# Patient Record
Sex: FEMALE | Race: BLACK OR AFRICAN AMERICAN | Employment: OTHER | URBAN - METROPOLITAN AREA
[De-identification: names, ages, dates, MRNs, and addresses within clinical notes are randomized per-mention and may not be internally consistent; named-entity substitution may affect disease eponyms.]

---

## 2020-07-09 ENCOUNTER — HOSPITAL ENCOUNTER (OUTPATIENT)
Dept: CT IMAGING | Age: 75
Discharge: HOME | End: 2020-07-09
Attending: ORTHOPAEDIC SURGERY
Payer: MEDICARE

## 2020-07-09 DIAGNOSIS — M25.561 RIGHT KNEE PAIN: ICD-10-CM

## 2020-07-09 PROCEDURE — 73700 CT LOWER EXTREMITY W/O DYE: CPT

## 2020-08-05 ENCOUNTER — HOSPITAL ENCOUNTER (OUTPATIENT)
Dept: PREADMISSION TESTING | Age: 75
Discharge: HOME | End: 2020-08-05
Payer: MEDICARE

## 2020-08-05 LAB
ALBUMIN SERPL-MCNC: 3.4 G/DL (ref 3.4–5)
ALBUMIN/GLOB SERPL: 1 {RATIO} (ref 0.8–1.7)
ALP SERPL-CCNC: 72 U/L (ref 45–117)
ALT SERPL-CCNC: 14 U/L (ref 13–56)
ANION GAP SERPL CALC-SCNC: 4 MMOL/L (ref 3–18)
APPEARANCE UR: CLEAR
AST SERPL-CCNC: 14 U/L (ref 10–38)
BACTERIA URNS QL MICRO: ABNORMAL /HPF
BILIRUB SERPL-MCNC: 0.8 MG/DL (ref 0.2–1)
BILIRUB UR QL: NEGATIVE
BUN SERPL-MCNC: 14 MG/DL (ref 7–18)
BUN/CREAT SERPL: 14 (ref 12–20)
CALCIUM SERPL-MCNC: 9.2 MG/DL (ref 8.5–10.1)
CHLORIDE SERPL-SCNC: 105 MMOL/L (ref 100–111)
CO2 SERPL-SCNC: 33 MMOL/L (ref 21–32)
COLOR UR: YELLOW
CREAT SERPL-MCNC: 0.99 MG/DL (ref 0.6–1.3)
EPITH CASTS URNS QL MICRO: ABNORMAL /LPF (ref 0–5)
ERYTHROCYTE [DISTWIDTH] IN BLOOD BY AUTOMATED COUNT: 14.7 % (ref 11.6–14.5)
EST. AVERAGE GLUCOSE BLD GHB EST-MCNC: 140 MG/DL
GLOBULIN SER CALC-MCNC: 3.4 G/DL (ref 2–4)
GLUCOSE SERPL-MCNC: 109 MG/DL (ref 74–99)
GLUCOSE UR STRIP.AUTO-MCNC: NEGATIVE MG/DL
HBA1C MFR BLD: 6.5 % (ref 4.2–5.6)
HCT VFR BLD AUTO: 38.1 % (ref 35–45)
HGB BLD-MCNC: 12 G/DL (ref 12–16)
HGB UR QL STRIP: NEGATIVE
KETONES UR QL STRIP.AUTO: NEGATIVE MG/DL
LEUKOCYTE ESTERASE UR QL STRIP.AUTO: ABNORMAL
MCH RBC QN AUTO: 30.4 PG (ref 24–34)
MCHC RBC AUTO-ENTMCNC: 31.5 G/DL (ref 31–37)
MCV RBC AUTO: 96.5 FL (ref 74–97)
MUCOUS THREADS URNS QL MICRO: POSITIVE /LPF
NITRITE UR QL STRIP.AUTO: NEGATIVE
PH UR STRIP: 6 [PH] (ref 5–8)
PLATELET # BLD AUTO: 232 K/UL (ref 135–420)
PMV BLD AUTO: 11.1 FL (ref 9.2–11.8)
POTASSIUM SERPL-SCNC: 4.1 MMOL/L (ref 3.5–5.5)
PROT SERPL-MCNC: 6.8 G/DL (ref 6.4–8.2)
PROT UR STRIP-MCNC: NEGATIVE MG/DL
RBC # BLD AUTO: 3.95 M/UL (ref 4.2–5.3)
RBC #/AREA URNS HPF: ABNORMAL /HPF (ref 0–5)
SODIUM SERPL-SCNC: 142 MMOL/L (ref 136–145)
SP GR UR REFRACTOMETRY: 1.02 (ref 1–1.03)
UROBILINOGEN UR QL STRIP.AUTO: 1 EU/DL (ref 0.2–1)
WBC # BLD AUTO: 5.4 K/UL (ref 4.6–13.2)
WBC URNS QL MICRO: ABNORMAL /HPF (ref 0–5)

## 2020-08-05 PROCEDURE — 80053 COMPREHEN METABOLIC PANEL: CPT

## 2020-08-05 PROCEDURE — 93005 ELECTROCARDIOGRAM TRACING: CPT

## 2020-08-05 PROCEDURE — 87086 URINE CULTURE/COLONY COUNT: CPT

## 2020-08-05 PROCEDURE — 85027 COMPLETE CBC AUTOMATED: CPT

## 2020-08-05 PROCEDURE — 36415 COLL VENOUS BLD VENIPUNCTURE: CPT

## 2020-08-05 PROCEDURE — 83036 HEMOGLOBIN GLYCOSYLATED A1C: CPT

## 2020-08-05 PROCEDURE — 81001 URINALYSIS AUTO W/SCOPE: CPT

## 2020-08-06 LAB
ATRIAL RATE: 82 BPM
BACTERIA SPEC CULT: NORMAL
CALCULATED P AXIS, ECG09: 62 DEGREES
CALCULATED R AXIS, ECG10: 54 DEGREES
CALCULATED T AXIS, ECG11: 32 DEGREES
DIAGNOSIS, 93000: NORMAL
P-R INTERVAL, ECG05: 138 MS
Q-T INTERVAL, ECG07: 364 MS
QRS DURATION, ECG06: 90 MS
QTC CALCULATION (BEZET), ECG08: 425 MS
SERVICE CMNT-IMP: NORMAL
SERVICE CMNT-IMP: NORMAL
VENTRICULAR RATE, ECG03: 82 BPM

## 2020-08-17 ENCOUNTER — TELEPHONE (OUTPATIENT)
Dept: OTHER | Age: 75
End: 2020-08-17

## 2020-08-17 NOTE — TELEPHONE ENCOUNTER
Spoke with Estrella Starkey about their total knee replacement. Educated patient about getting ready for surgery, what to expect after surgery during hospital stay, and how to get ready now for discharge. Discussion included:  1) Importance of good nutrition before and after surgery. 2) Preventing nausea by eating before taking pain medications. 3) Getting Medical Equipment before coming to the hospital.  4) Reading the education book before surgery. 5) Wearing comfortable clothes that are easy to put on and take off.  6) Buying a stool softener and taking one everyday after surgery to prevent constipation. 7) Getting home ready for after surgery. 8) Home health after surgery. 9) Drinking lots of fluids to make sure urine is light yellow. 10) Moving after surgery. 11) What to expect the day of surgery. Informed patient that increased swelling, bruising and pain are normal at home after knee replacement. Instructed Estrella Starkey it is safe to elevate whole leg on pillows above heart level to help decrease swelling after surgery. Instructed not to put anything directly under the knee so that they are working on straightening the leg when resting. Patient was given the opportunity to ask questions. [facility] Navigators phone number given to patient for any questions that they need answered before or after surgery.      Orthopaedic Navigator

## 2020-08-18 ENCOUNTER — HOSPITAL ENCOUNTER (OUTPATIENT)
Dept: PREADMISSION TESTING | Age: 75
Discharge: HOME | End: 2020-08-18
Payer: MEDICARE

## 2020-08-18 PROCEDURE — 87635 SARS-COV-2 COVID-19 AMP PRB: CPT

## 2020-08-19 LAB — SARS-COV-2, COV2NT: NOT DETECTED

## 2020-08-23 ENCOUNTER — ANESTHESIA EVENT (OUTPATIENT)
Dept: SURGERY | Age: 75
End: 2020-08-23
Payer: MEDICARE

## 2020-08-24 ENCOUNTER — ANESTHESIA (OUTPATIENT)
Dept: SURGERY | Age: 75
End: 2020-08-24
Payer: MEDICARE

## 2020-08-24 ENCOUNTER — HOSPITAL ENCOUNTER (OUTPATIENT)
Age: 75
Discharge: HOME HEALTH | End: 2020-08-25
Attending: ORTHOPAEDIC SURGERY | Admitting: ORTHOPAEDIC SURGERY
Payer: MEDICARE

## 2020-08-24 DIAGNOSIS — M17.0 PRIMARY OSTEOARTHRITIS OF BOTH KNEES: Primary | ICD-10-CM

## 2020-08-24 PROBLEM — M17.9 DJD (DEGENERATIVE JOINT DISEASE) OF KNEE: Status: ACTIVE | Noted: 2020-08-24

## 2020-08-24 LAB
ABO + RH BLD: NORMAL
BLOOD GROUP ANTIBODIES SERPL: NORMAL
GLUCOSE BLD STRIP.AUTO-MCNC: 111 MG/DL (ref 70–110)
GLUCOSE BLD STRIP.AUTO-MCNC: 111 MG/DL (ref 70–110)
GLUCOSE BLD STRIP.AUTO-MCNC: 116 MG/DL (ref 70–110)
GLUCOSE BLD STRIP.AUTO-MCNC: 151 MG/DL (ref 70–110)
SPECIMEN EXP DATE BLD: NORMAL

## 2020-08-24 PROCEDURE — 74011250636 HC RX REV CODE- 250/636: Performed by: ANESTHESIOLOGY

## 2020-08-24 PROCEDURE — C1713 ANCHOR/SCREW BN/BN,TIS/BN: HCPCS | Performed by: ORTHOPAEDIC SURGERY

## 2020-08-24 PROCEDURE — 82962 GLUCOSE BLOOD TEST: CPT

## 2020-08-24 PROCEDURE — 77030011628: Performed by: ORTHOPAEDIC SURGERY

## 2020-08-24 PROCEDURE — 77030016060 HC NDL NRV BLK TELE -A: Performed by: NURSE ANESTHETIST, CERTIFIED REGISTERED

## 2020-08-24 PROCEDURE — 76010000149 HC OR TIME 1 TO 1.5 HR: Performed by: ORTHOPAEDIC SURGERY

## 2020-08-24 PROCEDURE — 74011250637 HC RX REV CODE- 250/637: Performed by: ANESTHESIOLOGY

## 2020-08-24 PROCEDURE — 86900 BLOOD TYPING SEROLOGIC ABO: CPT

## 2020-08-24 PROCEDURE — 74011000250 HC RX REV CODE- 250: Performed by: NURSE ANESTHETIST, CERTIFIED REGISTERED

## 2020-08-24 PROCEDURE — 77030020782 HC GWN BAIR PAWS FLX 3M -B: Performed by: ORTHOPAEDIC SURGERY

## 2020-08-24 PROCEDURE — 97116 GAIT TRAINING THERAPY: CPT

## 2020-08-24 PROCEDURE — 99218 HC RM OBSERVATION: CPT

## 2020-08-24 PROCEDURE — 77030013708 HC HNDPC SUC IRR PULS STRY –B: Performed by: ORTHOPAEDIC SURGERY

## 2020-08-24 PROCEDURE — C1776 JOINT DEVICE (IMPLANTABLE): HCPCS | Performed by: ORTHOPAEDIC SURGERY

## 2020-08-24 PROCEDURE — 74011250636 HC RX REV CODE- 250/636: Performed by: ORTHOPAEDIC SURGERY

## 2020-08-24 PROCEDURE — 74011000258 HC RX REV CODE- 258: Performed by: ORTHOPAEDIC SURGERY

## 2020-08-24 PROCEDURE — 77030020813 HC INST SCULP CEM KT DISP S&N -B: Performed by: ORTHOPAEDIC SURGERY

## 2020-08-24 PROCEDURE — 77030003028 HC SUT VCRL J&J -A: Performed by: ORTHOPAEDIC SURGERY

## 2020-08-24 PROCEDURE — 77030016661 HC BUR RND1 STRY -C: Performed by: ORTHOPAEDIC SURGERY

## 2020-08-24 PROCEDURE — 77030040361 HC SLV COMPR DVT MDII -B: Performed by: ORTHOPAEDIC SURGERY

## 2020-08-24 PROCEDURE — 77030027138 HC INCENT SPIROMETER -A

## 2020-08-24 PROCEDURE — 77030022295 HC SEAL BPLR VSL DISP MEDT -F: Performed by: ORTHOPAEDIC SURGERY

## 2020-08-24 PROCEDURE — 76210000006 HC OR PH I REC 0.5 TO 1 HR: Performed by: ORTHOPAEDIC SURGERY

## 2020-08-24 PROCEDURE — 74011250637 HC RX REV CODE- 250/637: Performed by: ORTHOPAEDIC SURGERY

## 2020-08-24 PROCEDURE — 77030002966 HC SUT PDS J&J -A: Performed by: ORTHOPAEDIC SURGERY

## 2020-08-24 PROCEDURE — 77030006812 HC BLD SAW RECIP STRY -B: Performed by: ORTHOPAEDIC SURGERY

## 2020-08-24 PROCEDURE — 64447 NJX AA&/STRD FEMORAL NRV IMG: CPT | Performed by: NURSE ANESTHETIST, CERTIFIED REGISTERED

## 2020-08-24 PROCEDURE — 77030000032 HC CUF TRNQT ZIMM -B: Performed by: ORTHOPAEDIC SURGERY

## 2020-08-24 PROCEDURE — 76060000033 HC ANESTHESIA 1 TO 1.5 HR: Performed by: ORTHOPAEDIC SURGERY

## 2020-08-24 PROCEDURE — 74011000250 HC RX REV CODE- 250: Performed by: ORTHOPAEDIC SURGERY

## 2020-08-24 PROCEDURE — 97161 PT EVAL LOW COMPLEX 20 MIN: CPT

## 2020-08-24 PROCEDURE — 36415 COLL VENOUS BLD VENIPUNCTURE: CPT

## 2020-08-24 PROCEDURE — 77030037714 HC CLOSR DEV INCIS ZIP STRY -C: Performed by: ORTHOPAEDIC SURGERY

## 2020-08-24 PROCEDURE — 74011250636 HC RX REV CODE- 250/636: Performed by: NURSE ANESTHETIST, CERTIFIED REGISTERED

## 2020-08-24 PROCEDURE — 77030012508 HC MSK AIRWY LMA AMBU -A: Performed by: ANESTHESIOLOGY

## 2020-08-24 PROCEDURE — 76942 ECHO GUIDE FOR BIOPSY: CPT | Performed by: ORTHOPAEDIC SURGERY

## 2020-08-24 PROCEDURE — 77030038010: Performed by: ORTHOPAEDIC SURGERY

## 2020-08-24 DEVICE — CEMENT BNE 20GM HALF DOSE PMMA VISC RADPQ FAST: Type: IMPLANTABLE DEVICE | Site: KNEE | Status: FUNCTIONAL

## 2020-08-24 DEVICE — IMPLANTABLE DEVICE: Type: IMPLANTABLE DEVICE | Site: KNEE | Status: FUNCTIONAL

## 2020-08-24 RX ORDER — FLUMAZENIL 0.1 MG/ML
0.2 INJECTION INTRAVENOUS
Status: DISCONTINUED | OUTPATIENT
Start: 2020-08-24 | End: 2020-08-24 | Stop reason: HOSPADM

## 2020-08-24 RX ORDER — AMOXICILLIN 250 MG
1 CAPSULE ORAL 2 TIMES DAILY
Status: DISCONTINUED | OUTPATIENT
Start: 2020-08-24 | End: 2020-08-25 | Stop reason: HOSPADM

## 2020-08-24 RX ORDER — SODIUM CHLORIDE, SODIUM LACTATE, POTASSIUM CHLORIDE, CALCIUM CHLORIDE 600; 310; 30; 20 MG/100ML; MG/100ML; MG/100ML; MG/100ML
1000 INJECTION, SOLUTION INTRAVENOUS CONTINUOUS
Status: DISCONTINUED | OUTPATIENT
Start: 2020-08-24 | End: 2020-08-24 | Stop reason: HOSPADM

## 2020-08-24 RX ORDER — FENTANYL CITRATE 50 UG/ML
INJECTION, SOLUTION INTRAMUSCULAR; INTRAVENOUS AS NEEDED
Status: DISCONTINUED | OUTPATIENT
Start: 2020-08-24 | End: 2020-08-24 | Stop reason: HOSPADM

## 2020-08-24 RX ORDER — SODIUM CHLORIDE 0.9 % (FLUSH) 0.9 %
5-40 SYRINGE (ML) INJECTION AS NEEDED
Status: DISCONTINUED | OUTPATIENT
Start: 2020-08-24 | End: 2020-08-25 | Stop reason: HOSPADM

## 2020-08-24 RX ORDER — PANTOPRAZOLE SODIUM 40 MG/1
40 TABLET, DELAYED RELEASE ORAL DAILY
Status: CANCELLED | OUTPATIENT
Start: 2020-08-24

## 2020-08-24 RX ORDER — HYDROMORPHONE HYDROCHLORIDE 1 MG/ML
1 INJECTION, SOLUTION INTRAMUSCULAR; INTRAVENOUS; SUBCUTANEOUS
Status: DISCONTINUED | OUTPATIENT
Start: 2020-08-24 | End: 2020-08-25 | Stop reason: HOSPADM

## 2020-08-24 RX ORDER — FACIAL-BODY WIPES
10 EACH TOPICAL DAILY PRN
Status: DISCONTINUED | OUTPATIENT
Start: 2020-08-24 | End: 2020-08-25 | Stop reason: HOSPADM

## 2020-08-24 RX ORDER — ONDANSETRON 2 MG/ML
INJECTION INTRAMUSCULAR; INTRAVENOUS AS NEEDED
Status: DISCONTINUED | OUTPATIENT
Start: 2020-08-24 | End: 2020-08-24 | Stop reason: HOSPADM

## 2020-08-24 RX ORDER — INSULIN LISPRO 100 [IU]/ML
INJECTION, SOLUTION INTRAVENOUS; SUBCUTANEOUS ONCE
Status: DISCONTINUED | OUTPATIENT
Start: 2020-08-24 | End: 2020-08-24 | Stop reason: HOSPADM

## 2020-08-24 RX ORDER — KETAMINE HYDROCHLORIDE 10 MG/ML
INJECTION, SOLUTION INTRAMUSCULAR; INTRAVENOUS AS NEEDED
Status: DISCONTINUED | OUTPATIENT
Start: 2020-08-24 | End: 2020-08-24 | Stop reason: HOSPADM

## 2020-08-24 RX ORDER — SODIUM CHLORIDE 0.9 % (FLUSH) 0.9 %
5-40 SYRINGE (ML) INJECTION EVERY 8 HOURS
Status: DISCONTINUED | OUTPATIENT
Start: 2020-08-24 | End: 2020-08-25 | Stop reason: HOSPADM

## 2020-08-24 RX ORDER — SODIUM CHLORIDE 0.9 % (FLUSH) 0.9 %
5-40 SYRINGE (ML) INJECTION EVERY 8 HOURS
Status: DISCONTINUED | OUTPATIENT
Start: 2020-08-24 | End: 2020-08-24 | Stop reason: HOSPADM

## 2020-08-24 RX ORDER — CELECOXIB 100 MG/1
200 CAPSULE ORAL 2 TIMES DAILY
Status: DISCONTINUED | OUTPATIENT
Start: 2020-08-24 | End: 2020-08-25 | Stop reason: HOSPADM

## 2020-08-24 RX ORDER — NALOXONE HYDROCHLORIDE 0.4 MG/ML
0.1 INJECTION, SOLUTION INTRAMUSCULAR; INTRAVENOUS; SUBCUTANEOUS AS NEEDED
Status: DISCONTINUED | OUTPATIENT
Start: 2020-08-24 | End: 2020-08-24 | Stop reason: HOSPADM

## 2020-08-24 RX ORDER — INSULIN LISPRO 100 [IU]/ML
INJECTION, SOLUTION INTRAVENOUS; SUBCUTANEOUS
Status: DISCONTINUED | OUTPATIENT
Start: 2020-08-24 | End: 2020-08-25 | Stop reason: HOSPADM

## 2020-08-24 RX ORDER — GLYCOPYRROLATE 0.2 MG/ML
INJECTION INTRAMUSCULAR; INTRAVENOUS AS NEEDED
Status: DISCONTINUED | OUTPATIENT
Start: 2020-08-24 | End: 2020-08-24 | Stop reason: HOSPADM

## 2020-08-24 RX ORDER — HYDROMORPHONE HYDROCHLORIDE 2 MG/ML
0.5 INJECTION, SOLUTION INTRAMUSCULAR; INTRAVENOUS; SUBCUTANEOUS
Status: DISCONTINUED | OUTPATIENT
Start: 2020-08-24 | End: 2020-08-24 | Stop reason: HOSPADM

## 2020-08-24 RX ORDER — ACETAMINOPHEN 325 MG/1
650 TABLET ORAL EVERY 6 HOURS
Status: DISCONTINUED | OUTPATIENT
Start: 2020-08-24 | End: 2020-08-25 | Stop reason: HOSPADM

## 2020-08-24 RX ORDER — ROPIVACAINE HYDROCHLORIDE 5 MG/ML
INJECTION, SOLUTION EPIDURAL; INFILTRATION; PERINEURAL
Status: COMPLETED | OUTPATIENT
Start: 2020-08-24 | End: 2020-08-24

## 2020-08-24 RX ORDER — ENOXAPARIN SODIUM 100 MG/ML
40 INJECTION SUBCUTANEOUS DAILY
Status: DISCONTINUED | OUTPATIENT
Start: 2020-08-25 | End: 2020-08-25 | Stop reason: HOSPADM

## 2020-08-24 RX ORDER — CEFAZOLIN SODIUM 2 G/50ML
2 SOLUTION INTRAVENOUS EVERY 8 HOURS
Status: COMPLETED | OUTPATIENT
Start: 2020-08-24 | End: 2020-08-25

## 2020-08-24 RX ORDER — OXYCODONE HYDROCHLORIDE 5 MG/1
10 TABLET ORAL
Status: DISCONTINUED | OUTPATIENT
Start: 2020-08-24 | End: 2020-08-25 | Stop reason: HOSPADM

## 2020-08-24 RX ORDER — DEXTROSE, SODIUM CHLORIDE, SODIUM LACTATE, POTASSIUM CHLORIDE, AND CALCIUM CHLORIDE 5; .6; .31; .03; .02 G/100ML; G/100ML; G/100ML; G/100ML; G/100ML
50 INJECTION, SOLUTION INTRAVENOUS CONTINUOUS
Status: DISCONTINUED | OUTPATIENT
Start: 2020-08-24 | End: 2020-08-25 | Stop reason: HOSPADM

## 2020-08-24 RX ORDER — SODIUM CHLORIDE, SODIUM LACTATE, POTASSIUM CHLORIDE, CALCIUM CHLORIDE 600; 310; 30; 20 MG/100ML; MG/100ML; MG/100ML; MG/100ML
125 INJECTION, SOLUTION INTRAVENOUS CONTINUOUS
Status: DISCONTINUED | OUTPATIENT
Start: 2020-08-24 | End: 2020-08-25 | Stop reason: HOSPADM

## 2020-08-24 RX ORDER — MIDAZOLAM HYDROCHLORIDE 1 MG/ML
INJECTION, SOLUTION INTRAMUSCULAR; INTRAVENOUS
Status: COMPLETED | OUTPATIENT
Start: 2020-08-24 | End: 2020-08-24

## 2020-08-24 RX ORDER — DEXTROSE MONOHYDRATE 100 MG/ML
125-250 INJECTION, SOLUTION INTRAVENOUS AS NEEDED
Status: DISCONTINUED | OUTPATIENT
Start: 2020-08-24 | End: 2020-08-24 | Stop reason: HOSPADM

## 2020-08-24 RX ORDER — DIPHENHYDRAMINE HYDROCHLORIDE 50 MG/ML
12.5 INJECTION, SOLUTION INTRAMUSCULAR; INTRAVENOUS
Status: DISCONTINUED | OUTPATIENT
Start: 2020-08-24 | End: 2020-08-25 | Stop reason: HOSPADM

## 2020-08-24 RX ORDER — PROPOFOL 10 MG/ML
INJECTION, EMULSION INTRAVENOUS AS NEEDED
Status: DISCONTINUED | OUTPATIENT
Start: 2020-08-24 | End: 2020-08-24 | Stop reason: HOSPADM

## 2020-08-24 RX ORDER — ZOLPIDEM TARTRATE 5 MG/1
5 TABLET ORAL
Status: DISCONTINUED | OUTPATIENT
Start: 2020-08-24 | End: 2020-08-25 | Stop reason: HOSPADM

## 2020-08-24 RX ORDER — ONDANSETRON 2 MG/ML
4 INJECTION INTRAMUSCULAR; INTRAVENOUS
Status: DISCONTINUED | OUTPATIENT
Start: 2020-08-24 | End: 2020-08-25 | Stop reason: HOSPADM

## 2020-08-24 RX ORDER — FENTANYL CITRATE 50 UG/ML
25 INJECTION, SOLUTION INTRAMUSCULAR; INTRAVENOUS AS NEEDED
Status: DISCONTINUED | OUTPATIENT
Start: 2020-08-24 | End: 2020-08-24 | Stop reason: HOSPADM

## 2020-08-24 RX ORDER — LIDOCAINE HYDROCHLORIDE 20 MG/ML
INJECTION, SOLUTION EPIDURAL; INFILTRATION; INTRACAUDAL; PERINEURAL AS NEEDED
Status: DISCONTINUED | OUTPATIENT
Start: 2020-08-24 | End: 2020-08-24 | Stop reason: HOSPADM

## 2020-08-24 RX ORDER — ACETAMINOPHEN 500 MG
1000 TABLET ORAL ONCE
Status: COMPLETED | OUTPATIENT
Start: 2020-08-24 | End: 2020-08-24

## 2020-08-24 RX ORDER — SODIUM CHLORIDE 0.9 % (FLUSH) 0.9 %
5-40 SYRINGE (ML) INJECTION AS NEEDED
Status: DISCONTINUED | OUTPATIENT
Start: 2020-08-24 | End: 2020-08-24 | Stop reason: HOSPADM

## 2020-08-24 RX ORDER — MAGNESIUM SULFATE 100 %
4 CRYSTALS MISCELLANEOUS AS NEEDED
Status: DISCONTINUED | OUTPATIENT
Start: 2020-08-24 | End: 2020-08-24 | Stop reason: HOSPADM

## 2020-08-24 RX ORDER — TRAMADOL HYDROCHLORIDE 50 MG/1
50 TABLET ORAL 4 TIMES DAILY
Status: DISCONTINUED | OUTPATIENT
Start: 2020-08-24 | End: 2020-08-25 | Stop reason: HOSPADM

## 2020-08-24 RX ORDER — MIDAZOLAM HYDROCHLORIDE 1 MG/ML
INJECTION, SOLUTION INTRAMUSCULAR; INTRAVENOUS
Status: DISPENSED
Start: 2020-08-24 | End: 2020-08-24

## 2020-08-24 RX ORDER — CEFAZOLIN SODIUM 2 G/50ML
2 SOLUTION INTRAVENOUS ONCE
Status: COMPLETED | OUTPATIENT
Start: 2020-08-24 | End: 2020-08-24

## 2020-08-24 RX ORDER — MIDAZOLAM HYDROCHLORIDE 1 MG/ML
INJECTION, SOLUTION INTRAMUSCULAR; INTRAVENOUS AS NEEDED
Status: DISCONTINUED | OUTPATIENT
Start: 2020-08-24 | End: 2020-08-24 | Stop reason: HOSPADM

## 2020-08-24 RX ADMIN — PROPOFOL 150 MG: 10 INJECTION, EMULSION INTRAVENOUS at 11:16

## 2020-08-24 RX ADMIN — HYDROMORPHONE HYDROCHLORIDE 0.5 MG: 2 INJECTION, SOLUTION INTRAMUSCULAR; INTRAVENOUS; SUBCUTANEOUS at 12:39

## 2020-08-24 RX ADMIN — MIDAZOLAM 2 MG: 1 INJECTION INTRAMUSCULAR; INTRAVENOUS at 11:06

## 2020-08-24 RX ADMIN — FENTANYL CITRATE 25 MCG: 50 INJECTION, SOLUTION INTRAMUSCULAR; INTRAVENOUS at 11:21

## 2020-08-24 RX ADMIN — PHENYLEPHRINE HYDROCHLORIDE 100 MCG: 10 INJECTION INTRAVENOUS at 11:38

## 2020-08-24 RX ADMIN — TRAMADOL HYDROCHLORIDE 50 MG: 50 TABLET, FILM COATED ORAL at 21:31

## 2020-08-24 RX ADMIN — OXYCODONE 10 MG: 5 TABLET ORAL at 19:55

## 2020-08-24 RX ADMIN — FENTANYL CITRATE 50 MCG: 50 INJECTION, SOLUTION INTRAMUSCULAR; INTRAVENOUS at 12:31

## 2020-08-24 RX ADMIN — ONDANSETRON HYDROCHLORIDE 4 MG: 2 INJECTION INTRAMUSCULAR; INTRAVENOUS at 11:09

## 2020-08-24 RX ADMIN — CEFAZOLIN SODIUM 2 G: 2 SOLUTION INTRAVENOUS at 11:06

## 2020-08-24 RX ADMIN — KETAMINE HYDROCHLORIDE 20 MG: 10 INJECTION, SOLUTION INTRAMUSCULAR; INTRAVENOUS at 11:21

## 2020-08-24 RX ADMIN — CEFAZOLIN SODIUM 2 G: 2 SOLUTION INTRAVENOUS at 19:14

## 2020-08-24 RX ADMIN — ACETAMINOPHEN 650 MG: 325 TABLET ORAL at 15:14

## 2020-08-24 RX ADMIN — MIDAZOLAM 2 MG: 1 INJECTION INTRAMUSCULAR; INTRAVENOUS at 10:00

## 2020-08-24 RX ADMIN — SODIUM CHLORIDE, SODIUM LACTATE, POTASSIUM CHLORIDE, AND CALCIUM CHLORIDE: 600; 310; 30; 20 INJECTION, SOLUTION INTRAVENOUS at 11:25

## 2020-08-24 RX ADMIN — SODIUM CHLORIDE, SODIUM LACTATE, POTASSIUM CHLORIDE, AND CALCIUM CHLORIDE 125 ML/HR: 600; 310; 30; 20 INJECTION, SOLUTION INTRAVENOUS at 08:55

## 2020-08-24 RX ADMIN — GLYCOPYRROLATE 0.2 MG: 0.2 INJECTION INTRAMUSCULAR; INTRAVENOUS at 11:09

## 2020-08-24 RX ADMIN — ROPIVACAINE HYDROCHLORIDE 25 ML: 5 INJECTION, SOLUTION EPIDURAL; INFILTRATION; PERINEURAL at 10:00

## 2020-08-24 RX ADMIN — Medication 1 TABLET: at 21:31

## 2020-08-24 RX ADMIN — ACETAMINOPHEN 650 MG: 325 TABLET ORAL at 21:31

## 2020-08-24 RX ADMIN — CELECOXIB 200 MG: 100 CAPSULE ORAL at 21:31

## 2020-08-24 RX ADMIN — PHENYLEPHRINE HYDROCHLORIDE 100 MCG: 10 INJECTION INTRAVENOUS at 11:56

## 2020-08-24 RX ADMIN — ACETAMINOPHEN 1000 MG: 500 TABLET ORAL at 09:32

## 2020-08-24 RX ADMIN — FENTANYL CITRATE 25 MCG: 50 INJECTION, SOLUTION INTRAMUSCULAR; INTRAVENOUS at 11:30

## 2020-08-24 RX ADMIN — HYDROMORPHONE HYDROCHLORIDE 0.5 MG: 2 INJECTION, SOLUTION INTRAMUSCULAR; INTRAVENOUS; SUBCUTANEOUS at 12:49

## 2020-08-24 RX ADMIN — LIDOCAINE HYDROCHLORIDE 60 MG: 20 INJECTION, SOLUTION EPIDURAL; INFILTRATION; INTRACAUDAL; PERINEURAL at 11:16

## 2020-08-24 NOTE — ANESTHESIA PROCEDURE NOTES
Peripheral Block    Start time: 2020 10:00 AM  End time: 2020 10:04 AM  Performed by: Lissy Bullock MD  Authorized by: Lissy Bullock MD       Pre-procedure: Indications: at surgeon's request and post-op pain management    Preanesthetic Checklist: patient identified, risks and benefits discussed, site marked, timeout performed, anesthesia consent given and patient being monitored    Timeout Time: 10:00          Block Type:   Block Type:  Femoral single shot  Laterality:  Right  Monitoring:  Standard ASA monitoring, continuous pulse ox, frequent vital sign checks, heart rate, responsive to questions and oxygen  Injection Technique:  Single shot  Procedures: ultrasound guided    Patient Position: supine  Prep: chlorhexidine    Location:  Mid thigh  Needle Type:  Stimuplex  Needle Gauge:  21 G  Needle Localization:  Anatomical landmarks and ultrasound guidance    Assessment:  Number of attempts:  1  Injection Assessment:  Incremental injection every 5 mL, local visualized surrounding nerve on ultrasound, negative aspiration for blood, no paresthesia, no intravascular symptoms and ultrasound image on chart  Patient tolerance:  Patient tolerated the procedure well with no immediate complications    Muna Toledo   = [id number]  CSN = [id number]    Femoral nerve identified by ultrasound  just proximal to adductor canal.        Local anesthetic injected without resistance and with gentle aspiration every 3-5 ml with direct visualization with ultrasound     Patient tolerated procedure well, vital signs stable throughout, with no apparent complications. Entire procedure completed prior to start of anesthesia time.      Muna Toledo   = [id number]  CSN = [id number]

## 2020-08-24 NOTE — ROUTINE PROCESS
Bedside and Verbal shift change report given to [id number] [address] by Aleshia Echols RN.  Report included the following information SBAR, Kardex, OR Summary, Intake/Output and MAR

## 2020-08-24 NOTE — ANESTHESIA POSTPROCEDURE EVALUATION
Procedure(s):  RIGHT UNI KNEE REPLACEMENT-MEDIAL. general, regional    Anesthesia Post Evaluation        Comments: Post-Anesthesia Evaluation and Assessment    Cardiovascular Function/Vital Signs  /76   Pulse 79   Temp 36.3 °C (97.4 °F)   Resp 11   Ht 5' 8\" (1.727 m)   SpO2 100%   BMI 27.52 kg/m²     Patient is status post Procedure(s):  RIGHT UNI KNEE REPLACEMENT-MEDIAL. Nausea/Vomiting: Controlled. Postoperative hydration reviewed and adequate. Pain:  Pain Scale 1: FLACC (08/24/20 1258)  Pain Intensity 1: 0 (08/24/20 1258)   Managed. Neurological Status:   Neuro (WDL): Within Defined Limits (08/24/20 0842)   At baseline. Mental Status and Level of Consciousness: Arousable. Pulmonary Status:   O2 Device: Nasal cannula (08/24/20 1258)   Adequate oxygenation and airway patent. Complications related to anesthesia: None    Post-anesthesia assessment completed. No concerns. Patient has met all discharge requirements. Signed By: Khalif Gonsalez MD    August 24, 2020                   INITIAL Post-op Vital signs:   Vitals Value Taken Time   /76 8/24/2020  1:01 PM   Temp 36.3 °C (97.4 °F) 8/24/2020 12:29 PM   Pulse 75 8/24/2020  1:05 PM   Resp 9 8/24/2020  1:05 PM   SpO2 100 % 8/24/2020  1:05 PM   Vitals shown include unvalidated device data.

## 2020-08-24 NOTE — ANESTHESIA PREPROCEDURE EVALUATION
Relevant Problems   No relevant active problems       Anesthetic History   No history of anesthetic complications            Review of Systems / Medical History  Patient summary reviewed, nursing notes reviewed and pertinent labs reviewed    Pulmonary  Within defined limits                 Neuro/Psych   Within defined limits           Cardiovascular    Hypertension              Exercise tolerance: >4 METS     GI/Hepatic/Renal     GERD           Endo/Other    Diabetes    Arthritis     Other Findings              Physical Exam    Airway  Mallampati: II  TM Distance: 4 - 6 cm  Neck ROM: normal range of motion   Mouth opening: Normal     Cardiovascular               Dental  No notable dental hx       Pulmonary                 Abdominal  GI exam deferred       Other Findings            Anesthetic Plan    ASA: 2  Anesthesia type: general and regional - femoral single shot      Post-op pain plan if not by surgeon: peripheral nerve block single    Induction: Intravenous  Anesthetic plan and risks discussed with: Patient      Risk of a block include nerve injury, bleeding, infection, and failure as the most common ones although they rare.

## 2020-08-24 NOTE — PERIOP NOTES
Patient assigned to room 208    Family updated on patient current status and room assignment at this time.

## 2020-08-24 NOTE — OP NOTES
[facility]  OPERATIVE REPORT    Name:  Marycarmen Ross  MR#:   [id number]  :  1945  ACCOUNT #:  [de-identified]  DATE OF SERVICE:  2020    PREOPERATIVE DIAGNOSIS:  Right knee medial compartment degenerative arthritis. POSTOPERATIVE DIAGNOSIS:  Right knee medial compartment degenerative arthritis. PROCEDURE PERFORMED:  Right medial unicondylar replacement. SURGEON:  Nidhi Baptiste MD    ASSISTANT:  None. ANESTHESIOLOGIST:  Juan Carlos Mendoza MD    ANESTHESIA:  General with supplemental adductor canal block. COMPLICATIONS:  None. SPECIMENS REMOVED:  None. IMPLANTS:  Conformis right knee medial unicondylar replacement. ESTIMATED BLOOD LOSS:  Minimal.    INDICATIONS:  A 79-year-old female, goes to surgery for medial uni knee replacement. PROCEDURE:  The patient was brought to the operating room. After receiving antibiotics, general anesthesia was administered. Tourniquet was placed on the right thigh. Right lower extremity was prepped and draped sterilely. After exsanguination, tourniquet was inflated to 350 mmHg. An incision was made from the superior pole to the tibial tubercle based slightly medially. Skin flaps were developed. A medial parapatellar arthrotomy was performed. At this point, the capsule was stripped off of the proximal tibia medially to the level of the medial collateral ligament. At this point, the knee was flexed to about 90 degrees, and the patella was retracted laterally, exposing the medial compartment. A patient-specific femoral guide was then placed over the distal femur and marked with a Bovie. A ring curette was used to remove the articular cartilage on the distal femur medially and also from the proximal tibia on the medial side. At this point, the patient-specific distal femoral guide was pinned into position. Drill holes were placed, and the posterior femoral cut was made.   With the knee placed at full extension, a spacer block guide was placed into the medial compartment. This was patient specific, and a proximal tibia cutting block was pinned to this. A sagittal and horizontal cut was made, removing a small wafer of the proximal tibia. With the knee flexed to 90 degrees, the remnants of the medial meniscus were removed. A curved osteotome was used to remove any osteophytes that were present posteriorly. A venecia was then used to remove any remaining bone from the femoral side until the trial femoral implant fit flushly, and then additional drill holes were placed into the distal femur to allow for cement penetration. With the knee flexed to 90 degrees, a patient-specific tibial guide was then used to place drill holes in the proximal tibia, and a keel punch was used to make a slot into the proximal tibia for the tibial component. The posterior capsule was Bovied, and long-acting local was injected into the knee. The actual tibial component was then used as a trial and placed onto the proximal tibia. The patient-specific femoral trial was then placed over the distal femur, and a trial bearing was then placed into position and trialled until the appropriate size bearing was selected. The knee was placed through a limited flexion and extension. All the trial components were then removed, and bony surfaces were irrigated. Quick-setting cement was prepared on the back table. The tibial component was then cemented, followed by the femoral component, and then the actual poly was snapped into position. Excess cement was removed, and the knee was put at full extension to allow the cement to cure. The arthrotomy incision was closed with PDS suture, the subcu with Vicryl, and the skin was closed with a Prineo closure system, followed by placement of a stocking to the affected extremity.   The tourniquet was released with normal filling, and the patient remained neurovascularly intact and went to Recovery in stable condition.       MD MANNY [id number]  D:  08/24/2020 12:17  T:  08/24/2020 14:25  JOB #:  [id number]

## 2020-08-24 NOTE — PROGRESS NOTES
1358- Patient arrives to unit at this time. Dual skin assessment completed with Angelica Romero RN, no abnormalities noted besides surgical incision to right knee, fall risk armband in place. Admission assessment completed. Patient is A/O x 4, but drowsy. Lungs clear, radial pulses present , pedal pulses present , abdomen soft and non-distended. Bowel sounds active, 18 G IV to RFA  intact and patent infusing. No signs of phlebitis or infiltration noted. FIFI hose to LLE and plexis applied bilaterally. Skin warm and dry  with  ACE dressing to RLE CDI. Patient denies any new numbness/tingling. Pain 0/10. Patient was oriented to the room to include use of call bell, meal ordering, and use of incentive spirometer, patient able to get IS to 1500. Patient was given explanation of \" up for dinner\" program and has verbalized understanding. Phone and call bell left within reach. Plan of care for the day addressed with patient. Educated on pain medication availability and possible side effects as well as need to call for assistance before getting out of bed, patient verbalized understanding.

## 2020-08-24 NOTE — PROGRESS NOTES
Problem: Mobility Impaired (Adult and Pediatric)  Goal: *Acute Goals and Plan of Care (Insert Text)  Description: Goals to be addressed in 1-4 days:  STG  1. Rolling L to R to L modified independent for positioning. 2. Supine to sit to supine S with HR for meals. 3. Sit to stand to sit S with RW in prep for ambulation. LTG  1. Ambulate >150ft SBA with RW, WBAT, for home/community mobility. 2. Ascend/descend a >3 stair steps CGA with HR for home entry. Note:   PHYSICAL THERAPY EVALUATION    Patient: Chaparro Pinto (89 y.o. female)  Date: 8/24/2020  Primary Diagnosis: DJD (degenerative joint disease) of knee [M17.10]  Procedure(s) (LRB):  RIGHT UNI KNEE REPLACEMENT-MEDIAL (Right) Day of Surgery   Precautions:   Fall, WBAT    ASSESSMENT :  Based on the objective data described below, the patient presents with lower extremity weakness, decreased gait quality and endurance, impaired bed mobility and transfers, decreased R knee ROM/flexibility, and overall limitations in functional mobility s/p R UKR. Pt performed supine to sit with CGA, sit to stand with CGA. Patient ambulated 30 feet with RW, GB applied, CGA. Pt tolerated session well as evidenced by no lightheadedness or dizziness. Patient would benefit from skilled inpatient physical therapy to address deficits, progress as tolerated to achieve long term goals and allow safe discharge. Patient will benefit from skilled intervention to address the above impairments.   Patients rehabilitation potential is considered to be Good  Factors which may influence rehabilitation potential include:   []         None noted  []         Mental ability/status  [x]         Medical condition  []         Home/family situation and support systems  []         Safety awareness  []         Pain tolerance/management  []         Other:      PLAN :  Recommendations and Planned Interventions:  [x]           Bed Mobility Training             []    Neuromuscular Re-Education  [x] Transfer Training                   []    Orthotic/Prosthetic Training  [x]           Gait Training                          []    Modalities  [x]           Therapeutic Exercises          []    Edema Management/Control  [x]           Therapeutic Activities            [x]    Patient and Family Training/Education  []           Other (comment):    Frequency/Duration: Patient will be followed by physical therapy twice daily to address goals. Discharge Recommendations: Home Health  Further Equipment Recommendations for Discharge: N/A     SUBJECTIVE:   Patient stated I am doing good.     OBJECTIVE DATA SUMMARY:     Past Medical History:   Diagnosis Date    Arthritis     Chronic pain     knee    Diabetes ([facility] [address]     no medication exercise and diet control    GERD (gastroesophageal reflux disease)     Hiatal hernia     Hypertension      Past Surgical History:   Procedure Laterality Date    HX  SECTION      x 3    HX OTHER SURGICAL      removal of fatty tumor back x 2     HX TONSILLECTOMY       Barriers to Learning/Limitations: None  Compensate with: Visual Cues, Verbal Cues, and Tactile Cues  Prior Level of Function/Home Situation: Independent amb s/AD  Home Situation  Home Environment: Private residence  # Steps to Enter: 4  Rails to Enter: Yes  Hand Rails : Bilateral  One/Two Story Residence: Two story  Living Alone: Yes  Support Systems: Friends \ neighbors  Patient Expects to be Discharged to[de-identified] Private residence  Current DME Used/Available at Home: Walker, rolling  Critical Behavior:  Psychosocial  Purposeful Interaction: Yes  Pt Identified Daily Priority: Clinical issues (comment)  Caritas Process: Nurture loving kindness  Caring Interventions: Reassure; Therapeutic modalities  Reassure: Therapeutic listening; Informing  Therapeutic Modalities: Deep breathing  Skin Integrity: Incision (comment)(right knee)  Skin Integumentary  Skin Integrity: Incision (comment)(right knee)   Strength:    Strength: Generally decreased, functional  Tone & Sensation:   Tone: Normal  Sensation: Impaired  Range Of Motion:  AROM: Generally decreased, functional  PROM: Generally decreased, functional  Functional Mobility:  Bed Mobility:   Supine to Sit: Contact guard assistance  Sit to Supine: Contact guard assistance   Transfers:  Sit to Stand: Contact guard assistance  Stand to Sit: Contact guard assistance  Balance:   Sitting: Intact  Standing: Intact; With support  Ambulation/Gait Training:  Distance (ft): 30 Feet (ft)  Assistive Device: Gait belt;Walker, rolling  Ambulation - Level of Assistance: Contact guard assistance   Gait Description (WDL): Exceptions to WDL  Gait Abnormalities: Antalgic;Decreased step clearance  Right Side Weight Bearing: As tolerated   Base of Support: Shift to left  Stance: Right decreased  Speed/Mariaelena: Slow  Step Length: Right shortened;Left shortened  Pain:  Pain Scale 1: Numeric (0 - 10)  Pain Intensity 1: 0  Pain Intervention(s) 1: Medication (see MAR)  Activity Tolerance:   Good  Please refer to the flowsheet for vital signs taken during this treatment. After treatment:   []         Patient left in no apparent distress sitting up in chair  [x]         Patient left in no apparent distress in bed  [x]         Call bell left within reach  [x]         Nursing notified  []         Caregiver present  []         Bed alarm activated    COMMUNICATION/EDUCATION:   [x]         Fall prevention education was provided and the patient/caregiver indicated understanding. [x]         Patient/family have participated as able in goal setting and plan of care. [x]         Patient/family agree to work toward stated goals and plan of care. []         Patient understands intent and goals of therapy, but is neutral about his/her participation. []         Patient is unable to participate in goal setting and plan of care.     Thank you for this referral.  Keon Castillo   Time Calculation: 31 mins   Eval Complexity: History: MEDIUM  Complexity : 1-2 comorbidities / personal factors will impact the outcome/ POC Exam:LOW Complexity : 1-2 Standardized tests and measures addressing body structure, function, activity limitation and / or participation in recreation  Presentation: LOW Complexity : Stable, uncomplicated  Clinical Decision Making:Low Complexity    Overall Complexity:LOW

## 2020-08-24 NOTE — PERIOP NOTES
TRANSFER - OUT REPORT:    Verbal report given to Mikhail Kohler RN(name) on [facility]  being transferred to (unit) for routine post - op       Report consisted of patients Situation, Background, Assessment and   Recommendations(SBAR). Information from the following report(s) SBAR, Kardex, OR Summary, Procedure Summary, Intake/Output and MAR was reviewed with the receiving nurse. Lines:   Peripheral IV 08/24/20 Right; Inner Forearm (Active)   Site Assessment Clean, dry, & intact 08/24/20 1300   Phlebitis Assessment 0 08/24/20 1300   Infiltration Assessment 0 08/24/20 1300   Dressing Status Clean, dry, & intact 08/24/20 1300   Dressing Type Transparent;Tape 08/24/20 1300   Hub Color/Line Status Green; Infusing 08/24/20 1300   Alcohol Cap Used No 08/24/20 0859        Opportunity for questions and clarification was provided.       Patient transported with:   Registered Nurse  Tech

## 2020-08-24 NOTE — PERIOP NOTES
Vital signs stable throughout nerve block. Patient resting with eyes closed. Call bell in reach. Will continue to monitor.

## 2020-08-24 NOTE — PROGRESS NOTES
1934 assessment completed: patient is alert and oriented on room air, lungs are clear,patient was able to use incentive [id number],capillary refill less than 3 secs , skin is warm to the touch, peripheral pulses are present X 4.dressing to the right knee is clean dry and intact has ACE RAP on it, IV, 18g to the right inner arm is clean dry and intact. No redness, no edema or coolness. Dorsiflexion and planter flexion are strong and equal bilateral, hand  are strong equal bilateral. No numbness and tingling ,no calf pain. Patient has FIFI's on left leg to the thigh. Sensation present in both legs. patient has plexis bilateral. Patient was given ice. Patients blood sugar was 151 and refused insulin, patient states\" I have never used insulin, I only used diet and exercise to control my insulin. \" patient was given fresh water, call light, phone and personal items are within reach. Bed is at the lowest position. 0032: Shift Assessment completed. Patient walked to the bathroom, CHG bath done. Gown, draw sheet changed. No numbness and tingling Fresh ice placed on knee, fresh cup of water given. Bed is at the lowest position,call light, phone and personal items are within reach. 0303: Reassessment completed no changes noted see nursing flow sheet. Patient walked to the bathroom , call light, phone and personal items were within reach. 4222 Bedside and Verbal shift change report given to Stephani Rodarte RN (oncoming nurse) by JEREMIAH Cervantes (offgoing nurse). Report included the following information SBAR, Kardex and MAR.

## 2020-08-24 NOTE — H&P
History and Physical        Patient: Leann Mercedes               Sex: female          DOA: 2020         YOB: 1945      Age:  69 y.o.        LOS:  LOS: 0 days        HPI:     Leann Mercedes is a 69 y.o. female who has right knee medial DJD has failed non-op therapy fpr med UKR    Past Medical History:   Diagnosis Date    Arthritis     Chronic pain     knee    Diabetes ([address]     no medication exercise and diet control    GERD (gastroesophageal reflux disease)     Hiatal hernia     Hypertension        Past Surgical History:   Procedure Laterality Date    HX  SECTION      x 3    HX OTHER SURGICAL      removal of fatty tumor back x 2     HX TONSILLECTOMY         History reviewed. No pertinent family history. Social History     Socioeconomic History    Marital status:      Spouse name: Not on file    Number of children: Not on file    Years of education: Not on file    Highest education level: Not on file   Tobacco Use    Smoking status: Never Smoker    Smokeless tobacco: Never Used   Substance and Sexual Activity    Alcohol use: Yes     Comment: 1 per month    Drug use: Never    Sexual activity: Not Currently       Prior to Admission medications    Medication Sig Start Date End Date Taking? Authorizing Provider   pantoprazole (PROTONIX) 40 mg tablet Take 40 mg by mouth daily. Indications: gastroesophageal reflux disease   Yes Provider, Historical   valsartan-hydroCHLOROthiazide (DIOVAN-HCT) 80-12.5 mg per tablet Take 1 Tab by mouth daily. Indications: high blood pressure   Yes Provider, Historical   celecoxib (CeleBREX) 200 mg capsule Take 200 mg by mouth as needed for Pain. Provider, Historical   cholecalciferol (Vitamin D3) 25 mcg (1,000 unit) cap Take 5,000 Units by mouth daily. Provider, Historical   magnesium 250 mg tab Take  by mouth five (5) days a week.     Provider, Historical       Allergies   Allergen Reactions    Codeine Nausea and Vomiting Review of Systems  Pertinent items are noted in the History of Present Illness. Physical Exam:      Visit Vitals  /85 (BP 1 Location: Left arm, BP Patient Position: At rest)   Pulse 76   Temp 96.9 °F (36.1 °C)   Resp 18   Ht 5' 8\" (1.727 m)   SpO2 100%   BMI 27.52 kg/m²       Physical Exam:  Physical Exam:   General:  Alert, cooperative, no distress, appears stated age. Eyes:  Conjunctivae/corneas clear. PERRL, EOMs intact. Fundi benign   Ears:  Normal TMs and external ear canals both ears. Nose: Nares normal. Septum midline. Mucosa normal. No drainage or sinus tenderness. Mouth/Throat: Lips, mucosa, and tongue normal. Teeth and gums normal.   Neck: Supple, symmetrical, trachea midline, no adenopathy, thyroid: no enlargement/tenderness/nodules, no carotid bruit and no JVD. Back:   Symmetric, no curvature. ROM normal. No CVA tenderness. Lungs:   Clear to auscultation bilaterally. Heart:  Regular rate and rhythm, S1, S2 normal, no murmur, click, rub or gallop. Abdomen:   Soft, non-tender. Bowel sounds normal. No masses,  No organomegaly. Extremities: Extremities normal, atraumatic, no cyanosis or edema. Right knee pain medail   Pulses: 2+ and symmetric all extremities. Skin: Skin color, texture, turgor normal. No rashes or lesions   Lymph nodes: Cervical, supraclavicular, and axillary nodes normal.   Neurologic: CNII-XII intact. Normal strength, sensation and reflexes throughout. Labs Reviewed: All lab results for the last 24 hours reviewed.     Assessment/Plan     Active Problems:    DJD (degenerative joint disease) of knee (8/24/2020)        Right knee med UKR

## 2020-08-25 VITALS
HEIGHT: 68 IN | HEART RATE: 75 BPM | BODY MASS INDEX: 27.52 KG/M2 | TEMPERATURE: 98.1 F | SYSTOLIC BLOOD PRESSURE: 103 MMHG | RESPIRATION RATE: 16 BRPM | OXYGEN SATURATION: 95 % | DIASTOLIC BLOOD PRESSURE: 78 MMHG

## 2020-08-25 PROBLEM — M17.9 DJD (DEGENERATIVE JOINT DISEASE) OF KNEE: Status: RESOLVED | Noted: 2020-08-24 | Resolved: 2020-08-25

## 2020-08-25 LAB
ANION GAP SERPL CALC-SCNC: 1 MMOL/L (ref 3–18)
BUN SERPL-MCNC: 13 MG/DL (ref 7–18)
BUN/CREAT SERPL: 14 (ref 12–20)
CALCIUM SERPL-MCNC: 8.6 MG/DL (ref 8.5–10.1)
CHLORIDE SERPL-SCNC: 108 MMOL/L (ref 100–111)
CO2 SERPL-SCNC: 33 MMOL/L (ref 21–32)
CREAT SERPL-MCNC: 0.9 MG/DL (ref 0.6–1.3)
ERYTHROCYTE [DISTWIDTH] IN BLOOD BY AUTOMATED COUNT: 15.1 % (ref 11.6–14.5)
GLUCOSE BLD STRIP.AUTO-MCNC: 122 MG/DL (ref 70–110)
GLUCOSE SERPL-MCNC: 128 MG/DL (ref 74–99)
HCT VFR BLD AUTO: 30.2 % (ref 35–45)
HGB BLD-MCNC: 9.6 G/DL (ref 12–16)
MCH RBC QN AUTO: 30.5 PG (ref 24–34)
MCHC RBC AUTO-ENTMCNC: 31.8 G/DL (ref 31–37)
MCV RBC AUTO: 95.9 FL (ref 74–97)
PLATELET # BLD AUTO: 195 K/UL (ref 135–420)
PMV BLD AUTO: 10.7 FL (ref 9.2–11.8)
POTASSIUM SERPL-SCNC: 4.3 MMOL/L (ref 3.5–5.5)
RBC # BLD AUTO: 3.15 M/UL (ref 4.2–5.3)
SODIUM SERPL-SCNC: 142 MMOL/L (ref 136–145)
WBC # BLD AUTO: 6.5 K/UL (ref 4.6–13.2)

## 2020-08-25 PROCEDURE — 97166 OT EVAL MOD COMPLEX 45 MIN: CPT

## 2020-08-25 PROCEDURE — 82962 GLUCOSE BLOOD TEST: CPT

## 2020-08-25 PROCEDURE — 97530 THERAPEUTIC ACTIVITIES: CPT

## 2020-08-25 PROCEDURE — 97116 GAIT TRAINING THERAPY: CPT

## 2020-08-25 PROCEDURE — 74011250636 HC RX REV CODE- 250/636: Performed by: ORTHOPAEDIC SURGERY

## 2020-08-25 PROCEDURE — 80048 BASIC METABOLIC PNL TOTAL CA: CPT

## 2020-08-25 PROCEDURE — 74011250637 HC RX REV CODE- 250/637: Performed by: ORTHOPAEDIC SURGERY

## 2020-08-25 PROCEDURE — 85027 COMPLETE CBC AUTOMATED: CPT

## 2020-08-25 PROCEDURE — 97535 SELF CARE MNGMENT TRAINING: CPT

## 2020-08-25 PROCEDURE — 36415 COLL VENOUS BLD VENIPUNCTURE: CPT

## 2020-08-25 RX ORDER — TRAMADOL HYDROCHLORIDE 50 MG/1
50 TABLET ORAL 4 TIMES DAILY
Qty: 56 TAB | Refills: 1 | Status: SHIPPED | OUTPATIENT
Start: 2020-08-25 | End: 2020-09-08

## 2020-08-25 RX ORDER — OXYCODONE HYDROCHLORIDE 10 MG/1
10 TABLET ORAL
Qty: 20 TAB | Refills: 0 | Status: SHIPPED | OUTPATIENT
Start: 2020-08-25 | End: 2020-09-24

## 2020-08-25 RX ORDER — GUAIFENESIN 100 MG/5ML
162 LIQUID (ML) ORAL DAILY
Qty: 60 TAB | Refills: 0 | Status: SHIPPED | OUTPATIENT
Start: 2020-08-25 | End: 2021-01-11

## 2020-08-25 RX ORDER — ACETAMINOPHEN 325 MG/1
650 TABLET ORAL EVERY 6 HOURS
Qty: 120 TAB | Refills: 1 | Status: SHIPPED | OUTPATIENT
Start: 2020-08-25

## 2020-08-25 RX ADMIN — TRAMADOL HYDROCHLORIDE 50 MG: 50 TABLET, FILM COATED ORAL at 10:13

## 2020-08-25 RX ADMIN — ACETAMINOPHEN 650 MG: 325 TABLET ORAL at 04:03

## 2020-08-25 RX ADMIN — SODIUM CHLORIDE, SODIUM LACTATE, POTASSIUM CHLORIDE, AND CALCIUM CHLORIDE 125 ML/HR: 600; 310; 30; 20 INJECTION, SOLUTION INTRAVENOUS at 03:40

## 2020-08-25 RX ADMIN — Medication 1 TABLET: at 10:13

## 2020-08-25 RX ADMIN — OXYCODONE 10 MG: 5 TABLET ORAL at 02:24

## 2020-08-25 RX ADMIN — CELECOXIB 200 MG: 100 CAPSULE ORAL at 10:13

## 2020-08-25 RX ADMIN — ACETAMINOPHEN 650 MG: 325 TABLET ORAL at 10:13

## 2020-08-25 RX ADMIN — ENOXAPARIN SODIUM 40 MG: 40 INJECTION SUBCUTANEOUS at 09:05

## 2020-08-25 RX ADMIN — CEFAZOLIN SODIUM 2 G: 2 SOLUTION INTRAVENOUS at 02:24

## 2020-08-25 RX ADMIN — ONDANSETRON 4 MG: 2 INJECTION INTRAMUSCULAR; INTRAVENOUS at 09:01

## 2020-08-25 NOTE — PROGRESS NOTES
Problem: Diabetes Self-Management  Goal: *Disease process and treatment process  Description: Define diabetes and identify own type of diabetes; list 3 options for treating diabetes. Outcome: Progressing Towards Goal     Problem: Falls - Risk of  Goal: *Absence of Falls  Description: Document Neto Brice Fall Risk and appropriate interventions in the flowsheet. Outcome: Progressing Towards Goal  Note: Fall Risk Interventions:  Mobility Interventions: Patient to call before getting OOB, Communicate number of staff needed for ambulation/transfer         Medication Interventions: Patient to call before getting OOB    Elimination Interventions: Call light in reach, Patient to call for help with toileting needs    History of Falls Interventions:  Investigate reason for fall         Problem: Pain  Goal: *Control of Pain  Outcome: Progressing Towards Goal     Problem: Knee Replacement: Day of Surgery/Unit  Goal: Respiratory  Outcome: Progressing Towards Goal

## 2020-08-25 NOTE — PROGRESS NOTES
Problem: Mobility Impaired (Adult and Pediatric)  Goal: *Acute Goals and Plan of Care (Insert Text)  Description: Goals to be addressed in 1-4 days:  STG  1. Rolling L to R to L modified independent for positioning. 2. Supine to sit to supine S with HR for meals. 3. Sit to stand to sit S with RW in prep for ambulation. LTG  1. Ambulate >150ft SBA with RW, WBAT, for home/community mobility. 2. Ascend/descend a >3 stair steps CGA with HR for home entry. Outcome: Resolved/Met  Note:   PHYSICAL THERAPY TREATMENT    Patient: Sheryle Fix (72 y.o. female)  Date: 8/25/2020  Diagnosis: DJD (degenerative joint disease) of knee [M17.10]   <principal problem not specified>  Procedure(s) (LRB):  RIGHT UNI KNEE REPLACEMENT-MEDIAL (Right) 1 Day Post-Op  Precautions: Fall, WBAT  Chart, physical therapy assessment, plan of care and goals were reviewed. ASSESSMENT:  Pt rating pain in R knee 3/10 on numerical pain scale pre, during and post session. Pt able to participate in transfer training requiring S for supine<>sit<>stand. Pt able to participate in gt training w/ RW, WBAT, GB and SBA/S w/ antalgic pattern. Pt able to participate in stair training w/ B rail use, step to pattern and CGA. Pt is ready to transition to HHPT. Pt left supine in bed w/ all needs within reach. Nurse Felipe Quinn aware. Ice packs to R knee applied. Progression toward goals:  [x]      Improving appropriately and has met current goals. If pt remains in hospital will need revision of PT goals. []      Improving slowly and progressing toward goals  []      Not making progress toward goals and plan of care will be adjusted     PLAN:  Patient continues to benefit from skilled intervention to address the above impairments. Continue treatment per established plan of care.   Revise goals for continued progression if pt remains in hospital.  Discharge Recommendations:  Home Health  Further Equipment Recommendations for Discharge:  N/A     SUBJECTIVE: Patient stated I am doing pretty good and pain remains low.     OBJECTIVE DATA SUMMARY:   Critical Behavior:  Neurologic State: Alert, Appropriate for age  Orientation Level: Oriented X4  Cognition: Appropriate decision making, Appropriate for age attention/concentration, Appropriate safety awareness, Follows commands     Functional Mobility Training:  Bed Mobility:  Supine to Sit: Supervision  Sit to Supine: Supervision  Scooting: Supervision  Transfers:  Sit to Stand: Supervision(vc)  Stand to Sit: Supervision(vc)  Balance:  Sitting: Intact  Standing: Intact; With support   Range Of Motion:   Ambulation/Gait Training:  Distance (ft): 160 Feet (ft)  Assistive Device: Walker, rolling;Gait belt  Ambulation - Level of Assistance: Stand-by assistance;Supervision  Gait Abnormalities: Antalgic;Decreased step clearance; Step to gait  Right Side Weight Bearing: As tolerated  Base of Support: Shift to left  Stance: Right decreased  Speed/Mariaelena: Slow  Step Length: Left shortened;Right shortened  Interventions: Safety awareness training; Tactile cues; Verbal cues; Visual/Demos  Number of Stairs Trained: 10  Stairs - Level of Assistance: Contact guard assistance;Stand-by assistance(vc)  Rail Use: Both  Neuro Re-Education:  Therapeutic Exercises:   Encouraged HEP  Pain:  Pain Scale 1: Numeric (0 - 10)  Pain Intensity 1: 3  Pain Location 1: Knee  Pain Orientation 1: Right  Pain Description 1: Aching  Pain Intervention(s) 1: Ice  Activity Tolerance:   Fair   Please refer to the flowsheet for vital signs taken during this treatment.   After treatment:   [] Patient left in no apparent distress sitting up in chair  [x] Patient left in no apparent distress in bed  [x] Call bell left within reach  [x] Nursing notified  [] Caregiver present  [] Bed alarm activated      Shreyas Low PT   Time Calculation: 40 mins

## 2020-08-25 NOTE — DISCHARGE SUMMARY
Discharge Summary    Patient: Jose               Sex: female          DOA: 8/24/2020         YOB: 1945      Age:  69 y.o.        LOS:  LOS: 0 days                Admit Date: 8/24/2020    Discharge Date: 8/25/2020    Admission Diagnoses: DJD (degenerative joint disease) of knee [M17.10]    Discharge Diagnoses:    Problem List as of 8/25/2020 Date Reviewed: 8/24/2020          Codes Class Noted - Resolved    RESOLVED: DJD (degenerative joint disease) of knee ICD-10-CM: M17.10  ICD-9-CM: 715.36  8/24/2020 - 8/25/2020              Discharge Condition: Good    Hospital Course: [facility]    Consults: None    Significant Diagnostic Studies: none    Discharge Medications:     Current Discharge Medication List      START taking these medications    Details   acetaminophen (TYLENOL) 325 mg tablet Take 2 Tabs by mouth every six (6) hours. Qty: 120 Tab, Refills: 1      oxyCODONE IR (ROXICODONE) 10 mg tab immediate release tablet Take 1 Tab by mouth every six (6) hours as needed for Pain for up to 30 days. Max Daily Amount: 40 mg.  Qty: 20 Tab, Refills: 0    Associated Diagnoses: Primary osteoarthritis of both knees      traMADoL (ULTRAM) 50 mg tablet Take 1 Tab by mouth four (4) times daily for 14 days. Max Daily Amount: 200 mg. Qty: 56 Tab, Refills: 1    Associated Diagnoses: Primary osteoarthritis of both knees      aspirin 81 mg chewable tablet Take 2 Tabs by mouth daily. Qty: 60 Tab, Refills: 0         CONTINUE these medications which have NOT CHANGED    Details   pantoprazole (PROTONIX) 40 mg tablet Take 40 mg by mouth daily. Indications: gastroesophageal reflux disease      valsartan-hydroCHLOROthiazide (DIOVAN-HCT) 80-12.5 mg per tablet Take 1 Tab by mouth daily. Indications: high blood pressure      celecoxib (CeleBREX) 200 mg capsule Take 200 mg by mouth as needed for Pain. cholecalciferol (Vitamin D3) 25 mcg (1,000 unit) cap Take 5,000 Units by mouth daily.       magnesium 250 mg tab Take  by mouth five (5) days a week.              Activity: Activity as tolerated    Diet: Regular Diet    Wound Care: Keep wound clean and dry    Follow-up: 2 weeks, [facility] for PT

## 2020-08-25 NOTE — PROGRESS NOTES
Transition of Care (MANAN) Plan:     Chart reviewed cm spoke with pt at bedside, cm introduced self and explained cm role as d/c planner, pt prearranged with [facility], [facility]OC offered pt selected [facility] services, pt has home rolling walker, cm will remain available if needed. MANAN Transportation:   How is patient being transported at discharge? Family/Friend      When? Once cleared by Therapy between 12-2pm     Is transport scheduled? N/A      Follow-up appointment and transportation:   PCP/Specialist?  See AVS for Appointment         Who is transporting to the follow-up appointment? Family/Friend      Is transport for follow up appointment scheduled? N/A    Communication plan (with patient/family): Who is being called? Patient or Next of Kin? Responsible party? Patient      What number(s) is to be used? See Facesheet      What service provider is calling for [facility] services? When are they calling? 24-48 hours following discharge    Readmission Risk? (Green/Low; Yellow/Moderate; Red/High):  Green. Care Management Interventions  PCP Verified by CM: Yes  Palliative Care Criteria Met (RRAT>21 & CHF Dx)?: No  Mode of Transport at Discharge: Other (see comment)(family)  Transition of Care Consult (CM Consult): [address]: No  Reason Outside [place]: Patient already serviced by other home care/hospice agency([facility])  Physical Therapy Consult: Yes  Occupational Therapy Consult: Yes  Current Support Network:  Other(friends/family)  Confirm Follow Up Transport: Self  The Patient and/or Patient Representative was Provided with a Choice of Provider and Agrees with the Discharge Plan?: Yes  Freedom of Choice List was Provided with Basic Dialogue that Supports the Patient's Individualized Plan of Care/Goals, Treatment Preferences and Shares the Quality Data Associated with the Providers?: Yes  Discharge Location  Discharge Placement: Home with home health

## 2020-08-25 NOTE — PROGRESS NOTES
OCCUPATIONAL THERAPY EVALUATION/DISCHARGE    Patient: Ricco Betancourt (40 y.o. female)  Date: 8/25/2020  Primary Diagnosis: DJD (degenerative joint disease) of knee [M17.10]  Procedure(s) (LRB):  RIGHT UNI KNEE REPLACEMENT-MEDIAL (Right) 1 Day Post-Op   Precautions: R UKA,  Fall, WBAT  PLOF: Patient was independent w/o AD at baseline; lives alone    ASSESSMENT AND RECOMMENDATIONS:  Based on the objective data described below, the patient presents with RLE decreased ROM and strength affecting LE ADLs. Patient able to utilize LLE ankle-over-knee technique and bending forward with RLE for sock donning. Pt will require assist w/ compression hose which patient reports home health can assist.  Instructed on compensatory strategies for compression hose donning utilizing plastic bag over foot, including removal once donned; and how to roll/scrunch compression hose for increased ease & hand joint protection when doffing. Pt able to complete toileting w/o assist. During transition from toilet to sink, pt w/ LOB w/ CGA to recover. Pt placing hand on wall. Pt reports she might have moved too fast. Encouraged pt to be careful during transitions and both hands on RW. Also encouraged pt to have cell phone on her to call for assistance/emergency help. Education: Reviewed home safety, body mechanics, importance of moving every hour to prevent joint stiffness, role of ice for edema/pain control, Rolling Walker management/safety, and adaptive dressing techniques with patient verbalizing  understanding at this time     Skilled Occupational Therapy is not indicated at this time in this setting. Patient should continue to improve as pain and ROM/strength improves. Discharge Recommendations: Home Health  Further Equipment Recommendations for Discharge: To Be Determined (TBD) at next level of care      SUBJECTIVE:   Patient stated I think I moved too fast. I'm fine.     OBJECTIVE DATA SUMMARY:     Past Medical History:   Diagnosis Date Arthritis     Chronic pain     knee    Diabetes (HCC)     no medication exercise and diet control    GERD (gastroesophageal reflux disease)     Hiatal hernia     Hypertension      Past Surgical History:   Procedure Laterality Date    HX  SECTION      x 3    HX OTHER SURGICAL      removal of fatty tumor back x 2     HX TONSILLECTOMY       Barriers to Learning/Limitations: None  Compensate with: visual, verbal, tactile, kinesthetic cues/model    Home Situation/Prior level of Function:   Home Situation  Home Environment: Private residence  # Steps to Enter: 4  Rails to Enter: Yes  Hand Rails : Bilateral  One/Two Story Residence: Two story  Living Alone: Yes  Support Systems: Friends \ neighbors  Patient Expects to be Discharged to[de-identified] Private residence  Current DME Used/Available at Home: Walker, rolling  Tub or Shower Type: Shower(on 2nd floor; 1/2 bath on first)  [x]  Right hand dominant   []  Left hand dominant    Cognitive/Behavioral Status:  Neurologic State: Alert; Appropriate for age  Orientation Level: Oriented X4  Cognition: Appropriate decision making; Appropriate for age attention/concentration; Appropriate safety awareness; Follows commands  Safety/Judgement: Awareness of environment; Fall prevention    Skin: R knee incision w/ Honeycomb & moderate blood drainage   Edema: compression hose in place & applied ice     Coordination:  Coordination: Within functional limits  Fine Motor Skills-Upper: Left Intact; Right Intact    Gross Motor Skills-Upper: Left Intact; Right Intact    Balance:  Sitting: Intact  Standing: Intact; With support    Strength: BUE  Strength: Generally decreased, functional    Tone & Sensation:BUE  Tone: Normal  Sensation: Intact    Range of Motion:BUE  AROM: Generally decreased, functional  PROM: Generally decreased, functional    Functional Mobility and Transfers for ADLs:  Bed Mobility:  Supine to Sit: Supervision  Sit to Supine: Supervision  Scooting: Supervision  Transfers:  Sit to Stand: Supervision(vc)  Bed to Chair: Supervision;Modified independent; Adaptive equipment   Toilet Transfer : Supervision;Modified independent; Adaptive equipment    ADL Assessment/Intervention:  Feeding: Independent  Oral Facial Hygiene/Grooming: Supervision  Bathing: Setup; Additional time  Upper Body Dressing: Setup  Lower Body Dressing: Setup;Minimum assistance; Additional time  Toileting: Supervision    Feeding  Feeding Assistance: Independent  Container Management: Independent  Cutting Food: Independent  Utensil Management: Independent  Food to Mouth: Independent  Drink to Mouth: Independent  Grooming  Washing Hands: Contact guard assistance     Upper Body Dressing Assistance  Pullover Shirt: Set-up  Lower Body Dressing Assistance  Underpants: Set-up  Pants With Elastic Waist: Set-up  Socks: Set-up  Antiembolitic Stockings: Minimum assistance; Compensatory technique training  Position Performed: Seated edge of bed    LE Adaptive Equipment:  [x] Adaptive Equipment was not issued due to patient able to complete with out use of AE and use of AE would prevent stretching needed to progress with recovery. (Patient able to complete w/ compensatory strategies and able to compensate for socks w/ clothing modifications, but will require assist with Compression hose). Toileting  Bladder Hygiene: Supervision;Modified independent  Clothing Management: Modified independent  Adaptive Equipment: Walker    Cognitive Retraining  Safety/Judgement: Awareness of environment; Fall prevention    Pain:  Pain level pre-treatment: 2/10  Pain level post-treatment: 2/10  Pain Intervention(s): Medication administer by Nursing (see MAR); Rest, Ice, Repositioning   Response to intervention: Nurse notified, see doc flow sheet    Please refer to the flowsheet for vital signs taken during this treatment.   After treatment:   [x]  Patient left in no apparent distress sitting up in chair  []  Patient sitting on EOB  []  Patient left in no apparent distress in bed  [x]  Call bell left within reach  [x]  Nursing notified  []  Caregiver present  [x]  Ice applied  []  SCD's on while back in bed    COMMUNICATION/EDUCATION:   Communication/Collaboration:  [x]       Role of Occupational Therapy in the acute care setting. [x]      Home safety education was provided and the patient/caregiver indicated understanding. [x]      Patient/family have participated as able in goal setting and plan of care. [x]      Patient/family agree to work toward stated goals and plan of care. []      Patient understands intent and goals of therapy, but is neutral about his/her participation. []      Patient is unable to participate in plan of care at this time. Thank you for this referral.  Katherin Slater, OTR/L, CSRS. CDCS, CFPS  Time Calculation: 28 mins    Eval Complexity: History: MEDIUM Complexity : Expanded review of history including physical, cognitive and psychosocial  history ; Examination: MEDIUM Complexity : 3-5 performance deficits relating to physical, cognitive , or psychosocial skils that result in activity limitations and / or participation restrictions; Decision Making:MEDIUM Complexity : Patient may present with comorbidities that affect occupational performnce.  Miniml to moderate modification of tasks or assistance (eg, physical or verbal ) with assesment(s) is necessary to enable patient to complete evaluation

## 2020-08-25 NOTE — PROGRESS NOTES
3609-  - Patient in bed at this time. A/O x 4. Lungs clear, radial pulses present , pedal pulses present , abdomen soft and non-distended. Bowel sounds active, 18 G IV to RFA  Intact, patent and infusing. No signs of phlebitis or infiltration noted. FIFI hose to LLE and plexis applied bilaterally. Skin warm and dry  with  ACE dressing to RLE CDI. Patient denies numbness/tingling. Pain 0/10. Bed placed in lowest position, call bell within reach. 1700- Patient complained of nausea, PRN Zofran 4 mg IV given at this time as ordered for nausea, will continue to monitor for effectiveness.

## 2020-08-25 NOTE — PROGRESS NOTES
Loise Hodgkin Rounded on post uniknee replacement. Patient educated: Activity:   OOB for all meals,   Walk 3-4 times each day to promote circulation, help move better and lessen stiffness. Do not put anything under knee. Promoting circulation  Use SCD pumps except when walking. Ankle pumps 10 times an hour at hospital & home. Take medications as prescribed by provider. Pain Control:  Pain medications side effects of constipation, nausea, dizziness, itching reviewed. Reminded patient swelling, bruising and increased pain are normal at home. To help decrease swelling after surgery it is safe to lie down and elevate legs above heart to help decrease swelling. Use pillows while keeping the surgical knee straight when elevating. Use ice, distraction, moving, & change position to help with pain. Rest between activity. Narcotics and anti-inflammatory medication can cause nausea and decreased appetite so eat a snack before taking medication (even in the middle of the night) here and at home  to prevent nausea. Narcotics cause constipation so take stool softener/mild laxative daily while on narcotics. Incentive Spirometry:    Use of incentive spirometer 10 x/hr. Wound Care: .   Keep dressing and/or dry and intact. No lotions, powders, creams to surgical leg. .    Cover the dressing before bathing to make sure the dressing stays dry. Diet:   Eat for healing. Drink 8 glasses of water a day. Patient Safety:   Call light & belongings in reach. Call for help when want to walk or get OOB. Loise Hodgkin verbalized understand. Given the opportunity for asking questions.       Orthopaedic Navigator

## 2020-08-25 NOTE — DISCHARGE INSTRUCTIONS
Patient Education        Total Knee Replacement: What to Expect at the Hospital  What care can you expect in the hospital?     After knee replacement surgery, you will be taken to the intensive care unit or recovery room. In a few hours, you will go to your hospital room. You may see a metal triangle called a trapeze over your bed. You can use this to help move yourself around in bed. You will be very tired and will want to rest. Your nurse may also help turn you as you rest.  You will probably still have a tube that drains urine from your bladder (urinary catheter). And you will be getting fluids into your vein through an IV tube. You may also have a tube called a drain near the cut (incision) in your knee. You may not feel hungry. You may feel sick to your stomach or constipated for a couple of days. This is common. Your nurse may give you stool softeners or laxatives to help with constipation. You may have stockings that put pressure on your legs to prevent blood clots. Your nurse will also give you medicine and exercise instructions to help prevent clots. Most people get out of bed with help on the day of surgery or the next day. Your doctor will let you know if you will stay in the hospital or if you can go home the day of surgery. This care sheet gives you a general idea about how your recovery will begin in the hospital. Each person has a different experience and recovers at a different pace. What will happen in the hospital?  Pain and pain medicine  · You will receive medicine to help control pain. Some pain medicines are given through an IV. Others are taken by mouth. · Take it as needed, and remember that it is easier to prevent pain before it starts than to stop it after it has started. · If you are still in pain after you take your medicine, tell your nurse. You may need new medicine or to get the medicine in a different form.   Other medicines  · Your doctor will tell you if and when you can restart your medicines. He or she will also give you instructions about taking any new medicines. · If you take aspirin or some other blood thinner, ask your doctor if and when to start taking it again. Make sure that you understand exactly what your doctor wants you to do. · Your doctor will probably give you blood thinners to prevent blood clots in your leg. You take this medicine during your hospital stay and when you go home. Rehabilitation  · Your rehabilitation (rehab) will probably begin the day of your surgery. Your physical therapist will get you started. It may be painful to exercise at first, but your nurse will give you pain medicine if you need it. · Your physical therapist will help you walk, go up and down stairs, and get in and out of bed and chairs. He or she will help improve the movement (range of motion) and strength in your knee. You will learn positions and motions that will help prevent your knee from popping out (dislocating). This is a very important part of your therapy. · How quickly you regain strength and motion and do things on your own depends on how well you follow your physical therapy. Your physical therapist will teach you the exercises, but you must do them yourself. · An occupational therapist will work with you. He or she will teach you how to bathe, dress, and do daily activities. You may need tools to help with everyday activities. Tools include shower stools, shoehorns, and long-handled sponges. Diet  · You will get liquids at first, but you can start to eat your normal diet when you feel like it. If your stomach is upset, your nurse will probably bring you bland, low-fat foods like plain rice, broiled chicken, toast, and yogurt. · You may have more fiber added to your meals to prevent constipation. Incision care  · You will have a bandage over your incision. Your nurse will care for this.   Other instructions  · Your nurse or respiratory therapist will have you do breathing and coughing exercises to prevent problems such as pneumonia. Breathe in deeply through your nose, and slowly breathe out through your mouth. Do this 3 times, and then cough 2 times. · You may have a device (incentive spirometer) that you suck air through to help keep your lungs healthy. Use this as your nurse or therapist tells you to. Follow-up care is a key part of your treatment and safety. Be sure to make and go to all appointments, and call your doctor if you are having problems. It's also a good idea to know your test results and keep a list of the medicines you take. When should you call for help? · You have severe trouble breathing. · You have a cough, shortness of breath, or chest pain. · You are sick to your stomach or cannot keep fluids down. · You have signs of a blood clot, such as:  ? Pain in your calf, back of the knee, thigh, or groin. ? Redness and swelling in your leg or groin. · You are in pain or your pain does not get better after you take pain medicine. · Bright red blood has soaked through the bandage over your incision. · You have signs of infection, such as:  ? Increased pain, swelling, warmth, or redness. ? Red streaks leading from the incision. ? Pus draining from the incision. ? A fever. Where can you learn more? Go to http://www.gray.com/  Enter T750 in the search box to learn more about \"Total Knee Replacement: What to Expect at the Hospital.\"  Current as of: March 2, 2020               Content Version: 12.5  © 2006-2020 [employer]. Care instructions adapted under license by [employer] (which disclaims liability or warranty for this information). If you have questions about a medical condition or this instruction, always ask your healthcare professional. [place] [address] any warranty or liability for your use of this information.          DISCHARGE SUMMARY from Nurse    PATIENT INSTRUCTIONS:    After general anesthesia or intravenous sedation, for 24 hours or while taking prescription Narcotics:  · Limit your activities  · Do not drive and operate hazardous machinery  · Do not make important personal or business decisions  · Do  not drink alcoholic beverages  · If you have not urinated within 8 hours after discharge, please contact your surgeon on call. Report the following to your surgeon:  · Excessive pain, swelling, redness or odor of or around the surgical area  · Temperature over 100.5  · Nausea and vomiting lasting longer than 4 hours or if unable to take medications  · Any signs of decreased circulation or nerve impairment to extremity: change in color, persistent  numbness, tingling, coldness or increase pain  · Any questions    What to do at Home:    *  Please give a list of your current medications to your Primary Care Provider. *  Please update this list whenever your medications are discontinued, doses are      changed, or new medications (including over-the-counter products) are added. *  Please carry medication information at all times in case of emergency situations. These are general instructions for a healthy lifestyle:    No smoking/ No tobacco products/ Avoid exposure to second hand smoke  Surgeon General's Warning:  Quitting smoking now greatly reduces serious risk to your health. Obesity, smoking, and sedentary lifestyle greatly increases your risk for illness    A healthy diet, regular physical exercise & weight monitoring are important for maintaining a healthy lifestyle    You may be retaining fluid if you have a history of heart failure or if you experience any of the following symptoms:  Weight gain of 3 pounds or more overnight or 5 pounds in a week, increased swelling in our hands or feet or shortness of breath while lying flat in bed.   Please call your doctor as soon as you notice any of these symptoms; do not wait until your next office visit.        The discharge information has been reviewed with the patient. The patient verbalized understanding. Discharge medications reviewed with the patient and appropriate educational materials and side effects teaching were provided.   ___________________________________________________________________________________________________________________________________

## 2020-08-26 ENCOUNTER — TELEPHONE (OUTPATIENT)
Dept: OTHER | Age: 75
End: 2020-08-26

## 2020-08-26 ENCOUNTER — TELEPHONE (OUTPATIENT)
Dept: SURGERY | Age: 75
End: 2020-08-26

## 2020-08-26 NOTE — TELEPHONE ENCOUNTER
Marco Maynard post uniknee replacement follow up call. [facility] coming today. Discussed using ice, distraction, and repositioning to manage pain besides using medication. Reminded patient not to get the wound wet and to cover it before bathing. Reminded patient the importance of doing exercises as shown. Reminded patient to change positions frequently and walking at least 3-4 times each day to promote circulation, decrease stiffness and soreness. Reinforced increased swelling, bruising and pain are normal after surgery when at home. Educated to lie down and raise leg while straight on pillows above heart level to help decrease swelling too. Reminded to healthy eat foods along with drinking plenty of fluids to promote healing. Reminded not  to take medication on an empty stomach to prevent nausea. Reminded to take a stool softener while taking a narcotic due to constipation being a side effect of anesthesia and narcotics. Taking medications as prescribed by provider. Marco Maynard knows when their follow up appointment is.       Orthopaedic Navigator

## 2020-11-18 ENCOUNTER — HOSPITAL ENCOUNTER (OUTPATIENT)
Dept: LAB | Age: 75
Discharge: HOME | End: 2020-11-18
Payer: MEDICARE

## 2020-11-18 LAB
BASOPHILS # BLD: 0 K/UL (ref 0–0.1)
BASOPHILS NFR BLD: 0 % (ref 0–2)
CRP SERPL-MCNC: <0.3 MG/DL (ref 0–0.3)
DIFFERENTIAL METHOD BLD: ABNORMAL
EOSINOPHIL # BLD: 0 K/UL (ref 0–0.4)
EOSINOPHIL NFR BLD: 1 % (ref 0–5)
ERYTHROCYTE [DISTWIDTH] IN BLOOD BY AUTOMATED COUNT: 14.1 % (ref 11.6–14.5)
ERYTHROCYTE [SEDIMENTATION RATE] IN BLOOD: 23 MM/HR (ref 0–30)
HCT VFR BLD AUTO: 35.3 % (ref 35–45)
HGB BLD-MCNC: 11.5 G/DL (ref 12–16)
LYMPHOCYTES # BLD: 1.4 K/UL (ref 0.9–3.6)
LYMPHOCYTES NFR BLD: 31 % (ref 21–52)
MCH RBC QN AUTO: 30.5 PG (ref 24–34)
MCHC RBC AUTO-ENTMCNC: 32.6 G/DL (ref 31–37)
MCV RBC AUTO: 93.6 FL (ref 74–97)
MONOCYTES # BLD: 0.3 K/UL (ref 0.05–1.2)
MONOCYTES NFR BLD: 6 % (ref 3–10)
NEUTS SEG # BLD: 2.9 K/UL (ref 1.8–8)
NEUTS SEG NFR BLD: 62 % (ref 40–73)
PLATELET # BLD AUTO: 252 K/UL (ref 135–420)
PMV BLD AUTO: 10.2 FL (ref 9.2–11.8)
RBC # BLD AUTO: 3.77 M/UL (ref 4.2–5.3)
WBC # BLD AUTO: 4.7 K/UL (ref 4.6–13.2)

## 2020-11-18 PROCEDURE — 85652 RBC SED RATE AUTOMATED: CPT

## 2020-11-18 PROCEDURE — 36415 COLL VENOUS BLD VENIPUNCTURE: CPT

## 2020-11-18 PROCEDURE — 86140 C-REACTIVE PROTEIN: CPT

## 2020-11-18 PROCEDURE — 85025 COMPLETE CBC W/AUTO DIFF WBC: CPT

## 2020-12-10 ENCOUNTER — TRANSCRIBE ORDER (OUTPATIENT)
Dept: REGISTRATION | Age: 75
End: 2020-12-10

## 2020-12-10 ENCOUNTER — HOSPITAL ENCOUNTER (OUTPATIENT)
Dept: PREADMISSION TESTING | Age: 75
Discharge: HOME | End: 2020-12-10
Payer: MEDICARE

## 2020-12-10 DIAGNOSIS — M17.11 OSTEOARTHRITIS OF RIGHT KNEE: ICD-10-CM

## 2020-12-10 DIAGNOSIS — Z01.812 BLOOD TESTS PRIOR TO TREATMENT OR PROCEDURE: ICD-10-CM

## 2020-12-10 DIAGNOSIS — M17.11 OSTEOARTHRITIS OF RIGHT KNEE: Primary | ICD-10-CM

## 2020-12-10 LAB
ALBUMIN SERPL-MCNC: 4.1 G/DL (ref 3.4–5)
ALBUMIN/GLOB SERPL: 1.2 {RATIO} (ref 0.8–1.7)
ALP SERPL-CCNC: 70 U/L (ref 45–117)
ALT SERPL-CCNC: 17 U/L (ref 13–56)
ANION GAP SERPL CALC-SCNC: 1 MMOL/L (ref 3–18)
APPEARANCE UR: CLEAR
APTT PPP: 26.2 SEC (ref 23–36.4)
AST SERPL-CCNC: 14 U/L (ref 10–38)
ATRIAL RATE: 91 BPM
BACTERIA URNS QL MICRO: ABNORMAL /HPF
BASOPHILS # BLD: 0 K/UL (ref 0–0.1)
BASOPHILS NFR BLD: 0 % (ref 0–2)
BILIRUB SERPL-MCNC: 0.6 MG/DL (ref 0.2–1)
BILIRUB UR QL: NEGATIVE
BUN SERPL-MCNC: 14 MG/DL (ref 7–18)
BUN/CREAT SERPL: 16 (ref 12–20)
CALCIUM SERPL-MCNC: 10.3 MG/DL (ref 8.5–10.1)
CALCULATED P AXIS, ECG09: 61 DEGREES
CALCULATED R AXIS, ECG10: 30 DEGREES
CALCULATED T AXIS, ECG11: 15 DEGREES
CHLORIDE SERPL-SCNC: 105 MMOL/L (ref 100–111)
CO2 SERPL-SCNC: 35 MMOL/L (ref 21–32)
COLOR UR: YELLOW
CREAT SERPL-MCNC: 0.85 MG/DL (ref 0.6–1.3)
DIAGNOSIS, 93000: NORMAL
DIFFERENTIAL METHOD BLD: ABNORMAL
EOSINOPHIL # BLD: 0 K/UL (ref 0–0.4)
EOSINOPHIL NFR BLD: 1 % (ref 0–5)
EPITH CASTS URNS QL MICRO: ABNORMAL /LPF (ref 0–5)
ERYTHROCYTE [DISTWIDTH] IN BLOOD BY AUTOMATED COUNT: 14.6 % (ref 11.6–14.5)
ERYTHROCYTE [SEDIMENTATION RATE] IN BLOOD: 16 MM/HR (ref 0–30)
EST. AVERAGE GLUCOSE BLD GHB EST-MCNC: 131 MG/DL
GLOBULIN SER CALC-MCNC: 3.4 G/DL (ref 2–4)
GLUCOSE SERPL-MCNC: 116 MG/DL (ref 74–99)
GLUCOSE UR STRIP.AUTO-MCNC: NEGATIVE MG/DL
HBA1C MFR BLD: 6.2 % (ref 4.2–5.6)
HCT VFR BLD AUTO: 36.8 % (ref 35–45)
HGB BLD-MCNC: 12.2 G/DL (ref 12–16)
HGB UR QL STRIP: NEGATIVE
INR PPP: 1 (ref 0.8–1.2)
KETONES UR QL STRIP.AUTO: ABNORMAL MG/DL
LEUKOCYTE ESTERASE UR QL STRIP.AUTO: ABNORMAL
LYMPHOCYTES # BLD: 1.4 K/UL (ref 0.9–3.6)
LYMPHOCYTES NFR BLD: 27 % (ref 21–52)
MCH RBC QN AUTO: 30.3 PG (ref 24–34)
MCHC RBC AUTO-ENTMCNC: 33.2 G/DL (ref 31–37)
MCV RBC AUTO: 91.3 FL (ref 74–97)
MONOCYTES # BLD: 0.4 K/UL (ref 0.05–1.2)
MONOCYTES NFR BLD: 7 % (ref 3–10)
MUCOUS THREADS URNS QL MICRO: ABNORMAL /LPF
NEUTS SEG # BLD: 3.5 K/UL (ref 1.8–8)
NEUTS SEG NFR BLD: 65 % (ref 40–73)
NITRITE UR QL STRIP.AUTO: NEGATIVE
P-R INTERVAL, ECG05: 128 MS
PH UR STRIP: 6.5 [PH] (ref 5–8)
PLATELET # BLD AUTO: 279 K/UL (ref 135–420)
PMV BLD AUTO: 10.5 FL (ref 9.2–11.8)
POTASSIUM SERPL-SCNC: 4.2 MMOL/L (ref 3.5–5.5)
PROT SERPL-MCNC: 7.5 G/DL (ref 6.4–8.2)
PROT UR STRIP-MCNC: NEGATIVE MG/DL
PROTHROMBIN TIME: 13.2 SEC (ref 11.5–15.2)
Q-T INTERVAL, ECG07: 354 MS
QRS DURATION, ECG06: 86 MS
QTC CALCULATION (BEZET), ECG08: 435 MS
RBC # BLD AUTO: 4.03 M/UL (ref 4.2–5.3)
RBC #/AREA URNS HPF: ABNORMAL /HPF (ref 0–5)
SODIUM SERPL-SCNC: 141 MMOL/L (ref 136–145)
SP GR UR REFRACTOMETRY: 1.02 (ref 1–1.03)
UROBILINOGEN UR QL STRIP.AUTO: 1 EU/DL (ref 0.2–1)
VENTRICULAR RATE, ECG03: 91 BPM
WBC # BLD AUTO: 5.4 K/UL (ref 4.6–13.2)
WBC URNS QL MICRO: ABNORMAL /HPF (ref 0–5)

## 2020-12-10 PROCEDURE — 80053 COMPREHEN METABOLIC PANEL: CPT

## 2020-12-10 PROCEDURE — 87086 URINE CULTURE/COLONY COUNT: CPT

## 2020-12-10 PROCEDURE — 85610 PROTHROMBIN TIME: CPT

## 2020-12-10 PROCEDURE — 85025 COMPLETE CBC W/AUTO DIFF WBC: CPT

## 2020-12-10 PROCEDURE — 83036 HEMOGLOBIN GLYCOSYLATED A1C: CPT

## 2020-12-10 PROCEDURE — 85652 RBC SED RATE AUTOMATED: CPT

## 2020-12-10 PROCEDURE — 93005 ELECTROCARDIOGRAM TRACING: CPT

## 2020-12-10 PROCEDURE — 81001 URINALYSIS AUTO W/SCOPE: CPT

## 2020-12-10 PROCEDURE — 36415 COLL VENOUS BLD VENIPUNCTURE: CPT

## 2020-12-10 PROCEDURE — 85730 THROMBOPLASTIN TIME PARTIAL: CPT

## 2020-12-11 LAB
BACTERIA SPEC CULT: NORMAL
BACTERIA SPEC CULT: NORMAL
SERVICE CMNT-IMP: NORMAL

## 2020-12-13 LAB
BACTERIA SPEC CULT: NORMAL
SERVICE CMNT-IMP: NORMAL

## 2021-01-01 PROBLEM — Z96.659 FAILED TOTAL KNEE ARTHROPLASTY (HCC): Chronic | Status: ACTIVE | Noted: 2021-01-01

## 2021-01-01 PROBLEM — M17.11 PRIMARY LOCALIZED OSTEOARTHRITIS OF RIGHT KNEE: Chronic | Status: ACTIVE | Noted: 2021-01-01

## 2021-01-01 PROBLEM — T84.018A FAILED TOTAL KNEE ARTHROPLASTY (HCC): Chronic | Status: ACTIVE | Noted: 2021-01-01

## 2021-01-01 RX ORDER — DEXAMETHASONE SODIUM PHOSPHATE 4 MG/ML
8 INJECTION, SOLUTION INTRA-ARTICULAR; INTRALESIONAL; INTRAMUSCULAR; INTRAVENOUS; SOFT TISSUE ONCE
Status: CANCELLED | OUTPATIENT
Start: 2021-01-01 | End: 2021-01-01

## 2021-01-01 NOTE — DISCHARGE INSTRUCTIONS
[address] [place] [address] Sports Medicine   Patient Discharge Instructions    Tiera Melendez / [id number] : 1945    Admitted (Not on file) Discharged: 2021     IF YOU HAVE ANY PROBLEMS ONCE YOU ARE  [facility]:   Main office number: [phone]    Your follow up appointment to see either Dr. Nelson Abad PA-C, or [facility] JOESPH as scheduled in 2 weeks. If you are unsure of your appointment date call the office at [phone]. Medication Instructions     · Resume your home medictions as directed, you may have directed not to resume supplements until after your follow up. · A prescription for pain medication has been given   · It is important that you take the medication exactly as they are prescribed. · Keep your medication in the bottles provided by the pharmacist and keep a list of the medication names, dosages, and times to be taken in your wallet. · Do not take other medications without consulting your doctor. What to do at [address] [place] [address] Ave your prehospital diet. If you have excessive nausea or vomitting call your doctor's office. Be sure to maintain adequate fluid intake. Some pain medications may cause constipation. Remember to drink fluids, stay as active as possible, and eat plenty of fiber-rich foods. Begin In-Home Physical Therapy; 3 times a week to work on gait training, range of motion, strengthening, and weight bearing exercises as tolerable. Continue to use your walker or cane when walking. May progress from the walker to a cane to complete total bearing as tolerable. Patient may shower. Wrap incision with plastic wrap/covering to prevent incision from getting wet. Avoid complete immersion. YOUR DRESSING SHOULD BE CHANGED BY YOUR HOME HEALTH NURSE 5-7 AFTER SURGERY ACCORDING TO THE DATE WRITTEN ON YOUR DRESSING.       When to Call    - Call if you have a temperature greater then 101  - Unable to keep food down  - Are unable to bear any wieght   - Need a pain medication refill     Information obtained by :  I understand that if any problems occur once I am at home I am to contact my physician. I understand and acknowledge receipt of the instructions indicated above.                                                                                                                                            Physician's or R.N.'s Signature                                                                  Date/Time                                                                                                                                              Patient or Representative Signature                                                          Date/Time

## 2021-01-01 NOTE — H&P
[address] [facility] [address] Medicine  History and Physical Exam    Patient: Pedrito Howard MRN: [id number]  SSN: [id number]    YOB: 1945  Age: 70 y.o. Sex: female      Subjective:      Chief Complaint: Right knee pain    History of Present Illness:  Patient complains of pain to the right knee and difficulty ambulating, which has progressively worsened over several months. She is status post medial uni knee replacement with Annie Montiel in 2020. X-rays showed retained medial uni hardware with malalignment and radiolucency of the femur and tibia consistent with loosening of the joint. The patient's pain has persisted and progressed despite conservative treatments and therapies. The patient has been previously treated with nsaids. The patient has at this time opted for surgical intervention. Past Medical History:   Diagnosis Date    Arthritis     Chronic pain     knee    Diabetes ([address]     no medication exercise and diet control    GERD (gastroesophageal reflux disease)     Hiatal hernia     Hypertension     Primary localized osteoarthritis of right knee 2021     Past Surgical History:   Procedure Laterality Date    HX  SECTION      x 3    HX ORTHOPAEDIC Right 2020    total knee replacement    HX OTHER SURGICAL      removal of fatty tumor back x 2     HX TONSILLECTOMY       Social History     Occupational History    Not on file   Tobacco Use    Smoking status: Never Smoker    Smokeless tobacco: Never Used   Substance and Sexual Activity    Alcohol use: Yes     Comment: 1 per month    Drug use: Never    Sexual activity: Not Currently     Prior to Admission medications    Medication Sig Start Date End Date Taking? Authorizing Provider   ibuprofen (AdviL) 200 mg tablet Take  by mouth. Provider, Historical   acetaminophen (TYLENOL) 325 mg tablet Take 2 Tabs by mouth every six (6) hours.  20   Juan J Swann MD   aspirin 81 mg chewable tablet Take 2 Tabs by mouth daily. 8/25/20   Monique Garibay MD   pantoprazole (PROTONIX) 40 mg tablet Take 40 mg by mouth daily. Indications: gastroesophageal reflux disease    Provider, Historical   valsartan-hydroCHLOROthiazide (DIOVAN-HCT) 80-12.5 mg per tablet Take 1 Tab by mouth daily. Indications: high blood pressure    Provider, Historical   celecoxib (CeleBREX) 200 mg capsule Take 200 mg by mouth as needed for Pain. Provider, Historical   cholecalciferol (Vitamin D3) 25 mcg (1,000 unit) cap Take 5,000 Units by mouth daily. Provider, Historical   magnesium 250 mg tab Take  by mouth five (5) days a week. Provider, Historical       Allergies: Allergies   Allergen Reactions    Codeine Nausea and Vomiting        Review of Systems:  A comprehensive review of systems was negative except for that written in the History of Present Illness. Objective:       Physical Exam:  HEENT: Normocephalic, atraumatic  Lungs:  Clear to auscultation  Heart:   Regular rate and rhythm  Abdomen: Soft  Extremities:  Pain with range of motion of the right knee(s). Active extension decreased, active flexion decreased. Tenderness generalized. No deformity. No effusion. Positive crepitus. Antalgic gait. 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 Plan:       Proceed with scheduled REVISION RIGHT TOTAL KNEE ARTHROPLASTY/REMOVAL OF UNI HARDWARE/CONVERSION TO TKA. The various methods of treatment have been discussed with the patient and family. After consideration of risks, benefits, and other options for treatment, the patient has consented to surgical interventions. Questions were answered and preoperative teaching was done by Dr Adele Bucio. The patient would benefit from the use of Vancomycin for antibiotic prophylaxis due to increased risk of infection.     Signed By: INDY Price     January 1, 2021

## 2021-01-05 ENCOUNTER — HOSPITAL ENCOUNTER (OUTPATIENT)
Dept: PREADMISSION TESTING | Age: 76
Discharge: HOME | End: 2021-01-05
Payer: MEDICARE

## 2021-01-05 PROCEDURE — 87635 SARS-COV-2 COVID-19 AMP PRB: CPT

## 2021-01-07 LAB — SARS-COV-2, COV2NT: NOT DETECTED

## 2021-01-10 ENCOUNTER — ANESTHESIA EVENT (OUTPATIENT)
Dept: SURGERY | Age: 76
End: 2021-01-10
Payer: MEDICARE

## 2021-01-10 RX ORDER — MAGNESIUM SULFATE 100 %
16 CRYSTALS MISCELLANEOUS AS NEEDED
Status: CANCELLED | OUTPATIENT
Start: 2021-01-10

## 2021-01-10 RX ORDER — DEXTROSE MONOHYDRATE 100 MG/ML
125-250 INJECTION, SOLUTION INTRAVENOUS AS NEEDED
Status: CANCELLED | OUTPATIENT
Start: 2021-01-10

## 2021-01-10 RX ORDER — INSULIN LISPRO 100 [IU]/ML
INJECTION, SOLUTION INTRAVENOUS; SUBCUTANEOUS ONCE
Status: CANCELLED | OUTPATIENT
Start: 2021-01-10 | End: 2021-01-10

## 2021-01-11 ENCOUNTER — ANESTHESIA (OUTPATIENT)
Dept: SURGERY | Age: 76
End: 2021-01-11
Payer: MEDICARE

## 2021-01-11 ENCOUNTER — APPOINTMENT (OUTPATIENT)
Dept: GENERAL RADIOLOGY | Age: 76
End: 2021-01-11
Attending: PHYSICIAN ASSISTANT
Payer: MEDICARE

## 2021-01-11 ENCOUNTER — HOSPITAL ENCOUNTER (OUTPATIENT)
Age: 76
LOS: 1 days | Discharge: HOME HEALTH | End: 2021-01-11
Attending: ORTHOPAEDIC SURGERY | Admitting: ORTHOPAEDIC SURGERY
Payer: MEDICARE

## 2021-01-11 ENCOUNTER — HOME HEALTH ADMISSION (OUTPATIENT)
Dept: HOME HEALTH SERVICES | Facility: HOME HEALTH | Age: 76
End: 2021-01-11
Payer: MEDICARE

## 2021-01-11 VITALS
SYSTOLIC BLOOD PRESSURE: 150 MMHG | RESPIRATION RATE: 16 BRPM | HEIGHT: 68 IN | HEART RATE: 77 BPM | OXYGEN SATURATION: 99 % | TEMPERATURE: 97.9 F | DIASTOLIC BLOOD PRESSURE: 82 MMHG | BODY MASS INDEX: 26.98 KG/M2 | WEIGHT: 178 LBS

## 2021-01-11 DIAGNOSIS — M17.11 PRIMARY LOCALIZED OSTEOARTHRITIS OF RIGHT KNEE: Primary | Chronic | ICD-10-CM

## 2021-01-11 LAB
ABO + RH BLD: NORMAL
BLOOD GROUP ANTIBODIES SERPL: NORMAL
GLUCOSE BLD STRIP.AUTO-MCNC: 114 MG/DL (ref 70–110)
GLUCOSE BLD STRIP.AUTO-MCNC: 124 MG/DL (ref 70–110)
SPECIMEN EXP DATE BLD: NORMAL

## 2021-01-11 PROCEDURE — 74011250637 HC RX REV CODE- 250/637: Performed by: PHYSICIAN ASSISTANT

## 2021-01-11 PROCEDURE — 77030020782 HC GWN BAIR PAWS FLX 3M -B: Performed by: ORTHOPAEDIC SURGERY

## 2021-01-11 PROCEDURE — 74011250636 HC RX REV CODE- 250/636: Performed by: SPECIALIST

## 2021-01-11 PROCEDURE — 74011250636 HC RX REV CODE- 250/636: Performed by: ORTHOPAEDIC SURGERY

## 2021-01-11 PROCEDURE — 77030031139 HC SUT VCRL2 J&J -A: Performed by: ORTHOPAEDIC SURGERY

## 2021-01-11 PROCEDURE — 74011250636 HC RX REV CODE- 250/636: Performed by: PHYSICIAN ASSISTANT

## 2021-01-11 PROCEDURE — 77030002934 HC SUT MCRYL J&J -B: Performed by: ORTHOPAEDIC SURGERY

## 2021-01-11 PROCEDURE — 74011000258 HC RX REV CODE- 258: Performed by: ORTHOPAEDIC SURGERY

## 2021-01-11 PROCEDURE — 77030039760: Performed by: ORTHOPAEDIC SURGERY

## 2021-01-11 PROCEDURE — 76060000034 HC ANESTHESIA 1.5 TO 2 HR: Performed by: ORTHOPAEDIC SURGERY

## 2021-01-11 PROCEDURE — 77030037713 HC CLOSR DEV INCIS ZIP STRY -B: Performed by: ORTHOPAEDIC SURGERY

## 2021-01-11 PROCEDURE — 76210000006 HC OR PH I REC 0.5 TO 1 HR: Performed by: ORTHOPAEDIC SURGERY

## 2021-01-11 PROCEDURE — 77030033263 HC DRSG MEPILEX 16-48IN BORD MOLN -B: Performed by: ORTHOPAEDIC SURGERY

## 2021-01-11 PROCEDURE — 74011000250 HC RX REV CODE- 250: Performed by: SPECIALIST

## 2021-01-11 PROCEDURE — C1713 ANCHOR/SCREW BN/BN,TIS/BN: HCPCS | Performed by: ORTHOPAEDIC SURGERY

## 2021-01-11 PROCEDURE — 86901 BLOOD TYPING SEROLOGIC RH(D): CPT

## 2021-01-11 PROCEDURE — 76010000153 HC OR TIME 1.5 TO 2 HR: Performed by: ORTHOPAEDIC SURGERY

## 2021-01-11 PROCEDURE — 97161 PT EVAL LOW COMPLEX 20 MIN: CPT

## 2021-01-11 PROCEDURE — 77030038010: Performed by: ORTHOPAEDIC SURGERY

## 2021-01-11 PROCEDURE — 77030003666 HC NDL SPINAL BD -A: Performed by: ORTHOPAEDIC SURGERY

## 2021-01-11 PROCEDURE — 77030013708 HC HNDPC SUC IRR PULS STRY –B: Performed by: ORTHOPAEDIC SURGERY

## 2021-01-11 PROCEDURE — 2709999900 HC NON-CHARGEABLE SUPPLY: Performed by: ORTHOPAEDIC SURGERY

## 2021-01-11 PROCEDURE — 97116 GAIT TRAINING THERAPY: CPT

## 2021-01-11 PROCEDURE — 36415 COLL VENOUS BLD VENIPUNCTURE: CPT

## 2021-01-11 PROCEDURE — 64447 NJX AA&/STRD FEMORAL NRV IMG: CPT | Performed by: ORTHOPAEDIC SURGERY

## 2021-01-11 PROCEDURE — 97166 OT EVAL MOD COMPLEX 45 MIN: CPT

## 2021-01-11 PROCEDURE — 74011000250 HC RX REV CODE- 250: Performed by: NURSE ANESTHETIST, CERTIFIED REGISTERED

## 2021-01-11 PROCEDURE — 82962 GLUCOSE BLOOD TEST: CPT

## 2021-01-11 PROCEDURE — 97535 SELF CARE MNGMENT TRAINING: CPT

## 2021-01-11 PROCEDURE — 74011250637 HC RX REV CODE- 250/637: Performed by: ORTHOPAEDIC SURGERY

## 2021-01-11 PROCEDURE — 77030014144 HC TY SPN ANES BBMI -B: Performed by: NURSE ANESTHETIST, CERTIFIED REGISTERED

## 2021-01-11 PROCEDURE — 77030032491 HC SLV COMPR SCD KNE XL COVD -B: Performed by: ORTHOPAEDIC SURGERY

## 2021-01-11 PROCEDURE — C1776 JOINT DEVICE (IMPLANTABLE): HCPCS | Performed by: ORTHOPAEDIC SURGERY

## 2021-01-11 PROCEDURE — 73560 X-RAY EXAM OF KNEE 1 OR 2: CPT

## 2021-01-11 PROCEDURE — 74011000250 HC RX REV CODE- 250: Performed by: ORTHOPAEDIC SURGERY

## 2021-01-11 PROCEDURE — 77030027138 HC INCENT SPIROMETER -A: Performed by: ORTHOPAEDIC SURGERY

## 2021-01-11 PROCEDURE — 76942 ECHO GUIDE FOR BIOPSY: CPT | Performed by: ORTHOPAEDIC SURGERY

## 2021-01-11 PROCEDURE — 74011250637 HC RX REV CODE- 250/637: Performed by: SPECIALIST

## 2021-01-11 PROCEDURE — 77030034694 HC SCPL CANADY PLSM DISP USMD -E: Performed by: ORTHOPAEDIC SURGERY

## 2021-01-11 PROCEDURE — 74011250636 HC RX REV CODE- 250/636: Performed by: NURSE ANESTHETIST, CERTIFIED REGISTERED

## 2021-01-11 DEVICE — KNEE K1 TOT HEMI STD CEM IMPL CAPPED K1 OD: Type: IMPLANTABLE DEVICE | Site: KNEE | Status: FUNCTIONAL

## 2021-01-11 DEVICE — SMARTSET HV HIGH VISCOSITY BONE CEMENT 40G
Type: IMPLANTABLE DEVICE | Site: KNEE | Status: FUNCTIONAL
Brand: SMARTSET

## 2021-01-11 DEVICE — RT SIZE 5 NARROW FEMORAL CR NONPOROUS
Type: IMPLANTABLE DEVICE | Site: KNEE | Status: FUNCTIONAL
Brand: BKS TRIMAX

## 2021-01-11 DEVICE — SIZE 4 TIBIAL TRAY NONPOROUS
Type: IMPLANTABLE DEVICE | Site: KNEE | Status: FUNCTIONAL
Brand: BALANCED KNEE SYSTEM

## 2021-01-11 DEVICE — SIZE 4 13MM TIBIAL INSERT UC
Type: IMPLANTABLE DEVICE | Site: KNEE | Status: FUNCTIONAL
Brand: BKS E-VITALIZE

## 2021-01-11 DEVICE — 32MM PATELLA, VITAMIN E
Type: IMPLANTABLE DEVICE | Site: KNEE | Status: FUNCTIONAL
Brand: BKS E-VITALIZE

## 2021-01-11 RX ORDER — ACETAMINOPHEN 325 MG/1
650 TABLET ORAL EVERY 6 HOURS
Status: DISCONTINUED | OUTPATIENT
Start: 2021-01-11 | End: 2021-01-11 | Stop reason: HOSPADM

## 2021-01-11 RX ORDER — DEXTROSE MONOHYDRATE 100 MG/ML
125-250 INJECTION, SOLUTION INTRAVENOUS AS NEEDED
Status: DISCONTINUED | OUTPATIENT
Start: 2021-01-11 | End: 2021-01-11 | Stop reason: HOSPADM

## 2021-01-11 RX ORDER — OXYCODONE AND ACETAMINOPHEN 5; 325 MG/1; MG/1
1 TABLET ORAL AS NEEDED
Status: DISCONTINUED | OUTPATIENT
Start: 2021-01-11 | End: 2021-01-11 | Stop reason: HOSPADM

## 2021-01-11 RX ORDER — CELECOXIB 100 MG/1
200 CAPSULE ORAL ONCE
Status: COMPLETED | OUTPATIENT
Start: 2021-01-11 | End: 2021-01-11

## 2021-01-11 RX ORDER — BUPIVACAINE HYDROCHLORIDE 7.5 MG/ML
INJECTION, SOLUTION INTRASPINAL
Status: SHIPPED | OUTPATIENT
Start: 2021-01-11 | End: 2021-01-11

## 2021-01-11 RX ORDER — FENTANYL CITRATE 50 UG/ML
50 INJECTION, SOLUTION INTRAMUSCULAR; INTRAVENOUS
Status: DISCONTINUED | OUTPATIENT
Start: 2021-01-11 | End: 2021-01-11 | Stop reason: HOSPADM

## 2021-01-11 RX ORDER — CEFAZOLIN SODIUM/WATER 2 G/20 ML
2 SYRINGE (ML) INTRAVENOUS ONCE
Status: COMPLETED | OUTPATIENT
Start: 2021-01-11 | End: 2021-01-11

## 2021-01-11 RX ORDER — TRANEXAMIC ACID 650 1/1
1950 TABLET ORAL ONCE
Status: COMPLETED | OUTPATIENT
Start: 2021-01-11 | End: 2021-01-11

## 2021-01-11 RX ORDER — KETAMINE HYDROCHLORIDE 10 MG/ML
INJECTION, SOLUTION INTRAMUSCULAR; INTRAVENOUS AS NEEDED
Status: DISCONTINUED | OUTPATIENT
Start: 2021-01-11 | End: 2021-01-11 | Stop reason: HOSPADM

## 2021-01-11 RX ORDER — HYDROMORPHONE HYDROCHLORIDE 2 MG/ML
0.2 INJECTION, SOLUTION INTRAMUSCULAR; INTRAVENOUS; SUBCUTANEOUS
Status: DISCONTINUED | OUTPATIENT
Start: 2021-01-11 | End: 2021-01-11 | Stop reason: HOSPADM

## 2021-01-11 RX ORDER — PROPOFOL 10 MG/ML
INJECTION, EMULSION INTRAVENOUS
Status: DISCONTINUED | OUTPATIENT
Start: 2021-01-11 | End: 2021-01-11 | Stop reason: HOSPADM

## 2021-01-11 RX ORDER — ZOLPIDEM TARTRATE 5 MG/1
5-10 TABLET ORAL
Status: DISCONTINUED | OUTPATIENT
Start: 2021-01-11 | End: 2021-01-11 | Stop reason: HOSPADM

## 2021-01-11 RX ORDER — ACETAMINOPHEN 500 MG
1000 TABLET ORAL ONCE
Status: COMPLETED | OUTPATIENT
Start: 2021-01-11 | End: 2021-01-11

## 2021-01-11 RX ORDER — ONDANSETRON 2 MG/ML
INJECTION INTRAMUSCULAR; INTRAVENOUS AS NEEDED
Status: DISCONTINUED | OUTPATIENT
Start: 2021-01-11 | End: 2021-01-11 | Stop reason: HOSPADM

## 2021-01-11 RX ORDER — METOCLOPRAMIDE HYDROCHLORIDE 5 MG/ML
10 INJECTION INTRAMUSCULAR; INTRAVENOUS
Status: DISCONTINUED | OUTPATIENT
Start: 2021-01-11 | End: 2021-01-11 | Stop reason: HOSPADM

## 2021-01-11 RX ORDER — DEXAMETHASONE SODIUM PHOSPHATE 4 MG/ML
INJECTION, SOLUTION INTRA-ARTICULAR; INTRALESIONAL; INTRAMUSCULAR; INTRAVENOUS; SOFT TISSUE
Status: DISCONTINUED | OUTPATIENT
Start: 2021-01-11 | End: 2021-01-11 | Stop reason: HOSPADM

## 2021-01-11 RX ORDER — ONDANSETRON 2 MG/ML
4 INJECTION INTRAMUSCULAR; INTRAVENOUS
Status: DISCONTINUED | OUTPATIENT
Start: 2021-01-11 | End: 2021-01-11 | Stop reason: HOSPADM

## 2021-01-11 RX ORDER — LIDOCAINE HYDROCHLORIDE 20 MG/ML
INJECTION, SOLUTION EPIDURAL; INFILTRATION; INTRACAUDAL; PERINEURAL AS NEEDED
Status: DISCONTINUED | OUTPATIENT
Start: 2021-01-11 | End: 2021-01-11 | Stop reason: HOSPADM

## 2021-01-11 RX ORDER — CEFADROXIL 500 MG/1
500 CAPSULE ORAL 2 TIMES DAILY
Qty: 10 CAP | Refills: 0 | Status: SHIPPED | OUTPATIENT
Start: 2021-01-11 | End: 2021-01-16

## 2021-01-11 RX ORDER — MAGNESIUM SULFATE 100 %
4 CRYSTALS MISCELLANEOUS AS NEEDED
Status: DISCONTINUED | OUTPATIENT
Start: 2021-01-11 | End: 2021-01-11 | Stop reason: HOSPADM

## 2021-01-11 RX ORDER — ASPIRIN 325 MG
325 TABLET ORAL 2 TIMES DAILY
Qty: 42 TAB | Refills: 0 | Status: SHIPPED | OUTPATIENT
Start: 2021-01-11 | End: 2021-02-01

## 2021-01-11 RX ORDER — ASPIRIN 325 MG
325 TABLET, DELAYED RELEASE (ENTERIC COATED) ORAL 2 TIMES DAILY
Status: DISCONTINUED | OUTPATIENT
Start: 2021-01-11 | End: 2021-01-11 | Stop reason: HOSPADM

## 2021-01-11 RX ORDER — NALOXONE HYDROCHLORIDE 0.4 MG/ML
0.4 INJECTION, SOLUTION INTRAMUSCULAR; INTRAVENOUS; SUBCUTANEOUS AS NEEDED
Status: DISCONTINUED | OUTPATIENT
Start: 2021-01-11 | End: 2021-01-11 | Stop reason: HOSPADM

## 2021-01-11 RX ORDER — SODIUM CHLORIDE 9 MG/ML
125 INJECTION, SOLUTION INTRAVENOUS CONTINUOUS
Status: DISCONTINUED | OUTPATIENT
Start: 2021-01-11 | End: 2021-01-11 | Stop reason: HOSPADM

## 2021-01-11 RX ORDER — LANOLIN ALCOHOL/MO/W.PET/CERES
1 CREAM (GRAM) TOPICAL 3 TIMES DAILY
Status: DISCONTINUED | OUTPATIENT
Start: 2021-01-11 | End: 2021-01-11 | Stop reason: HOSPADM

## 2021-01-11 RX ORDER — SODIUM CHLORIDE 0.9 % (FLUSH) 0.9 %
5-40 SYRINGE (ML) INJECTION AS NEEDED
Status: DISCONTINUED | OUTPATIENT
Start: 2021-01-11 | End: 2021-01-11 | Stop reason: HOSPADM

## 2021-01-11 RX ORDER — KETOROLAC TROMETHAMINE 30 MG/ML
15 INJECTION, SOLUTION INTRAMUSCULAR; INTRAVENOUS
Status: DISCONTINUED | OUTPATIENT
Start: 2021-01-11 | End: 2021-01-11 | Stop reason: HOSPADM

## 2021-01-11 RX ORDER — VANCOMYCIN HYDROCHLORIDE
1250 ONCE
Status: COMPLETED | OUTPATIENT
Start: 2021-01-11 | End: 2021-01-11

## 2021-01-11 RX ORDER — SODIUM CHLORIDE 9 MG/ML
300 INJECTION, SOLUTION INTRAVENOUS CONTINUOUS
Status: DISCONTINUED | OUTPATIENT
Start: 2021-01-11 | End: 2021-01-11 | Stop reason: HOSPADM

## 2021-01-11 RX ORDER — SODIUM CHLORIDE, SODIUM LACTATE, POTASSIUM CHLORIDE, CALCIUM CHLORIDE 600; 310; 30; 20 MG/100ML; MG/100ML; MG/100ML; MG/100ML
100 INJECTION, SOLUTION INTRAVENOUS CONTINUOUS
Status: DISCONTINUED | OUTPATIENT
Start: 2021-01-11 | End: 2021-01-11 | Stop reason: HOSPADM

## 2021-01-11 RX ORDER — KETOROLAC TROMETHAMINE 30 MG/ML
15 INJECTION, SOLUTION INTRAMUSCULAR; INTRAVENOUS EVERY 6 HOURS
Status: DISCONTINUED | OUTPATIENT
Start: 2021-01-11 | End: 2021-01-11 | Stop reason: HOSPADM

## 2021-01-11 RX ORDER — DOCUSATE SODIUM 100 MG/1
100 CAPSULE, LIQUID FILLED ORAL 2 TIMES DAILY
Status: DISCONTINUED | OUTPATIENT
Start: 2021-01-11 | End: 2021-01-11 | Stop reason: HOSPADM

## 2021-01-11 RX ORDER — MAGNESIUM SULFATE 100 %
16 CRYSTALS MISCELLANEOUS AS NEEDED
Status: DISCONTINUED | OUTPATIENT
Start: 2021-01-11 | End: 2021-01-11 | Stop reason: HOSPADM

## 2021-01-11 RX ORDER — ROPIVACAINE HYDROCHLORIDE 5 MG/ML
INJECTION, SOLUTION EPIDURAL; INFILTRATION; PERINEURAL
Status: DISCONTINUED | OUTPATIENT
Start: 2021-01-11 | End: 2021-01-11 | Stop reason: HOSPADM

## 2021-01-11 RX ORDER — MIDAZOLAM HYDROCHLORIDE 1 MG/ML
INJECTION, SOLUTION INTRAMUSCULAR; INTRAVENOUS AS NEEDED
Status: DISCONTINUED | OUTPATIENT
Start: 2021-01-11 | End: 2021-01-11 | Stop reason: HOSPADM

## 2021-01-11 RX ORDER — VANCOMYCIN HYDROCHLORIDE
1250 ONCE
Status: DISCONTINUED | OUTPATIENT
Start: 2021-01-11 | End: 2021-01-11

## 2021-01-11 RX ORDER — ONDANSETRON 2 MG/ML
4 INJECTION INTRAMUSCULAR; INTRAVENOUS ONCE
Status: DISCONTINUED | OUTPATIENT
Start: 2021-01-11 | End: 2021-01-11 | Stop reason: HOSPADM

## 2021-01-11 RX ORDER — SODIUM CHLORIDE, SODIUM LACTATE, POTASSIUM CHLORIDE, CALCIUM CHLORIDE 600; 310; 30; 20 MG/100ML; MG/100ML; MG/100ML; MG/100ML
125 INJECTION, SOLUTION INTRAVENOUS CONTINUOUS
Status: DISCONTINUED | OUTPATIENT
Start: 2021-01-11 | End: 2021-01-11 | Stop reason: HOSPADM

## 2021-01-11 RX ORDER — VANCOMYCIN HYDROCHLORIDE
1250 EVERY 12 HOURS
Status: DISCONTINUED | OUTPATIENT
Start: 2021-01-11 | End: 2021-01-11 | Stop reason: HOSPADM

## 2021-01-11 RX ORDER — PREGABALIN 25 MG/1
25 CAPSULE ORAL ONCE
Status: COMPLETED | OUTPATIENT
Start: 2021-01-11 | End: 2021-01-11

## 2021-01-11 RX ORDER — SODIUM CHLORIDE 0.9 % (FLUSH) 0.9 %
5-40 SYRINGE (ML) INJECTION EVERY 8 HOURS
Status: DISCONTINUED | OUTPATIENT
Start: 2021-01-11 | End: 2021-01-11 | Stop reason: HOSPADM

## 2021-01-11 RX ORDER — ROPIVACAINE HYDROCHLORIDE 2 MG/ML
INJECTION, SOLUTION EPIDURAL; INFILTRATION; PERINEURAL
Status: DISCONTINUED | OUTPATIENT
Start: 2021-01-11 | End: 2021-01-11 | Stop reason: HOSPADM

## 2021-01-11 RX ORDER — OXYCODONE HYDROCHLORIDE 5 MG/1
5-10 TABLET ORAL
Status: DISCONTINUED | OUTPATIENT
Start: 2021-01-11 | End: 2021-01-11 | Stop reason: HOSPADM

## 2021-01-11 RX ORDER — DEXMEDETOMIDINE HYDROCHLORIDE 100 UG/ML
INJECTION, SOLUTION INTRAVENOUS
Status: DISCONTINUED | OUTPATIENT
Start: 2021-01-11 | End: 2021-01-11 | Stop reason: HOSPADM

## 2021-01-11 RX ORDER — FENTANYL CITRATE 50 UG/ML
25 INJECTION, SOLUTION INTRAMUSCULAR; INTRAVENOUS AS NEEDED
Status: DISCONTINUED | OUTPATIENT
Start: 2021-01-11 | End: 2021-01-11 | Stop reason: HOSPADM

## 2021-01-11 RX ORDER — MELOXICAM 7.5 MG/1
7.5 TABLET ORAL 2 TIMES DAILY
Qty: 28 TAB | Refills: 0 | Status: SHIPPED | OUTPATIENT
Start: 2021-01-11 | End: 2021-01-25

## 2021-01-11 RX ORDER — DEXAMETHASONE SODIUM PHOSPHATE 4 MG/ML
4 INJECTION, SOLUTION INTRA-ARTICULAR; INTRALESIONAL; INTRAMUSCULAR; INTRAVENOUS; SOFT TISSUE ONCE
Status: DISCONTINUED | OUTPATIENT
Start: 2021-01-11 | End: 2021-01-11 | Stop reason: HOSPADM

## 2021-01-11 RX ORDER — DIPHENHYDRAMINE HYDROCHLORIDE 50 MG/ML
12.5 INJECTION, SOLUTION INTRAMUSCULAR; INTRAVENOUS
Status: DISCONTINUED | OUTPATIENT
Start: 2021-01-11 | End: 2021-01-11 | Stop reason: HOSPADM

## 2021-01-11 RX ORDER — OXYCODONE HYDROCHLORIDE 5 MG/1
5 TABLET ORAL
Qty: 60 TAB | Refills: 0 | Status: SHIPPED | OUTPATIENT
Start: 2021-01-11 | End: 2021-01-18

## 2021-01-11 RX ORDER — PANTOPRAZOLE SODIUM 40 MG/1
40 TABLET, DELAYED RELEASE ORAL DAILY
Status: DISCONTINUED | OUTPATIENT
Start: 2021-01-11 | End: 2021-01-11 | Stop reason: HOSPADM

## 2021-01-11 RX ORDER — INSULIN LISPRO 100 [IU]/ML
INJECTION, SOLUTION INTRAVENOUS; SUBCUTANEOUS ONCE
Status: DISCONTINUED | OUTPATIENT
Start: 2021-01-11 | End: 2021-01-11 | Stop reason: HOSPADM

## 2021-01-11 RX ORDER — CEFAZOLIN SODIUM/WATER 2 G/20 ML
2 SYRINGE (ML) INTRAVENOUS EVERY 8 HOURS
Status: DISCONTINUED | OUTPATIENT
Start: 2021-01-11 | End: 2021-01-11 | Stop reason: HOSPADM

## 2021-01-11 RX ADMIN — ROPIVACAINE HYDROCHLORIDE 20 ML: 5 INJECTION, SOLUTION EPIDURAL; INFILTRATION; PERINEURAL at 09:31

## 2021-01-11 RX ADMIN — PREGABALIN 25 MG: 25 CAPSULE ORAL at 07:57

## 2021-01-11 RX ADMIN — BUPIVACAINE HYDROCHLORIDE IN DEXTROSE 15 MG: 7.5 INJECTION, SOLUTION SUBARACHNOID at 10:15

## 2021-01-11 RX ADMIN — LIDOCAINE HYDROCHLORIDE 60 MG: 20 INJECTION, SOLUTION EPIDURAL; INFILTRATION; INTRACAUDAL; PERINEURAL at 10:23

## 2021-01-11 RX ADMIN — CEFAZOLIN 2 G: 10 INJECTION, POWDER, FOR SOLUTION INTRAVENOUS at 09:38

## 2021-01-11 RX ADMIN — SODIUM CHLORIDE, SODIUM LACTATE, POTASSIUM CHLORIDE, AND CALCIUM CHLORIDE 125 ML/HR: 600; 310; 30; 20 INJECTION, SOLUTION INTRAVENOUS at 07:53

## 2021-01-11 RX ADMIN — SODIUM CHLORIDE, SODIUM LACTATE, POTASSIUM CHLORIDE, AND CALCIUM CHLORIDE 1000 ML: 600; 310; 30; 20 INJECTION, SOLUTION INTRAVENOUS at 07:53

## 2021-01-11 RX ADMIN — HYDROCHLOROTHIAZIDE: 12.5 CAPSULE ORAL at 15:19

## 2021-01-11 RX ADMIN — KETAMINE HYDROCHLORIDE 20 MG: 10 INJECTION, SOLUTION INTRAMUSCULAR; INTRAVENOUS at 09:23

## 2021-01-11 RX ADMIN — ROPIVACAINE HYDROCHLORIDE 20 MG: 2 INJECTION, SOLUTION EPIDURAL; INFILTRATION at 09:38

## 2021-01-11 RX ADMIN — MEPIVACAINE HYDROCHLORIDE 10 ML: 20 INJECTION, SOLUTION EPIDURAL; INFILTRATION at 09:31

## 2021-01-11 RX ADMIN — ONDANSETRON HYDROCHLORIDE 4 MG: 2 INJECTION INTRAMUSCULAR; INTRAVENOUS at 10:33

## 2021-01-11 RX ADMIN — DEXMEDETOMIDINE HYDROCHLORIDE 10 MCG: 100 INJECTION, SOLUTION INTRAVENOUS at 09:31

## 2021-01-11 RX ADMIN — MIDAZOLAM 2 MG: 1 INJECTION INTRAMUSCULAR; INTRAVENOUS at 09:23

## 2021-01-11 RX ADMIN — TRANEXAMIC ACID 1950 MG: 650 TABLET ORAL at 07:57

## 2021-01-11 RX ADMIN — MIDAZOLAM 1 MG: 1 INJECTION INTRAMUSCULAR; INTRAVENOUS at 10:37

## 2021-01-11 RX ADMIN — DEXAMETHASONE SODIUM PHOSPHATE 4 MG: 4 INJECTION, SOLUTION INTRAMUSCULAR; INTRAVENOUS at 09:31

## 2021-01-11 RX ADMIN — CELECOXIB 200 MG: 100 CAPSULE ORAL at 07:57

## 2021-01-11 RX ADMIN — VANCOMYCIN HYDROCHLORIDE 1250 MG: 10 INJECTION, POWDER, LYOPHILIZED, FOR SOLUTION INTRAVENOUS at 09:46

## 2021-01-11 RX ADMIN — ACETAMINOPHEN 1000 MG: 500 TABLET ORAL at 07:57

## 2021-01-11 RX ADMIN — PROPOFOL 50 MCG/KG/MIN: 10 INJECTION, EMULSION INTRAVENOUS at 10:23

## 2021-01-11 RX ADMIN — LIDOCAINE HYDROCHLORIDE 5 MG: 10 INJECTION, SOLUTION EPIDURAL; INFILTRATION; INTRACAUDAL; PERINEURAL at 10:15

## 2021-01-11 NOTE — PERIOP NOTES
TRANSFER - OUT REPORT:    Verbal report given to Sho Avila on Kimberlee Simon  being transferred to [facility] (unit) for routine progression of care       Report consisted of patients Situation, Background, Assessment and   Recommendations(SBAR). Information from the following report(s) SBAR, Kardex, Procedure Summary, Intake/Output and Cardiac Rhythm sr was reviewed with the receiving nurse. Lines:   Peripheral IV 01/11/21 (Active)   Site Assessment Clean, dry, & intact 01/11/21 1216   Phlebitis Assessment 0 01/11/21 1216   Infiltration Assessment 0 01/11/21 1216   Dressing Status Clean, dry, & intact 01/11/21 1216   Dressing Type Tape;Transparent 01/11/21 1216   Hub Color/Line Status Green; Infusing;Patent 01/11/21 1216        Opportunity for questions and clarification was provided.       Patient transported with:   Monitor  O2 @ 2 liters

## 2021-01-11 NOTE — ANESTHESIA PROCEDURE NOTES
Adductor    Performed by: Rupert Sheikh MD  Authorized by: Rupert Sheikh MD       Pre-procedure: Indications: at surgeon's request and post-op pain management    Preanesthetic Checklist: patient identified, risks and benefits discussed, site marked, timeout performed, anesthesia consent given and patient being monitored      Block Type:   Block Type:  Femoral single shot and adductor canal  Laterality:  Right  Monitoring:  Standard ASA monitoring, continuous pulse ox, frequent vital sign checks, heart rate, responsive to questions and oxygen  Injection Technique:  Single shot  Procedures: ultrasound guided    Patient Position: supine  Prep: chlorhexidine    Needle Type:  Stimuplex  Needle Gauge:  22 G  Medication Injected:  Mepivacaine (PF) 2 % (POLOCAINE) injection, 10 mL  ropivacaine (PF) (NAROPIN)(0.5%) 5 mg/mL injection, 20 mL  dexamethasone (DECADRON) 4 mg/mL injection, 4 mg  dexmedeTOMidine (PRECEDEX) 100 mcg/mL iv solution, 10 mcg  Med Admin Time: 2021 9:31 AM    Assessment:  Number of attempts:  1  Injection Assessment:  Incremental injection every 5 mL, local visualized surrounding nerve on ultrasound, negative aspiration for CSF, negative aspiration for blood, no paresthesia, no intravascular symptoms and ultrasound image on chart  Patient tolerance:  Patient tolerated the procedure well with no immediate complications  Maureen    = [id number]  CSN = [id number]    Femoral nerv identified by ultrasound proximal adductor canal.    90 mm Stimuplex Ultra. Local anesthetic injected without resistance and with gentle aspiration every 3-5 ml with direct visualization with ultrasound     Patient tolerated procedure well, vital signs stable throughout, with no apparent complications. Entire procedure completed prior to start of anesthesia time.      Dexamethasone really 10mg/ ml PF      Maureen    = [id number]  CSN = [id number]

## 2021-01-11 NOTE — PROGRESS NOTES
1246- Same Day Discharge     [phone]- Patient arrives to unit at this time. Dual skin assessment completed with Gavi BLAIR RN no abnormalities noted other than surgical incision to right knee. Patient is A/O x 4, unable to transfer to recliner due to spinal block. Lungs clear, radial pulses present , pedal pulses present , abdomen soft and non-distended. Bowel sounds active, 18 G IV to RFA,  intact and patent infusing. No signs of phlebitis or infiltration noted. TEDS bilaterally. Skin warm and dry  with mepilex dressing to right knee CDI. Patient oriented to room to include use of call bell, meal ordering, and use of incentive spirometer, patient able to get IS to 1750. Patient instructed to order lunch and given explanation of home for dinner plan. Phone and call bell left within reach. Educated on pain medication availability and possible side effects, as well as need to call for assistance before getting out of bed. Patient verbalized understanding.       Symptoms present on arrival:  [] Pain 0/10   [] Medicated  [] Nausea   [] Medicated   [] Hypotension/Hypertension   [] Provider notified

## 2021-01-11 NOTE — INTERVAL H&P NOTE
Update History & Physical 
 
The Patient's History and Physical of January 1,2020,  
  was reviewed with the patient and I examined the patient. There was no change. The surgical site was confirmed by the patient and me. Plan:  The risk, benefits, expected outcome, and alternative to the recommended procedure have been discussed with the patient. Patient understands and wants to proceed with the procedure.  
 
Electronically signed by Stacy Heaton MD on 1/11/2021 at 7:02 AM

## 2021-01-11 NOTE — PERIOP NOTES
Patient transferred to unit 238 with nurse at bedside. VS stabled.      Primary Nurse Andrew Hernandez and Reinaldo Fields, RN performed a dual skin assessment on this patient No impairment noted      Visit Vitals  BP (!) 144/77 (BP 1 Location: Left arm, BP Patient Position: At rest)   Pulse 75   Temp 97.6 °F (36.4 °C)   Resp 14   Ht 5' 8\" (1.727 m)   Wt 80.7 kg (178 lb)   SpO2 100%   BMI 27.06 kg/m²       Intake/Output Summary (Last 24 hours) at 1/11/2021 1239  Last data filed at 1/11/2021 1216  Gross per 24 hour   Intake 1950 ml   Output 250 ml   Net 1700 ml

## 2021-01-11 NOTE — ANESTHESIA POSTPROCEDURE EVALUATION
Procedure(s):  RIGHT KNEE REVISION, REMOVAL OF UNI PLACEMENT OF TOTAL. regional    Anesthesia Post Evaluation        Comments: Post-Anesthesia Evaluation and Assessment    Cardiovascular Function/Vital Signs  BP (!) 144/77 (BP 1 Location: Left arm, BP Patient Position: At rest)   Pulse 75   Temp 36.4 °C (97.6 °F)   Resp 14   Ht 5' 8\" (1.727 m)   Wt 80.7 kg (178 lb)   SpO2 100%   BMI 27.06 kg/m²     Patient is status post Procedure(s):  RIGHT KNEE REVISION, REMOVAL OF UNI PLACEMENT OF TOTAL. Nausea/Vomiting: Controlled. Postoperative hydration reviewed and adequate. Pain:  Pain Scale 1: Numeric (0 - 10) (01/11/21 1246)  Pain Intensity 1: 0 (01/11/21 1246)   Managed. Neurological Status:   Neuro (WDL): Exceptions to WDL (01/11/21 1216)   At baseline. Mental Status and Level of Consciousness: Arousable. Pulmonary Status:   O2 Device: Room air (01/11/21 1237)   Adequate oxygenation and airway patent. Complications related to anesthesia: None    Post-anesthesia assessment completed. No concerns. Patient has met all discharge requirements. Signed By: Yudith Camarillo MD    January 11, 2021                     INITIAL Post-op Vital signs:   Vitals Value Taken Time   /71 01/11/21 1224   Temp 36.8 °C (98.2 °F) 01/11/21 1216   Pulse 74 01/11/21 1229   Resp 18 01/11/21 1229   SpO2 100 % 01/11/21 1229   Vitals shown include unvalidated device data.

## 2021-01-11 NOTE — PROGRESS NOTES
OCCUPATIONAL THERAPY EVALUATION/DISCHARGE    Patient: Stanley Bills (17 y.o. female)  Date: 1/11/2021  Primary Diagnosis: Primary localized osteoarthritis of right knee [M17.11]  Procedure(s) (LRB):  RIGHT KNEE REVISION, REMOVAL OF UNI PLACEMENT OF TOTAL (Right) Day of Surgery   Precautions:  Fall(R TKR (rev))  PLOF: Pt grossly mod I    ASSESSMENT AND RECOMMENDATIONS:  Based on the objective data described below, the patient presents with RLE decreased ROM and strength affecting LE ADLs. Patient able to utilize BLE bending forward for sock donning. Pt will require assist w/ compression hose which patient reports daughter can assist.  Phillip Franklinuel on compensatory strategies for compression hose donning utilizing plastic bag over foot, including removal once donned; and how to roll/scrunch compression hose for increased ease & hand joint protection when doffing. Daughter present for instruction. Pt able to dress self with clothing, walk to bathroom for toileting and hand hygiene w/ Supervision. Provided opportunity for patient to voice questions/concerns on ADL performance when home, patient voiced no further concerns. Education: Reviewed home safety, body mechanics, importance of moving every hour to prevent joint stiffness, role of ice for edema/pain control, Rolling Walker management/safety, and adaptive dressing techniques with patient verbalizing  understanding at this time     Skilled Occupational Therapy is not indicated at this time in this setting. Patient should continue to improve as pain and ROM/strength improves. Discharge Recommendations: Home Health with responsible adult care at least 24 hours upon hospital d/c  Further Equipment Recommendations for Discharge: To Be Determined (TBD) at next level of care (?shower chair)      SUBJECTIVE:   Patient stated De Jasmine can I go up my stairs? 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    GERD (gastroesophageal reflux disease)     Hiatal hernia     Hypertension     Primary localized osteoarthritis of right knee 2021     Past Surgical History:   Procedure Laterality Date    HX  SECTION      x 3    HX ORTHOPAEDIC Right 2020    total knee replacement    HX OTHER SURGICAL      removal of fatty tumor back x 2     HX TONSILLECTOMY       Barriers to Learning/Limitations: None  Compensate with: visual, verbal, tactile, kinesthetic cues/model    Home Situation/Prior level of Function: supportive daughter staying w/ her post-op  Home Situation  Home Environment: Private residence  # Steps to Enter: 4  Rails to Enter: Yes  Hand Rails : Bilateral(wide)  One/Two Story Residence: Two story  # of Interior Steps: 12  Interior Rails: Right  Lift Chair Available: No  Living Alone: Yes  Support Systems: Child(wong)(daughter will assist)  Patient Expects to be Discharged to[de-identified] Private residence  Current DME Used/Available at Home: alesia Lynch, [address] [facility] [place], straight, [employer], bedside  Tub or Shower Type: Shower(1/2 bath)  [x]  Right hand dominant   []  Left hand dominant    Cognitive/Behavioral Status:  Neurologic State: Alert; Appropriate for age  Orientation Level: Appropriate for age;Oriented X4  Cognition: Appropriate decision making; Appropriate for age attention/concentration; Appropriate safety awareness; Follows commands  Safety/Judgement: Awareness of environment; Fall prevention; Insight into deficits    Skin: R knee incision w/ Mepilex/Orthofoam   Edema: compression hose in place & applied ice     Coordination:  Coordination: Within functional limits  Fine Motor Skills-Upper: Left Intact; Right Intact    Gross Motor Skills-Upper: Left Intact; Right Intact    Balance:  Sitting: Intact  Standing: Intact; With support    Strength: BUE  Strength: Generally decreased, functional    Tone & Sensation:BUE  Tone: Normal  Sensation: Intact    Range of Motion:BUE  AROM: Generally decreased, functional  PROM: Generally decreased, functional    Functional Mobility and Transfers for ADLs:  Transfers:  Sit to Stand: Supervision; Adaptive equipment  Bed to Chair: Supervision; Adaptive equipment   Toilet Transfer : Supervision    ADL Assessment/Intervention:  Feeding: Modified independent  Oral Facial Hygiene/Grooming: Supervision  Bathing: Setup  Upper Body Dressing: Setup  Lower Body Dressing: Minimum assistance  Toileting: Supervision    Grooming  Position Performed: Standing  Washing Hands: Supervision    Upper Body Dressing Assistance  Pullover Shirt: Set-up  Front Opened Shirt: Set-up  Lower Body Dressing Assistance  Pants With Elastic Waist: Set-up  Socks: Stand-by assistance  Position Performed: Seated in chair    LE Adaptive Equipment:  [x] Adaptive Equipment was not issued due to patient able to complete with out use of AE and use of AE would prevent stretching needed to progress with recovery. (Patient able to complete w/ compensatory strategies and able to compensate for socks w/ clothing modifications, but will require assist with Compression hose). Toileting  Bladder Hygiene: Supervision  Clothing Management: Supervision  Adaptive Equipment: Walker    Cognitive Retraining  Safety/Judgement: Awareness of environment; Fall prevention; Insight into deficits    Pain:  Pain level pre-treatment: 2/10  Pain level post-treatment: 2/10  Pain Intervention(s): Medication administer by Nursing (see MAR); Rest, Ice, Repositioning   Response to intervention: Nurse notified, see doc flow sheet    Please refer to the flowsheet for vital signs taken during this treatment.   After treatment:   [x]  Patient left in no apparent distress sitting up in chair  []  Patient sitting on EOB  []  Patient left in no apparent distress in bed  [x]  Call bell left within reach  [x]  Nursing notified/Holley  [x]  Caregiver present/daughter (Essie)  [x]  Ice applied  []  SCD's on while back in bed    COMMUNICATION/EDUCATION:   Communication/Collaboration:  [x]       Role of Occupational Therapy in the acute care setting.  [x]      Home safety education was provided and the patient/caregiver indicated understanding.  [x]      Patient/family have participated as able in goal setting and plan of care.  [x]      Patient/family agree to work toward stated goals and plan of care.  []      Patient understands intent and goals of therapy, but is neutral about his/her participation.  []      Patient is unable to participate in plan of care at this time.    Thank you for this referral.  Katherin Slater, OTR/L, CSRS. CDCS, CFPS  Time Calculation: 26 mins    Eval Complexity: History: HIGH Complexity : Extensive review of history including physical, cognitive and psychosocial history ;   Examination: MEDIUM Complexity : 3-5 performance deficits relating to physical, cognitive , or psychosocial skils that result in activity limitations and / or participation restrictions;   Decision Making:MEDIUM Complexity : Patient may present with comorbidities that affect occupational performnce. Miniml to moderate modification of tasks or assistance (eg, physical or verbal ) with assesment(s) is necessary to enable patient to complete evaluation

## 2021-01-11 NOTE — OP NOTES
[address] [facility]  Total Knee Arthroplasty    Patient: Maribel Peguero MRN: [id number]  SSN: [id number]    YOB: 1945  Age: 70 y.o. Sex: female      Date of Surgery: 1/11/2021   Preoperative Diagnosis: FAILED JOINT IMPLANT and progressive djd  Postoperative Diagnosis: FAILED JOINT IMPLANT   Location: [facility]  Surgeon: Stacy Heaton MD  Assistant: Jose Gil PA-C    Anesthesia: Spinal and adductor canal Nerve Block    Procedure: revision right Total Knee Arthroplasty: conversion uni to total  The complexity of the total joint surgery requires the use of a first assistant for positioning, retraction and assistance in closure. Tourniquet Time: Tourniquet not used. Estimated Blood Loss: Less than 100cc     Implants:   Implant Name Type Inv. Item Serial No.  Lot No. LRB No. Used Action   CEMENT BNE 40GM FULL DOSE PMMA W/O ANTIBIO HI VISC N RADPQ - [id number]  CEMENT BNE 40GM FULL DOSE PMMA W/O ANTIBIO HI VISC N RADPQ  [employer] [id number] Right 2 Implanted   INSERT TIB SZ 4 [id number] UCONG BKS E VITALIZE - [id number]  INSERT TIB SZ 4 [id number] UCONG BKS E VITALIZE  [employer] [id number] Right 1 Implanted   COMPONENT PAT H32MM STD E VITALIZE SELVIN DOUGLAS RND BAL SYS - [id number]  COMPONENT PAT H32MM STD E VITALIZE SELVIN DOUGLAS RND BAL SYS  [employer] [id number] Right 1 Implanted   TRAY TIB SZ 4 NP A BAL KNEE - [id number]  TRAY TIB SZ 4 NP A BAL KNEE  [employer] [id number] Right 1 Implanted   COMPONENT FEM SZ 5 R KNEE NP CRUCE RET ONEIDA BKS TRIMAX - [id number]  COMPONENT FEM SZ 5 R KNEE NP CRUCE RET ONEIDA BKS TRIMAX  [employer] [id number] Right 1 Implanted        Specimens: None    Additional Findings: None     Complications: none    Body Mass Index: Body mass index is 27.06 kg/m².     Procedure Detail:  Prior to the surgery the patient was administered a femoral nerve block in the preoperative holding area by the anesthesiologist. Onelia Nava was brought to the operating room and positioned on the operating table. She was anesthetized with anesthesia. Intravenous antibiotics were administered. Prior to the incision being made a timeout was called identifying the patient, procedure, operative side, and surgeon. A pneumatic tourniquet was placed about the limb and the right leg was prepped and draped in the usual sterile manner. The tourniquet was not inflated throughout the case. A midline anterior incision made over the knee. The incision was carried down through the subcutaneous tissue to the underlying capsule. A medial parapatellar capsular incision was performed. The medial capsular flap was carefully elevated around to the posterior medial corner protecting the medial collateral ligaments and the fibers. The patella was sized with a caliper, and approximately 10-12 mm was resected with an oscillating saw allowing the patella to be slid into the lateral gutter. It was not everted throughout the case. The uni components were then removed with osteotomes with little bone loss. Our attention was first turned to the distal femur and using intermedullary instrumentation, a 5-degree valgus cut was on the distal end of the femur. The distal end of the femur was sized to a size 5N femoral component. Pins were inserted through the sizer and the corresponding 4-in-1 block was slid into place and pinned for stability. Anterior and posterior and chamfer cuts were made to accommodate the femoral component. The medial and lateral menisci were excised as were the anterior cruciate ligaments. Our attention was then turned to the tibia. Using extramedullary instrumentation, a 3-degree cut was made on the proximal end of the tibia. A spacer block was placed to show gaps had been balanced and a size 4  tibial base plate was placed on the tibia and pinned into place.  Intramedullary reaming guide was placed on the tibia and the appropriate reamer was used followed by the keel punch to complete the preparation of the tibia. A trial femoral component was then impacted on to the distal end of the femur. Trial reduction was then performed with incremental size trial bearing surfaces. The orthodevelopment BKS trimax UC 13mm bearing surface was inserted and allowed for full extension, good medial, lateral stability at 90 degrees of flexion, especially medially. Our attention was then turned to the patella. The patella was sized to a 32mm patella. The guide was pinned, placed over patella, 3 holes were drilled. Trial patella button was inserted and the patella was reduced in the knee. The patella tracked normally using no-touch technique. The trial components were then all removed. The real components were opened on the back. The cut surfaces of the bone were prepared using the PulsaVac lavage. Prior to this, the Aquamantys was used to cauterize any soft tissue bleeding. 40 grams of Depuy HV cement was mixed. The femoral and tibial components  and patellar component was cemented into place. Excess cement was removed from around the edge of bone using plastic curette. Once the cement had hardened, the knee was placed through range of motion and noted to be stable as mentioned above with the trail components. The wound was dry, therefore no drain was used. The operative knee was injected with 20 cc of exparel. The knee was then soaked with a diluted betadine solution for approximately 3 min. This was then thoroughly irrigated. The capsular layer was closed using a #2 stratafix suture with the knee flexed 90 degrees, while subcutaneous layers were closed using 2-0 Vicryl suture. Finally the skin was closed using Prineo, which were applied in occlusive fashion and sterile bandage applied. All sponge and needle counts were correct. She was taken to the recovery room extubated in stable condition.     Signed By: Enma Reyes MD     January 11, 2021

## 2021-01-11 NOTE — DISCHARGE SUMMARY
[address] [address]     DISCHARGE SUMMARY     PATIENT: Caitlyn Yarbrough     MRN: [id number]   ADMIT DATE: 2021   [facility]   DISCHARGE DATE:      ATTENDING: Fredy Menchaca MD   DICTATING: INDY Esteves         ADMISSION DIAGNOSIS: Primary localized osteoarthritis of right knee [M17.11]    DISCHARGE DIAGNOSIS: Status post RIGHT KNEE ARTHROPLASTY/CONVERSION UNI TO TOTAL    HISTORY OF PRESENT ILLNESS: The patient is a 70y.o. year-old female   with ongoing right knee pain secondary to osteoarthritis of her right knee with retained medial uni knee components. The patient's pain has persisted and progressed despite conservative treatments and therapies. The patient has at this time opted for surgical intervention. 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    GERD (gastroesophageal reflux disease)     Hiatal hernia     Hypertension     Primary localized osteoarthritis of right knee 2021       PAST SURGICAL HISTORY:   Past Surgical History:   Procedure Laterality Date    HX  SECTION      x 3    HX ORTHOPAEDIC Right 2020    total knee replacement    HX OTHER SURGICAL      removal of fatty tumor back x 2     HX TONSILLECTOMY         ALLERGIES:   Allergies   Allergen Reactions    Codeine Nausea and Vomiting        CURRENT MEDICATIONS:  A list of medications prior to the time of admission include:  Prior to Admission medications    Medication Sig Start Date End Date Taking? Authorizing Provider   aspirin (ASPIRIN) 325 mg tablet Take 1 Tab by mouth two (2) times a day for 21 days. 21 Yes Scott Pfeiffer PA   cefadroxil (DURICEF) 500 mg capsule Take 1 Cap by mouth two (2) times a day for 5 days.  21 Yes INDY Farmer meloxicam (MOBIC) 7.5 mg tablet Take 1 Tab by mouth two (2) times a day for 14 days. 21 Yes Scott Pfeiffer PA   oxyCODONE IR (ROXICODONE) 5 mg immediate release tablet Take 1 Tab by mouth every four (4) hours as needed for Pain for up to 7 days. Max Daily Amount: 30 mg. 21 Yes Scott Pfeiffer PA   ibuprofen (AdviL) 200 mg tablet Take  by mouth. Yes Provider, Historical   acetaminophen (TYLENOL) 325 mg tablet Take 2 Tabs by mouth every six (6) hours. 20  Yes Kendrick Baca MD   aspirin 81 mg chewable tablet Take 2 Tabs by mouth daily. Patient taking differently: Take 81 mg by mouth. 20  Yes Kendrick Baca MD   pantoprazole (PROTONIX) 40 mg tablet Take 40 mg by mouth daily. Indications: gastroesophageal reflux disease   Yes Provider, Historical   valsartan-hydroCHLOROthiazide (DIOVAN-HCT) 80-12.5 mg per tablet Take 1 Tab by mouth daily. Indications: high blood pressure   Yes Provider, Historical   celecoxib (CeleBREX) 200 mg capsule Take 200 mg by mouth as needed for Pain. Yes Provider, Historical   cholecalciferol (Vitamin D3) 25 mcg (1,000 unit) cap Take 5,000 Units by mouth daily. Yes Provider, Historical   magnesium 250 mg tab Take  by mouth five (5) days a week. Yes Provider, Historical       FAMILY HISTORY: History reviewed. No pertinent family history. SOCIAL HISTORY:   Social History     Socioeconomic History    Marital status:      Spouse name: Not on file    Number of children: Not on file    Years of education: Not on file    Highest education level: Not on file   Tobacco Use    Smoking status: Never Smoker    Smokeless tobacco: Never Used   Substance and Sexual Activity    Alcohol use: Yes     Comment: 1 per month    Drug use: Never    Sexual activity: Not Currently       REVIEW OF SYSTEMS: All review of systems are negative. PHYSICAL EXAMINATION: For a detailed physical exam, please refer to the patient's chart.     [facility] COURSE: The patient was taken to surgery the day of admission. she underwent a revision right knee replacement/conversion uni to total. Operative course was benign. Estimated blood loss was approximately 150 cc. The patient was taken to the PACU in stable condition and was later taken to the floor in stable condition. During her hospital stay, the patient progressed well with physical therapy and occupational therapy, adherent to instructions. she had been cleared by physical therapy with stair training. she was placed on Aspirin for DVT prophylaxis. her pain has been well controlled with oral pain medications. her vitals have remained stable. she has also remained hemodynamically stable. The patient has been recommended for discharge home. DISCHARGE INSTRUCTIONS: The patient is to be discharged home. she is to continue on her prior medications per the medication reconciliation form, to which we will add:   1. Aspirin 325 mg; 1 tablet po bid x 21 days  2. Mobic 7.5 mg; 1 tablet po bid x 14 days  3. Roxicodone 5 mg; 1 tablets p.o. every 4 hours p.r.n. for pain  4. Duricef 500 mg; 1 tablet p.o. every 12 hours x 5 days    The patient is to continue at home with home physical therapy 3 times a week to work on gait training, range of motion, strengthening, and weightbearing exercises as tolerated on her right lower extremity. The patient is to progress from a walker to a cane to complete total weightbearing as tolerable. The patient is to continue to keep her incision dry. The patient is to followup with Dr. Adele Bucio, Shadi Jorgensen PA-C and/or Isabella Snyder PA-C in the office approximately 10-14 days status post for x-rays and further evaluation.     Maribel Del Rio 8326, [address]  9/17/16483:68 PM

## 2021-01-11 NOTE — PERIOP NOTES
Pt. Used restroom in pre-op area with assistance. Patient placed on Jenna Paws for a minimum of 30 min in  Preop.

## 2021-01-11 NOTE — ANESTHESIA PREPROCEDURE EVALUATION
Relevant Problems   No relevant active problems       Anesthetic History   No history of anesthetic complications     Pertinent negatives: No PONV       Review of Systems / Medical History  Patient summary reviewed, nursing notes reviewed and pertinent labs reviewed    Pulmonary              Pertinent negatives: No COPD and smoker     Neuro/Psych   Within defined limits           Cardiovascular    Hypertension            Pertinent negatives: No CAD and PAD  Exercise tolerance: >4 METS     GI/Hepatic/Renal     GERD        Pertinent negatives: No liver disease and renal disease   Endo/Other    Diabetes ( diet): well controlled    Arthritis     Other Findings   Comments: etoh o.25/ week           Physical Exam    Airway  Mallampati: II  TM Distance: 4 - 6 cm  Neck ROM: normal range of motion   Mouth opening: Normal     Cardiovascular               Dental    Dentition: Lower partial plate     Pulmonary                 Abdominal  GI exam deferred       Other Findings            Anesthetic Plan    ASA: 2  Anesthesia type: regional - femoral single shot and IPACK block      Post-op pain plan if not by surgeon: peripheral nerve block single    Induction: Intravenous  Anesthetic plan and risks discussed with: Patient      Risk of nerve injury discussed. Can be expressed by pain, numbness, weakness. May or may not recover. Spinal has several small advantages over general anesthesia. All are small but definite differences that have been shown in studies  Lower infection rate  Less Blood loss  Less Pain  Less N&V  Quicker times to discharge. Less exposure to drugs that have been thought to affect the mind afterwards. Data not solid on this one yet.

## 2021-01-11 NOTE — PROGRESS NOTES
Problem: Mobility Impaired (Adult and Pediatric)  Goal: *Acute Goals and Plan of Care (Insert Text)  Description: PT goals to be met in 1 day:  Pt will be able to perform supine<>sit SBA for transfers at home. Pt will be able to perform sit<>stand SBA for increased ability to transfer at home safely. Pt will be able to participate in gt training >130' w/ RW, WBAT, GB and CGA/SBA for improved ability in home upon d/c. Pt will be able to perform stair training step to pattern, B/U rail and CGA to obtain safe entry into home upon d/c. Pt will be educated regarding HEP per MD protocol for optimal AROM/strength outcomes. Note: [x]  Patient has met MD mobilization critieria for d/c home   [x]  Recommend HH with 24 hour adult care   []  Benefit from additional acute PT session to address:      PHYSICAL THERAPY EVALUATION    Patient: Helio Lantigua (75 y.o. female)  Date: 1/11/2021  Primary Diagnosis: Primary localized osteoarthritis of right knee [M17.11]  Procedure(s) (LRB):  RIGHT KNEE REVISION, REMOVAL OF UNI PLACEMENT OF TOTAL (Right) Day of Surgery   Precautions:   Fall, WBAT    PLOF: Independent functional mobility w/o use of AD    ASSESSMENT :  Based on the objective data described below, the patient presents with decreased mobility in regards to bed mobility, transfers, gt quality and tolerance, balance, stair negotiation and safety due to R TKA rev surgery. Decreased AROM of R knee, dec strength of R knee, pain in R knee, dec sensation of R knee also impacting pt functional mobility. Pt rating pain on numerical pain scale pre/post and during session 0/10. Pt and daughter ed regarding mobility safety, WB, HEP, ice application/use, elevation, environmental safety and home safe techniques. Pt sitting in chair upon arrival.  Pt able to transfer sit<>stand w/ S. Safety vc required throughout session to reinforce safety. Pt able to participate in gt training using RW, GB, WBAT and CGA w/ antalgic gt pattern. Pt was able to participate in stair training using step to pattern, B rails and CGA/SBA. Answered questions by pt and daughter in regards to PT and mobility. Pt left sitting in recliner w/ all needs within reach and ice pack to R knee. Nurse Ryann Linares aware of session and outcomes. Recommend HHPT with responsible adult care at least 24 hours upon hospital d/c. Patient will benefit from skilled intervention to address the above impairments. Patient's rehabilitation potential is considered to be Good  Factors which may influence rehabilitation potential include:   []         None noted  []         Mental ability/status  []         Medical condition  []         Home/family situation and support systems  []         Safety awareness  [x]         Pain tolerance/management  []         Other:      PLAN :  Recommendations and Planned Interventions:   [x]           Bed Mobility Training             []    Neuromuscular Re-Education  [x]           Transfer Training                   []    Orthotic/Prosthetic Training  [x]           Gait Training                          [x]    Modalities  [x]           Therapeutic Exercises           [x]    Edema Management/Control  [x]           Therapeutic Activities            [x]    Family Training/Education  [x]           Patient Education  []           Other (comment):    Frequency/Duration: Patient will be followed by physical therapy twice daily to address goals. Discharge Recommendations: Home Health  Further Equipment Recommendations for Discharge: N/A     SUBJECTIVE:   Patient stated I feel good.     OBJECTIVE DATA SUMMARY:     Past Medical History:   Diagnosis Date    Arthritis     Chronic pain     knee    Diabetes ([facility] [address]     no medication exercise and diet control    Failed total knee arthroplasty ([facility] [address] 1/1/2021    GERD (gastroesophageal reflux disease)     Hiatal hernia     Hypertension     Primary localized osteoarthritis of right knee 1/1/2021     Past Surgical History: Procedure Laterality Date    HX  SECTION      x 3    HX ORTHOPAEDIC Right 2020    total knee replacement    HX OTHER SURGICAL      removal of fatty tumor back x 2     HX TONSILLECTOMY       Barriers to Learning/Limitations: yes;  anesthesia  Compensate with: Visual Cues, Verbal Cues, and Tactile Cues  Home Situation:  Home Situation  Home Environment: Private residence  # Steps to Enter: 4  Rails to Enter: Yes  Hand Rails : Bilateral(wide)  One/Two Story Residence: Two story  # of Interior Steps: 12  Interior Rails: Right  Lift Chair Available: No  Living Alone: Yes  Support Systems: Child(wong)  Patient Expects to be Discharged to[de-identified] Private residence  Current DME Used/Available at Home: Candance March, straight, Commode, bedside, Walker, rolling  Tub or Shower Type: Shower(1/2 bath)  Critical Behavior:  Neurologic State: Alert  Orientation Level: Oriented to person;Oriented to place;Oriented to situation  Cognition: Follows commands  Safety/Judgement: Awareness of environment  Psychosocial  Patient Behaviors: Calm; Cooperative  Family  Behaviors: Supportive; Cooperative;Calm  Needs Expressed: Educational;Emotional  Purposeful Interaction: Yes  Pt Identified Daily Priority: Clinical issues (comment)  Aileens Process: Nurture loving kindness;Enable marjorie/hope;Establish trust;Nurture spiritual self;Teaching/learning; Attend basic human needs;Create healing environment  Caring Interventions: Reassure  Reassure: Therapeutic listening; Informing; Acceptance; Instilling marjorie and hope;Support family  Therapeutic Modalities: Deep breathing;Humor; Intentional therapeutic touch  Skin Condition/Temp: Dry;Warm  Family  Behaviors: Supportive; Cooperative;Calm  Skin Integrity: Incision (comment)(R knee)  Skin Integumentary  Skin Color: Appropriate for ethnicity  Skin Condition/Temp: Dry;Warm  Skin Integrity: Incision (comment)(R knee)  Strength:    Strength: Generally decreased, functional  Tone & Sensation:   Tone: Normal  Sensation: Impaired(R knee)  Range Of Motion:  AROM: Generally decreased, functional  PROM: Generally decreased, functional  Functional Mobility:  Bed Mobility:  Scooting: Stand-by assistance  Transfers:  Sit to Stand: Supervision  Stand to Sit: Supervision  Bed to Chair: Supervision; Adaptive equipment  Balance:   Sitting: Intact  Standing: Intact; With support  Ambulation/Gait Training:  Distance (ft): 130 Feet (ft)  Assistive Device: Walker, rolling;Gait belt  Ambulation - Level of Assistance: Contact guard assistance(vc)  Gait Abnormalities: Antalgic;Decreased step clearance; Step to gait  Right Side Weight Bearing: As tolerated  Base of Support: Shift to left  Stance: Right decreased  Speed/Mariaelena: Slow  Step Length: Left shortened;Right shortened  Swing Pattern: Left asymmetrical;Right asymmetrical  Interventions: Safety awareness training; Tactile cues; Verbal cues; Visual/Demos  Stairs:  Number of Stairs Trained: 5  Stairs - Level of Assistance: Contact guard assistance;Stand-by assistance(vc)  Rail Use: Both     Therapeutic Exercises:   Encouraged HEP  Pain:  Pain level pre-treatment: 0/10   Pain level post-treatment: 0/10   Pain Intervention(s) : Medication (see MAR); Rest, Ice, Repositioning  Response to intervention: Nurse notified, See doc flow    Activity Tolerance:   Fair   Please refer to the flowsheet for vital signs taken during this treatment. After treatment:   [x]         Patient left in no apparent distress sitting up in reclining chair  []         Patient left in no apparent distress in bed  [x]         Call bell left within reach  [x]         Nursing notified  [x]         Caregiver present  []         Bed alarm activated  []         SCDs applied    COMMUNICATION/EDUCATION:   [x]         Role of Physical Therapy in the acute care setting. [x]         Fall prevention education was provided and the patient/caregiver indicated understanding.   [x]         Patient/family have participated as able in goal setting and plan of care. [x]         Patient/family agree to work toward stated goals and plan of care. []         Patient understands intent and goals of therapy, but is neutral about his/her participation. []         Patient is unable to participate in goal setting/plan of care: ongoing with therapy staff.  []         Other:     Thank you for this referral.  Lester Landa, PT   Time Calculation: 28 mins      Eval Complexity: History: HIGH Complexity :3+ comorbidities / personal factors will impact the outcome/ POC Exam:MEDIUM Complexity : 3 Standardized tests and measures addressing body structure, function, activity limitation and / or participation in recreation  Presentation: LOW Complexity : Stable, uncomplicated  Clinical Decision Making:Low Complexity    Overall Complexity:LOW

## 2021-01-11 NOTE — ANESTHESIA PROCEDURE NOTES
Spinal Block    Start time: 1/11/2021 10:11 AM  End time: 1/11/2021 10:15 AM  Performed by: Maude Alvarez CRNA  Authorized by: Maude Alvarez CRNA     Pre-procedure: Indications: primary anesthetic  Preanesthetic Checklist: patient identified, risks and benefits discussed, anesthesia consent, site marked, patient being monitored and timeout performed    Timeout Time: 10:11          Spinal Block:   Patient Position:  Seated  Prep Region:  Lumbar  Prep: Betadine      Location:  T4-5  Technique:  Single shot    Local Dose (mL):  2    Needle:   Needle Type:   Byron  Needle Gauge:  25 G  Attempts:  1      Events: CSF confirmed and no blood with aspiration        Assessment:  Insertion:  Uncomplicated  Patient tolerance:  Patient tolerated the procedure well with no immediate complications  parathesia with entry resolved immediately

## 2021-01-12 ENCOUNTER — TELEPHONE (OUTPATIENT)
Dept: OTHER | Age: 76
End: 2021-01-12

## 2021-01-12 ENCOUNTER — HOME CARE VISIT (OUTPATIENT)
Dept: SCHEDULING | Facility: HOME HEALTH | Age: 76
End: 2021-01-12
Payer: MEDICARE

## 2021-01-12 ENCOUNTER — HOME CARE VISIT (OUTPATIENT)
Dept: HOME HEALTH SERVICES | Facility: HOME HEALTH | Age: 76
End: 2021-01-12

## 2021-01-12 VITALS
SYSTOLIC BLOOD PRESSURE: 160 MMHG | DIASTOLIC BLOOD PRESSURE: 91 MMHG | HEART RATE: 60 BPM | TEMPERATURE: 97.5 F | RESPIRATION RATE: 16 BRPM | OXYGEN SATURATION: 99 %

## 2021-01-12 VITALS
TEMPERATURE: 99.4 F | RESPIRATION RATE: 16 BRPM | SYSTOLIC BLOOD PRESSURE: 142 MMHG | OXYGEN SATURATION: 97 % | HEART RATE: 62 BPM | DIASTOLIC BLOOD PRESSURE: 80 MMHG

## 2021-01-12 PROCEDURE — 3331090002 HH PPS REVENUE DEBIT

## 2021-01-12 PROCEDURE — 400013 HH SOC

## 2021-01-12 PROCEDURE — 400018 HH-NO PAY CLAIM PROCEDURE

## 2021-01-12 PROCEDURE — G0151 HHCP-SERV OF PT,EA 15 MIN: HCPCS

## 2021-01-12 PROCEDURE — A6213 FOAM DRG >16<=48 SQ IN W/BDR: HCPCS

## 2021-01-12 PROCEDURE — 3331090001 HH PPS REVENUE CREDIT

## 2021-01-12 PROCEDURE — G0299 HHS/HOSPICE OF RN EA 15 MIN: HCPCS

## 2021-01-12 RX ORDER — LIDOCAINE HYDROCHLORIDE 10 MG/ML
INJECTION, SOLUTION EPIDURAL; INFILTRATION; INTRACAUDAL; PERINEURAL
Status: DISCONTINUED | OUTPATIENT
Start: 2021-01-11 | End: 2021-01-12 | Stop reason: HOSPADM

## 2021-01-12 NOTE — TELEPHONE ENCOUNTER
Jesu Alonso post knee revision follow up call. Home Health is coming today . Discussed using ice, distraction, and repositioning to manage pain besides using medication. Reminded patient not to get the wound wet and to cover it before bathing. Reminded patient the importance of doing exercises as shown. Reminded patient to change positions frequently and walking at least 3-4 times each day to promote circulation, decrease stiffness and soreness. Reinforced increased swelling, bruising and pain are normal after surgery when at home. Educated to lie down and raise leg while straight on pillows above heart level to help decrease swelling too. Reminded to healthy eat foods along with drinking plenty of fluids to promote healing. Reminded not  to take medication on an empty stomach to prevent nausea. Reminded to take a stool softener while taking a narcotic due to constipation being a side effect of anesthesia and narcotics. Taking medications as prescribed by provider. Jesu Alonso knows when their follow up appointment is.       Orthopaedic Navigator

## 2021-01-12 NOTE — ADDENDUM NOTE
Addendum  created 01/12/21 1021 by Bola Cheng CRNA    Clinical Note Signed, Diagnosis association updated, Intraprocedure Blocks edited, Orders acknowledged in Narrator

## 2021-01-13 ENCOUNTER — HOME CARE VISIT (OUTPATIENT)
Dept: HOME HEALTH SERVICES | Facility: HOME HEALTH | Age: 76
End: 2021-01-13
Payer: MEDICARE

## 2021-01-13 ENCOUNTER — HOME CARE VISIT (OUTPATIENT)
Dept: SCHEDULING | Facility: HOME HEALTH | Age: 76
End: 2021-01-13
Payer: MEDICARE

## 2021-01-13 VITALS
HEART RATE: 97 BPM | SYSTOLIC BLOOD PRESSURE: 130 MMHG | TEMPERATURE: 97.3 F | OXYGEN SATURATION: 98 % | DIASTOLIC BLOOD PRESSURE: 64 MMHG

## 2021-01-13 PROCEDURE — 3331090002 HH PPS REVENUE DEBIT

## 2021-01-13 PROCEDURE — 3331090001 HH PPS REVENUE CREDIT

## 2021-01-13 PROCEDURE — G0157 HHC PT ASSISTANT EA 15: HCPCS

## 2021-01-14 PROCEDURE — 3331090002 HH PPS REVENUE DEBIT

## 2021-01-14 PROCEDURE — 3331090001 HH PPS REVENUE CREDIT

## 2021-01-15 ENCOUNTER — HOME CARE VISIT (OUTPATIENT)
Dept: SCHEDULING | Facility: HOME HEALTH | Age: 76
End: 2021-01-15
Payer: MEDICARE

## 2021-01-15 PROCEDURE — G0157 HHC PT ASSISTANT EA 15: HCPCS

## 2021-01-15 PROCEDURE — 3331090001 HH PPS REVENUE CREDIT

## 2021-01-15 PROCEDURE — 3331090002 HH PPS REVENUE DEBIT

## 2021-01-16 VITALS
TEMPERATURE: 97.6 F | SYSTOLIC BLOOD PRESSURE: 136 MMHG | OXYGEN SATURATION: 96 % | HEART RATE: 96 BPM | DIASTOLIC BLOOD PRESSURE: 84 MMHG

## 2021-01-16 PROCEDURE — 3331090001 HH PPS REVENUE CREDIT

## 2021-01-16 PROCEDURE — 3331090002 HH PPS REVENUE DEBIT

## 2021-01-17 PROCEDURE — 3331090002 HH PPS REVENUE DEBIT

## 2021-01-17 PROCEDURE — 3331090001 HH PPS REVENUE CREDIT

## 2021-01-18 ENCOUNTER — HOME CARE VISIT (OUTPATIENT)
Dept: SCHEDULING | Facility: HOME HEALTH | Age: 76
End: 2021-01-18
Payer: MEDICARE

## 2021-01-18 VITALS
HEART RATE: 68 BPM | SYSTOLIC BLOOD PRESSURE: 138 MMHG | RESPIRATION RATE: 16 BRPM | OXYGEN SATURATION: 96 % | DIASTOLIC BLOOD PRESSURE: 80 MMHG | TEMPERATURE: 97.7 F

## 2021-01-18 VITALS
SYSTOLIC BLOOD PRESSURE: 138 MMHG | OXYGEN SATURATION: 94 % | TEMPERATURE: 96.8 F | HEART RATE: 90 BPM | DIASTOLIC BLOOD PRESSURE: 83 MMHG

## 2021-01-18 PROCEDURE — G0299 HHS/HOSPICE OF RN EA 15 MIN: HCPCS

## 2021-01-18 PROCEDURE — 3331090001 HH PPS REVENUE CREDIT

## 2021-01-18 PROCEDURE — G0157 HHC PT ASSISTANT EA 15: HCPCS

## 2021-01-18 PROCEDURE — 3331090002 HH PPS REVENUE DEBIT

## 2021-01-19 PROCEDURE — 3331090001 HH PPS REVENUE CREDIT

## 2021-01-19 PROCEDURE — 3331090002 HH PPS REVENUE DEBIT

## 2021-01-20 ENCOUNTER — HOME CARE VISIT (OUTPATIENT)
Dept: SCHEDULING | Facility: HOME HEALTH | Age: 76
End: 2021-01-20
Payer: MEDICARE

## 2021-01-20 VITALS
TEMPERATURE: 96.7 F | OXYGEN SATURATION: 98 % | HEART RATE: 93 BPM | SYSTOLIC BLOOD PRESSURE: 136 MMHG | DIASTOLIC BLOOD PRESSURE: 70 MMHG

## 2021-01-20 PROCEDURE — 3331090001 HH PPS REVENUE CREDIT

## 2021-01-20 PROCEDURE — 3331090002 HH PPS REVENUE DEBIT

## 2021-01-20 PROCEDURE — G0157 HHC PT ASSISTANT EA 15: HCPCS

## 2021-01-21 PROCEDURE — 3331090001 HH PPS REVENUE CREDIT

## 2021-01-21 PROCEDURE — 3331090002 HH PPS REVENUE DEBIT

## 2021-01-22 ENCOUNTER — HOME CARE VISIT (OUTPATIENT)
Dept: SCHEDULING | Facility: HOME HEALTH | Age: 76
End: 2021-01-22
Payer: MEDICARE

## 2021-01-22 VITALS
SYSTOLIC BLOOD PRESSURE: 126 MMHG | HEART RATE: 85 BPM | OXYGEN SATURATION: 97 % | TEMPERATURE: 96.9 F | DIASTOLIC BLOOD PRESSURE: 72 MMHG

## 2021-01-22 PROCEDURE — 3331090001 HH PPS REVENUE CREDIT

## 2021-01-22 PROCEDURE — G0157 HHC PT ASSISTANT EA 15: HCPCS

## 2021-01-22 PROCEDURE — 3331090002 HH PPS REVENUE DEBIT

## 2021-01-23 ENCOUNTER — HOME CARE VISIT (OUTPATIENT)
Dept: SCHEDULING | Facility: HOME HEALTH | Age: 76
End: 2021-01-23
Payer: MEDICARE

## 2021-01-23 PROCEDURE — 3331090002 HH PPS REVENUE DEBIT

## 2021-01-23 PROCEDURE — G0299 HHS/HOSPICE OF RN EA 15 MIN: HCPCS

## 2021-01-23 PROCEDURE — 3331090001 HH PPS REVENUE CREDIT

## 2021-01-24 PROCEDURE — 3331090002 HH PPS REVENUE DEBIT

## 2021-01-24 PROCEDURE — 3331090001 HH PPS REVENUE CREDIT

## 2021-01-25 VITALS
TEMPERATURE: 98.2 F | HEART RATE: 82 BPM | DIASTOLIC BLOOD PRESSURE: 74 MMHG | OXYGEN SATURATION: 98 % | RESPIRATION RATE: 18 BRPM | SYSTOLIC BLOOD PRESSURE: 128 MMHG

## 2021-01-25 PROCEDURE — 3331090001 HH PPS REVENUE CREDIT

## 2021-01-25 PROCEDURE — 3331090002 HH PPS REVENUE DEBIT

## 2021-01-26 ENCOUNTER — HOME CARE VISIT (OUTPATIENT)
Dept: SCHEDULING | Facility: HOME HEALTH | Age: 76
End: 2021-01-26
Payer: MEDICARE

## 2021-01-26 VITALS
HEART RATE: 89 BPM | DIASTOLIC BLOOD PRESSURE: 78 MMHG | SYSTOLIC BLOOD PRESSURE: 126 MMHG | OXYGEN SATURATION: 97 % | TEMPERATURE: 97.1 F

## 2021-01-26 PROCEDURE — 3331090002 HH PPS REVENUE DEBIT

## 2021-01-26 PROCEDURE — G0157 HHC PT ASSISTANT EA 15: HCPCS

## 2021-01-26 PROCEDURE — 3331090001 HH PPS REVENUE CREDIT

## 2021-01-27 PROCEDURE — 3331090002 HH PPS REVENUE DEBIT

## 2021-01-27 PROCEDURE — 3331090001 HH PPS REVENUE CREDIT

## 2021-01-28 ENCOUNTER — HOME CARE VISIT (OUTPATIENT)
Dept: SCHEDULING | Facility: HOME HEALTH | Age: 76
End: 2021-01-28
Payer: MEDICARE

## 2021-01-28 VITALS
DIASTOLIC BLOOD PRESSURE: 85 MMHG | OXYGEN SATURATION: 96 % | HEART RATE: 100 BPM | TEMPERATURE: 96.4 F | RESPIRATION RATE: 16 BRPM | SYSTOLIC BLOOD PRESSURE: 159 MMHG

## 2021-01-28 PROCEDURE — 3331090001 HH PPS REVENUE CREDIT

## 2021-01-28 PROCEDURE — 3331090002 HH PPS REVENUE DEBIT

## 2021-01-28 PROCEDURE — G0151 HHCP-SERV OF PT,EA 15 MIN: HCPCS

## 2021-02-04 ENCOUNTER — HOSPITAL ENCOUNTER (OUTPATIENT)
Dept: PHYSICAL THERAPY | Age: 76
Discharge: HOME | End: 2021-02-04
Payer: MEDICARE

## 2021-02-04 PROCEDURE — 97161 PT EVAL LOW COMPLEX 20 MIN: CPT

## 2021-02-04 PROCEDURE — 97016 VASOPNEUMATIC DEVICE THERAPY: CPT

## 2021-02-04 PROCEDURE — 97110 THERAPEUTIC EXERCISES: CPT

## 2021-02-04 NOTE — PROGRESS NOTES
[facility] at the [address] [place], [address] [facility]  Phone: [phone]      Fax:  [phone]       Plan of Care/ Statement of Necessity for Physical Therapy Services      Patient name: Onelia Nava Start of Care: 2021   Referral source: Rachel Bautista MD : 1945    Medical Diagnosis: Right knee pain [M25.561]   Onset Date: right knee pain    Treatment Diagnosis: right knee pain   Prior Hospitalization: see medical history Provider#: [id number]   Medications: Verified on Patient summary List    Comorbidities: diabetes, HTN, OA. Prior Level of Function: walking 5 miles 3x/wk, aerobics class      The Plan of Care and following information is based on the information from the initial evaluation. Assessment/ key information:   Patient presents s/p right TKA 2021 with deficits in right knee AROM, decreased strength, altered gait pattern, moderate pain and edema and decreased functional activity tolerance.     Patient will continue to benefit from skilled PT services to modify and progress therapeutic interventions, address functional mobility deficits, address ROM deficits, address strength deficits, analyze and address soft tissue restrictions, analyze and cue movement patterns, analyze and modify body mechanics/ergonomics and assess and modify postural abnormalities to attain remaining goals.     Evaluation Complexity History MEDIUM  Complexity : 1-2 comorbidities / personal factors will impact the outcome/ POC ; Examination LOW Complexity : 1-2 Standardized tests and measures addressing body structure, function, activity limitation and / or participation in recreation  ;Presentation LOW Complexity : Stable, uncomplicated  ;Clinical Decision Making MEDIUM Complexity : FOTO score of 26-74  Overall Complexity Rating: LOW   Problem List: pain affecting function, decrease ROM, decrease strength, edema affecting function, impaired gait/ balance, decrease ADL/ functional abilitiies, decrease activity tolerance, decrease flexibility/ joint mobility and decrease transfer abilities   Treatment Plan may include any combination of the following: Therapeutic exercise, Therapeutic activities, Neuromuscular re-education, Physical agent/modality, Gait/balance training, Manual therapy, Patient education, Self Care training, Functional mobility training, Home safety training and Stair training  Patient / Family readiness to learn indicated by: asking questions, trying to perform skills and interest  Persons(s) to be included in education: patient (P)  Barriers to Learning/Limitations: None  Patient Goal (s): I want to eventually get back to my long walks and my exercise classes.   Patient Self Reported Health Status: good  Rehabilitation Potential: good    Short Term Goals: To be accomplished in 4 weeks:   Patient will report compliance with HEP at least 1x/day to aid in rehabilitation program.   Status at IE: Patient instructed in and provided written copy of initial Home Exercise Program.   Current: Same as IE     Patient will demonstrate right knee AROM of 0 to 120 degrees to aid in completion of ADLs. Status at IE: right knee AROM 5 to 102 degrees. Current: Same as IE       Long Term Goals: To be accomplished in 8 weeks:   Patient will increase strength to 5/5 throughout B LEs to aid in completion of ADLs. Status at IE: right knee ext 3+, flex 4-   Current: Same as IE     Patient will report pain less than 1-2/10 average to aid in completion of ADLs. Status at IE: 5/10   Current: Same as IE     Patient will perform 5 or more pain free squats with good form/technique to aid in completion of ADLs. Status at IE: unable to perform one squat   Current: Same as IE     Patient will demonstrate normal gait pattern on level and stairs without device for 1000 ft.  .   Status at IE: antalgic gait with straight cane <200 ft, ambulates stairs one step at a time.   Current: Same as IE     Patient will improve FOTO (an established functional score where 100 = no disability) to 71 points overall to demonstrate improvement in functional ability. Status at IE:54   Current: Same as IE      Frequency / Duration: Patient to be seen 3 times per week for 4 weeks then 2 times per week for 4 weeks. Patient/ Caregiver education and instruction: Diagnosis, prognosis, self care, activity modification and exercises   [x]  Plan of care has been reviewed with PTA    Certification Period: 2/4/2021 to 5/1/2021  Janie Garrido, PT 2/4/2021 3:54 PM  _____________________________________________________________________  I certify that the above Therapy Services are being furnished while the patient is under my care. I agree with the treatment plan and certify that this therapy is necessary.     Physician's Signature:____________Date:_________TIME:________    [employer], Date and Time must be completed for valid certification **    Please sign and return to [facility] at the [address], [address] [facility]       Phone: [phone]      Fax:  [phone]

## 2021-02-04 NOTE — PROGRESS NOTES
PT DAILY TREATMENT NOTE/KNEE EVAL 10-18    Patient Name: Stanley Bills  Date:2021  : 1945  [x]  Patient  Verified  Payor: [place] MED[place]ARE / Plan: [place] MEDICARE PART A & B / Product Type: Medicare /    In time:230  Out time:329  Total Treatment Time (min): 35  Visit #: 1 of 24    Medicare/BCBS Only   Total Timed Codes (min):  25 1:1 Treatment Time:  25     Treatment Area: Right knee pain [M25.561]    SUBJECTIVE  Pain Level (0-10 scale): 4-5  []constant []intermittent [x]improving []worsening []no change since onset    Any medication changes, allergies to medications, adverse drug reactions, diagnosis change, or new procedure performed?: [x] No    [] Yes (see summary sheet for update)  Subjective functional status/changes:     Patient has c/c of right knee pain since right TKA performed 2021. Patient describes pain as anterior right knee aching and pressure. Pain is worse in PM. Denies numbness/tingling. Denies popping/clicking. Aggravating factors: weight bearing activities. Alleviating factors: rest, ice, elevation. Denies red flags: SOB, chest pain, dizziness/lightheadedness, blurred/double vision, HA, chills/fevers, night sweats, change in bowel/bladder control, abdominal pain, difficulty swallowing, slurred speech, unexplained weight gain/loss, nausea, vomiting. PMHx: diabetes, HTN, OA. Surgical Hx: right knee arthroscopy. Social Hx: , two level home, no alcohol, no tobacco, retired. PLOF: walking 5 miles 3x/wk, aerobics class. CLOF: household ambulation with SPC. Diagnostic Imaging: post op good alignment.     OBJECTIVE/EXAMINATION    Modality rationale: decrease edema and decrease pain to improve the patients ability to complete ADLs   Type Additional Details   [x]  Vasopneumatic Device Pressure:       [] lo [x] med [] hi   Temperature: [x] lo [] med [] hi   [x] Skin assessment post-treatment:  [x]intact []redness- no adverse reaction    []redness  adverse reaction:     24 min [x]Eval []Re-Eval       25 min Therapeutic Exercise:  [x] See flow sheet :   Rationale: increase ROM, increase strength and decrease pain to improve the patients ability to complete ADLs        With   [] TE   [] TA   [] neuro   [] other: Patient Education: [x] Review HEP    [] Progressed/Changed HEP based on:   [] positioning   [] body mechanics   [] transfers   [] heat/ice application    [] other:        Physical Therapy Evaluation - Knee    Posture: [] Varus    [] Valgus    [] Recurvatum        [] Tibial Torsion    [] Foot Supination    [] Foot Pronation    Describe: normal alignment    Gait:  [] Normal    [] Abnormal    [x] Antalgic    [] NWB    Device: SPC    Describe: decreased stance time right LE, with decreased knee flexion in swing phase, ambulates stairs one step at a time.     ROM / Strength  [] Unable to assess                  AROM              Strength (1-5)    Left Right Left Right   Knee Flexion +133 +102 5 4-    Extension -3 +5 5 3+       Palpation: Moderate tenderness to palpation medial and lateral right knee   Neg/Pos  Neg/Pos  Neg/Pos   Joint Line  Quad tendon  Patellar ligament    Patella  Fibular head  Pes Anserinus    Tibial tubercle  Hamstring tendons  Infrapatellar fat pad      Optional Tests:  Patellar Mobility   []L []R Hypermobile [x]L []R Hypomobile         Girth Measurements:     Cm         Mid-patella   Left  50.4   Right   44.6     Lachmans  [] Neg    [] Pos Posterior Drawer [x] Neg    [] Pos  Pivot Shift  [] Neg    [] Pos Posterior Sag  [x] Neg    [] Pos  LEANDRA   [] Neg    [] Pos   ALRI   [] Neg    [] Pos Squat   [] Neg    [x] Pos  Valgus@ 0 Degrees [x] Neg    [] Pos Daniele-Celestino [] Neg    [] Pos  Valgus@ 30 Degrees [x] Neg    [] Pos Patellar Apprehension [] Neg    [] Pos  Varus@ 0 Degrees [x] Neg    [] Pos Kwon's Compression [] Neg    [] Pos  Varus@ 30 Degrees [x] Neg    [] Pos Ely's Test  [] Neg    [] Pos  Apley's Compression [] Neg    [] Pos Herrera's Test  [] Neg    [] Pos  Apley's Distraction [] Neg    [] Pos Stroke Test  [] Neg    [] Pos   Anterior Drawer [x] Neg    [] Pos Fluctuation Test [] Neg    [] Pos  Thessaly's Test:                  [] Neg    [] Pos     Lever Sign:              Other tests/comments:       Pain Level (0-10 scale) post treatment: 4    ASSESSMENT/Changes in Function: Patient presents s/p right TKA 1/11/2021 with deficits in right knee AROM, decreased strength, altered gait pattern, moderate pain and edema and decreased functional activity tolerance. Patient will continue to benefit from skilled PT services to modify and progress therapeutic interventions, address functional mobility deficits, address ROM deficits, address strength deficits, analyze and address soft tissue restrictions, analyze and cue movement patterns, analyze and modify body mechanics/ergonomics and assess and modify postural abnormalities to attain remaining goals.      [x]  See Plan of Care  []  See progress note/recertification  []  See Discharge Summary         Progress towards goals / Updated goals:  See POC    PLAN  []  Upgrade activities as tolerated     [x]  Continue plan of care  []  Update interventions per flow sheet       []  Discharge due to:_  []  Other:_      Robby Campbell, PT 2/4/2021  2:35 PM

## 2021-02-09 ENCOUNTER — HOSPITAL ENCOUNTER (OUTPATIENT)
Dept: PHYSICAL THERAPY | Age: 76
Discharge: HOME | End: 2021-02-09
Payer: MEDICARE

## 2021-02-09 PROCEDURE — 97016 VASOPNEUMATIC DEVICE THERAPY: CPT | Performed by: PHYSICAL THERAPIST

## 2021-02-09 PROCEDURE — 97140 MANUAL THERAPY 1/> REGIONS: CPT | Performed by: PHYSICAL THERAPIST

## 2021-02-09 PROCEDURE — 97110 THERAPEUTIC EXERCISES: CPT | Performed by: PHYSICAL THERAPIST

## 2021-02-09 NOTE — PROGRESS NOTES
PT DAILY TREATMENT NOTE    Patient Name: Emmanuel Bauer  Date:2021  : 1945  [x]  Patient  Verified  Payor: [place] MED[place]ARE / Plan: [place] MEDICARE PART A & B / Product Type: Medicare /    In time:12:57  Out time:1:52  Total Treatment Time (min): 55  Total Timed Codes (min): 45  1:1 Treatment Time ([facility] only): 39   Visit #: 2 of 20    Treatment Area: Right knee pain [M25.561]    SUBJECTIVE  Pain Level (0-10 scale): 5  Any medication changes, allergies to medications, adverse drug reactions, diagnosis change, or new procedure performed?: [x] No    [] Yes (see summary sheet for update)  Subjective functional status/changes:   [] No changes reported  Feels okay. Just stiff. No new issues. OBJECTIVE    Modalities Rationale:     decrease edema and decrease pain to improve patient's ability to Complete ADLS  10 min [x]  Vasopneumatic Device, press/temp: Right knee , mod/low    min []  Other:    [] Skin assessment post-treatment (if applicable):    []  intact    []  redness- no adverse reaction     []redness  adverse reaction:        8 min Manual Therapy:  Passive stretching, PROM   The manual therapy interventions were performed at a separate and distinct time from the therapeutic activities interventions. Rationale: decrease pain, increase ROM and increase tissue extensibility to complete ADLs    37 min Therapeutic Exercise:  [x] See flow sheet :   Rationale: increase ROM, increase strength and decrease pain to improve the patients ability to complete ADLs         With   [] TE   [] TA   [] neuro   [] other: Patient Education: [x] Review HEP    [] Progressed/Changed HEP based on:   [] positioning   [] body mechanics   [] transfers   [] heat/ice application    [] other:      Other Objective/Functional Measures: NA     Pain Level (0-10 scale) post treatment: 5    ASSESSMENT/Changes in Function: Patient responds well to treatment session. Patient is progressing well. Minimal difficulty with exercises.  . No adverse effects were noted from today's treatment session       Patient will continue to benefit from skilled PT services to modify and progress therapeutic interventions, address functional mobility deficits, address ROM deficits, address strength deficits, analyze and address soft tissue restrictions, analyze and cue movement patterns, analyze and modify body mechanics/ergonomics, assess and modify postural abnormalities    []  See Plan of Care  []  See progress note/recertification  []  See Discharge Summary         Progress towards goals / Updated goals:  Short Term Goals: To be accomplished in 4 weeks:                   Patient will report compliance with HEP at least 1x/day to aid in rehabilitation program.                   Status at IE: Patient instructed in and provided written copy of initial Home Exercise Program.                   Current: Same as IE                      Patient will demonstrate right knee AROM of 0 to 120 degrees to aid in completion of ADLs. Status at IE: right knee AROM 5 to 102 degrees. Current: Same as IE        Long Term Goals: To be accomplished in 8 weeks:                   Patient will increase strength to 5/5 throughout B LEs to aid in completion of ADLs. Status at IE: right knee ext 3+, flex 4-                   Current: Same as IE                      Patient will report pain less than 1-2/10 average to aid in completion of ADLs. Status at IE: 5/10                   Current: Same as IE                      Patient will perform 5 or more pain free squats with good form/technique to aid in completion of ADLs. Status at IE: unable to perform one squat                   Current: Same as IE                      Patient will demonstrate normal gait pattern on level and stairs without device for 1000 ft.  .                   Status at IE: antalgic gait with straight cane <200 ft, ambulates stairs one step at a time. Current: Same as IE                      Patient will improve FOTO (an established functional score where 100 = no disability) to 71 points overall to demonstrate improvement in functional ability.                    Status at IE:54                   Current: Same as IE       PLAN  []  Upgrade activities as tolerated     [x]  Continue plan of care  []  Update interventions per flow sheet       []  Discharge due to:_  []  Other:_      Natalia Arriola, PT, DPT 2/9/2021  12:56 PM    Future Appointments   Date Time Provider Leo Garcia   2/9/2021  1:00 PM [facility] PT PRINCE [facility]   2/10/2021 11:00 AM Lissette Wilson PT [facility]   2/16/2021  1:00 PM [facility] PT PRINCE [facility]   2/17/2021 10:15 AM Laura Simon PT [facility]   2/18/2021 11:45 AM Laura Simon PT [facility]   2/22/2021  3:15 PM Canalouruss Pratt, PT [facility]   2/24/2021 10:15 AM Canalou Santa PT [facility]   2/25/2021  3:15 PM Laura Simon PT [facility]

## 2021-02-10 ENCOUNTER — HOSPITAL ENCOUNTER (OUTPATIENT)
Dept: PHYSICAL THERAPY | Age: 76
Discharge: HOME | End: 2021-02-10
Payer: MEDICARE

## 2021-02-10 PROCEDURE — 97140 MANUAL THERAPY 1/> REGIONS: CPT

## 2021-02-10 PROCEDURE — 97110 THERAPEUTIC EXERCISES: CPT

## 2021-02-10 PROCEDURE — 97016 VASOPNEUMATIC DEVICE THERAPY: CPT

## 2021-02-10 NOTE — PROGRESS NOTES
PT DAILY TREATMENT NOTE    Patient Name: Padmini Guevara  Date:2/10/2021  : 1945  [x]  Patient  Verified  Payor: [place] MEDICARE / Plan: [place] MEDICARE PART A & B / Product Type: Medicare /    In time:   Out time:   Total Treatment Time (min): 59  Total Timed Codes (min): 49  1:1 Treatment Time ([facility] only): 40   Visit #: 3 of 20    Treatment Area: Right knee pain [M25.561]    SUBJECTIVE  Pain Level (0-10 scale): 5  Any medication changes, allergies to medications, adverse drug reactions, diagnosis change, or new procedure performed?: [x] No    [] Yes (see summary sheet for update)  Subjective functional status/changes:   [] No changes reported  \"I still have quite a bit of pain. But, I can now walk some without the cane and I can tell my knee is bending better. \"    OBJECTIVE    Modalities Rationale:     decrease edema and decrease pain to improve patient's ability to Complete ADLS         10 min [x]? Vasopneumatic Device, press/temp: Right knee , mod/low     min []? Other:     [x]? Skin assessment post-treatment (if applicable):    [x]? intact    []? redness- no adverse reaction     []? redness  adverse reaction:         8 min Manual Therapy:  Passive stretching, PROM, soft tissue mobilization. The manual therapy interventions were performed at a separate and distinct time from the therapeutic activities interventions.   Rationale: decrease pain, increase ROM and increase tissue extensibility to complete ADLs       36 min Therapeutic Exercise:  [x] See flow sheet :   Rationale: increase ROM, increase strength and decrease pain to improve the patients ability to complete ADLs     With   [] TE   [] TA   [] neuro   [] other: Patient Education: [x] Review HEP    [] Progressed/Changed HEP based on:   [] positioning   [] body mechanics   [] transfers   [] heat/ice application    [] other:      Other Objective/Functional Measures: NA     Pain Level (0-10 scale) post treatment: 3    ASSESSMENT/Changes in Function: Patient gait pattern improving with more right knee flexion in swing phase noted. Patient responds well to treatment session. No adverse effects were noted from today's treatment session. Patient will continue to benefit from skilled PT services to modify and progress therapeutic interventions, address functional mobility deficits, address ROM deficits, address strength deficits, analyze and address soft tissue restrictions, analyze and cue movement patterns, analyze and modify body mechanics/ergonomics, assess and modify postural abnormalities,  and instruct in home and community integration to attain remaining goals. []  See Plan of Care  []  See progress note/recertification  []  See Discharge Summary         Progress towards goals / Updated goals:  Short Term Goals: To be accomplished in 4 weeks:                   AYRZRQI will report compliance with HEP at least 1x/day to aid in rehabilitation program.                   Status at IE: Patient instructed in and provided written copy of initial Home Exercise Program.                   Current: Patient reports good consistency with HEP two or more times per day. 2/10/2021                      Patient will demonstrate right knee AROM of 0 to 120 degrees to aid in completion of ADLs.                  Status at IE: right knee AROM 5 to 102 degrees.                  Current: AROM 4 to 105, PROM with overpressure 3 to 110 degrees. 2/10/2021        Long Term Goals: To be accomplished in 8 weeks:                   Patient will increase strength to 5/5 throughout B LEs to aid in completion of ADLs.                  Status at IE: right knee ext 3+, flex 4-                   Current: Same as IE                      Patient will report pain less than 1-2/10 average to aid in completion of ADLs.                    Status at IE: 5/10                   Current: Same as IE                      Patient will perform 5 or more pain free squats with good form/technique to aid in completion of ADLs.                  Status at IE: unable to perform one squat                   Current: Same as IE                      Patient will demonstrate normal gait pattern on level and stairs without device for 1000 ft. .                   Status at IE: antalgic gait with straight cane <200 ft, ambulates stairs one step at a time.                   Current: Same as IE                      Patient will improve FOTO (an established functional score where 100 = no disability) to 71 points overall to demonstrate improvement in functional ability.                    Status at IE:54                   Current: Same as IE    PLAN  []  Upgrade activities as tolerated     [x]  Continue plan of care  []  Update interventions per flow sheet       []  Discharge due to:_  []  Other:_      Cecilio Varela PT, DPT 2/10/2021  11:07 AM    Future Appointments   Date Time Provider Leo Garcia   2/16/2021  1:00 PM [facility] [place]NI [place] [facility] [facility]   2/17/2021 10:15 AM Ashely Simon PT [facility]   2/18/2021 11:45 AM Ashely Simon PT [facility]   2/22/2021  3:15 PM JOVANNI Higuera [facility] [facility]   2/24/2021 10:15 AM JOVANNI Higuera [facility]   2/25/2021  3:15 PM Ashely Simon PT [facility]

## 2021-02-16 ENCOUNTER — HOSPITAL ENCOUNTER (OUTPATIENT)
Dept: PHYSICAL THERAPY | Age: 76
Discharge: HOME | End: 2021-02-16
Payer: MEDICARE

## 2021-02-16 PROCEDURE — 97110 THERAPEUTIC EXERCISES: CPT | Performed by: PHYSICAL THERAPIST

## 2021-02-16 PROCEDURE — 97016 VASOPNEUMATIC DEVICE THERAPY: CPT | Performed by: PHYSICAL THERAPIST

## 2021-02-16 PROCEDURE — 97140 MANUAL THERAPY 1/> REGIONS: CPT | Performed by: PHYSICAL THERAPIST

## 2021-02-16 NOTE — PROGRESS NOTES
PT DAILY TREATMENT NOTE    Patient Name: Kurtis Moreno  Date:2021  : 1945  [x]  Patient  Verified  Payor: [place] MEDICARE / Plan: [place] MEDICARE PART A & B / Product Type: Medicare /    In time:1253  Out time:1349  Total Treatment Time (min): 55  Total Timed Codes (min): 45  1:1 Treatment Time ( [address] only): 39   Visit #: 4 of 20    Treatment Area: Right knee pain [M25.561]    SUBJECTIVE  Pain Level (0-10 scale): 0  Any medication changes, allergies to medications, adverse drug reactions, diagnosis change, or new procedure performed?: [x] No    [] Yes (see summary sheet for update)  Subjective functional status/changes:   [] No changes reported  Feels good. No new issues. OBJECTIVE    Modalities Rationale:     decrease edema and decrease pain to improve patient's ability to Complete ADLS  10 min [x]  Vasopneumatic Device, press/temp: Mod/low; right knee supine    min []  Other:    [] Skin assessment post-treatment (if applicable):    []  intact    []  redness- no adverse reaction     []redness  adverse reaction:        37 min Therapeutic Exercise:  [x] See flow sheet :   Rationale: increase ROM, increase strength and decrease pain to improve the patients ability to complete ADLs          8 min Manual Therapy:  Passive stretching, PROM   The manual therapy interventions were performed at a separate and distinct time from the therapeutic activities interventions. Rationale: decrease pain, increase ROM and increase tissue extensibility to complete ADLs    With   [] TE   [] TA   [] neuro   [] other: Patient Education: [x] Review HEP    [] Progressed/Changed HEP based on:   [] positioning   [] body mechanics   [] transfers   [] heat/ice application    [] other:      Other Objective/Functional Measures: NA     Pain Level (0-10 scale) post treatment: 3    ASSESSMENT/Changes in Function: Patient responds well to treatment session. Patient is progressing well.  Continues to have pain/stiffness and guarding with PROM. No adverse effects were noted from today's treatment session. Patient will continue to benefit from skilled PT services to modify and progress therapeutic interventions, address functional mobility deficits, address ROM deficits, address strength deficits, analyze and address soft tissue restrictions, analyze and cue movement patterns, analyze and modify body mechanics/ergonomics, assess and modify postural abnormalities, address imbalance/dizziness and instruct in home and community integration to attain remaining goals      []  See Plan of Care  []  See progress note/recertification  []  See Discharge Summary         Progress towards goals / Updated goals:  Short Term Goals: To be accomplished in 4 weeks:                   Patient will report compliance with HEP at least 1x/day to aid in rehabilitation program.                   Status at IE: Patient instructed in and provided written copy of initial Home Exercise Program.                   Current: Patient reports good consistency with HEP two or more times per day. 2/10/2021                      Patient will demonstrate right knee AROM of 0 to 120 degrees to aid in completion of ADLs.                  Status at IE: right knee AROM 5 to 102 degrees.                  Current: AROM 4 to 105, PROM with overpressure 3 to 110 degrees. 2/10/2021        Long Term Goals: To be accomplished in 8 weeks:                   Patient will increase strength to 5/5 throughout B LEs to aid in completion of ADLs.                  Status at IE: right knee ext 3+, flex 4-                   Current: Same as IE                      Patient will report pain less than 1-2/10 average to aid in completion of ADLs.                  Status at IE: 5/10                   Current: 0-5/10, progressing well. 2/16/2021                      Patient will perform 5 or more pain free squats with good form/technique to aid in completion of ADLs.                    Status at IE: unable to perform one squat                   Current: Same as IE                      Patient will demonstrate normal gait pattern on level and stairs without device for 1000 ft. .                   Status at IE: antalgic gait with straight cane <200 ft, ambulates stairs one step at a time.                   Current: Same as IE                      Patient will improve FOTO (an established functional score where 100 = no disability) to 71 points overall to demonstrate improvement in functional ability.                    Status at IE:54                   Current: Same as IE    PLAN  []  Upgrade activities as tolerated     [x]  Continue plan of care  []  Update interventions per flow sheet       []  Discharge due to:_  []  Other:_      Julia Johnson, PT, DPT 2/16/2021  12:56 PM    Future Appointments   Date Time Provider Leo Garcia   2/16/2021  [facility]   2/17/2021  9:30 AM Krystal Simon PT [facility]   2/22/2021  3:15 PM JOVANNI AugusteHPSURY [facility]   2/24/2021 10:15 AM JOVANNI AugusteHPSURY THE [facility]   2/25/2021  3:15 PM JOVANNI Auguste THE [facility]

## 2021-02-17 ENCOUNTER — HOSPITAL ENCOUNTER (OUTPATIENT)
Dept: PHYSICAL THERAPY | Age: 76
Discharge: HOME | End: 2021-02-17
Payer: MEDICARE

## 2021-02-17 PROCEDURE — 97140 MANUAL THERAPY 1/> REGIONS: CPT

## 2021-02-17 PROCEDURE — 97016 VASOPNEUMATIC DEVICE THERAPY: CPT

## 2021-02-17 PROCEDURE — 97110 THERAPEUTIC EXERCISES: CPT

## 2021-02-17 NOTE — PROGRESS NOTES
PT DAILY TREATMENT NOTE    Patient Name: Pedrito Howard  Date:2021  : 1945  [x]  Patient  Verified  Payor: Leann Medici / Plan: [place] MEDICARE PART A & B / Product Type: Medicare /    In time: 973  Out time: 1028  Total Treatment Time (min): 63  Total Timed Codes (min): 53  1:1 Treatment Time ( only): 48   Visit #: 5 of 20    Treatment Area: Right knee pain [M25.561]    SUBJECTIVE  Pain Level (0-10 scale): 0 at rest  Any medication changes, allergies to medications, adverse drug reactions, diagnosis change, or new procedure performed?: [x] No    [] Yes (see summary sheet for update)  Subjective functional status/changes:   [] No changes reported  \"I am feeling good. I can walk better now without the cane around the house and I don't have pain at rest anymore. \"    OBJECTIVE  Modalities Rationale:     decrease edema and decrease pain to improve patient's ability to Complete ADLS         10 min [x]? Vasopneumatic Device, press/temp: Mod/low; right knee supine     min []? Other:     []? Skin assessment post-treatment (if applicable):    []?  intact    []? redness- no adverse reaction     []? redness  adverse reaction:         40 min Therapeutic Exercise:  [x]? See flow sheet :   Rationale: increase ROM, increase strength and decrease pain to improve the patients ability to complete ADLs                     8 min Manual Therapy:  Passive stretching, PROM   The manual therapy interventions were performed at a separate and distinct time from the therapeutic activities interventions.   Rationale: decrease pain, increase ROM and increase tissue extensibility to complete ADLs     With   [] TE   [] TA   [] neuro   [] other: Patient Education: [x] Review HEP    [] Progressed/Changed HEP based on:   [] positioning   [] body mechanics   [] transfers   [] heat/ice application    [] other:      Other Objective/Functional Measures: NA     Pain Level (0-10 scale) post treatment: 0    ASSESSMENT/Changes in Function: Patient now with no pain at rest and lower pain intensity with activity and is tolerating steady progression of exercise intensity and addition of functional activities well. Patient responds well to treatment session. No adverse effects were noted from today's treatment session. Patient will continue to benefit from skilled PT services to modify and progress therapeutic interventions, address functional mobility deficits, address ROM deficits, address strength deficits, analyze and address soft tissue restrictions, analyze and cue movement patterns, analyze and modify body mechanics/ergonomics, assess and modify postural abnormalities,  and instruct in home and community integration to attain remaining goals. []  See Plan of Care  []  See progress note/recertification  []  See Discharge Summary         Progress towards goals / Updated goals:  Short Term Goals: To be accomplished in 4 weeks:                   RZUDSVQ will report compliance with HEP at least 1x/day to aid in rehabilitation program.                   Status at IE: Patient instructed in and provided written copy of initial Home Exercise Program.                   Current: Patient reports good consistency with HEP two or more times per day.   2/10/2021                      Patient will demonstrate right knee AROM of 0 to 120 degrees to aid in completion of ADLs.                  Status at IE: right knee AROM 5 to 102 degrees.                  GNTCHRC: CUKA 0 , PROM with overpressure 3 to 113 degrees.    2/17/2021        Long Term Goals: To be accomplished in 8 weeks:                   Patient will increase strength to 5/5 throughout B LEs to aid in completion of ADLs.                  Status at IE: right knee ext 3+, flex 4-                   Current: Same as IE                      Patient will report pain less than 1-2/10 average to aid in completion of ADLs.                    Status at IE: 5/10                   Current: 0-5/10, progressing well. 2/16/2021                      Patient will perform 5 or more pain free squats with good form/technique to aid in completion of ADLs.                  Status at IE: unable to perform one squat                   Current: Same as IE                      Patient will demonstrate normal gait pattern on level and stairs without device for 1000 ft. .                   Status at IE: antalgic gait with straight cane <200 ft, ambulates stairs one step at a time.                   Current: Same as IE                      Patient will improve FOTO (an established functional score where 100 = no disability) to 71 points overall to demonstrate improvement in functional ability.                    Status at IE:54                   Current: Same as IE    PLAN  []  Upgrade activities as tolerated     [x]  Continue plan of care  []  Update interventions per flow sheet       []  Discharge due to:_  []  Other:_      Isadora Wei PT, DPT 2/17/2021  9:51 AM    Future Appointments   Date Time Provider Leo Garcia   2/22/2021  3:15 PM JOVANNI Hobbs [facility]   2/24/2021 10:15 AM JOVANNI Hobbs THE [facility]

## 2021-02-18 ENCOUNTER — APPOINTMENT (OUTPATIENT)
Dept: PHYSICAL THERAPY | Age: 76
End: 2021-02-18
Payer: MEDICARE

## 2021-02-22 ENCOUNTER — HOSPITAL ENCOUNTER (OUTPATIENT)
Dept: PHYSICAL THERAPY | Age: 76
Discharge: HOME | End: 2021-02-22
Payer: MEDICARE

## 2021-02-22 PROCEDURE — 97016 VASOPNEUMATIC DEVICE THERAPY: CPT

## 2021-02-22 PROCEDURE — 97140 MANUAL THERAPY 1/> REGIONS: CPT

## 2021-02-22 PROCEDURE — 97110 THERAPEUTIC EXERCISES: CPT

## 2021-02-22 NOTE — PROGRESS NOTES
PT DAILY TREATMENT NOTE    Patient Name: James Wang  Date:2021  : 1945  [x]  Patient  Verified  Payor: Dwayne Risk / Plan: [place] & B / Product Type: Medicare /    In time:3:10  Out time:4:12  Total Treatment Time (min): 52  Total Timed Codes (min): 52  1:1 Treatment Time ( [address] only): 46   Visit #: 6 of 20    Treatment Area: Right knee pain [M25.561]    SUBJECTIVE  Pain Level (0-10 scale): 3/10 stiffness in the right knee  Any medication changes, allergies to medications, adverse drug reactions, diagnosis change, or new procedure performed?: [x] No    [] Yes (see summary sheet for update)  Subjective functional status/changes:   [] No changes reported  Pt reports when she first gets up in the morning she tries to do her heel slides to warm her knee up. Pt reports after a little it gets cold and gets tight again. Pt reports she has difficulty with anything where she has to bend her knee. OBJECTIVE    Modalities Rationale:     decrease edema, decrease inflammation and decrease pain to improve patient's ability to To increase patient's ease with performing functional activities and decrease overall pain and symptoms. min [] Estim, type/location:                                      []  att     []  unatt     []  w/US     []  w/ice    []  w/heat    min []  Mechanical Traction: type/lbs                   []  pro   []  sup   []  int   []  cont    []  before manual    []  after manual    min []  Ultrasound, settings/location:      min []  Iontophoresis w/ dexamethasone, location:                                               []  take home patch       []  in clinic    min []  Ice     []  Heat    location/position:    10 min [x]  Vasopneumatic Device, press/temp: Pt sitting, low pressure with right LE elevated.     min []  Other:    [] Skin assessment post-treatment (if applicable):    []  intact    []  redness- no adverse reaction     []redness  adverse reaction:        32 min Therapeutic Exercise:  [] See flow sheet :   Rationale: increase ROM, increase strength and increase proprioception to improve the patients ability to increase patient's ease with performing functional activities and decrease overall pain and symptoms. 10 min Manual Therapy:  Patellar mobilizations in all directions. Retrograde massage to decrease swelling. Scar massage to right scar. Grade III A/P and P/A TFJ mobilizations to increase knee ROM. Rationale: decrease pain, increase ROM and increase tissue extensibility to improve the patients ability to perform functional activities with decreased pain. The manual therapy interventions were performed at a separate and distinct time from the therapeutic activities interventions. With   [] TE   [] TA   [] neuro   [] other: Patient Education: [x] Review HEP    [] Progressed/Changed HEP based on:   [] positioning   [] body mechanics   [] transfers   [] heat/ice application    [] other:      Other Objective/Functional Measures: ROM as indicated below     Pain Level (0-10 scale) post treatment: 0/10    ASSESSMENT/Changes in Function: Patient tolerated treatment well with no adverse reactions today. Pt is progressing with therapy as indicated by pt tolerating increase in exercise repetitions and resistance. Pt with increased weight shift to left LE during sit to stands and required verbal and visual cuing to correct. Pt tolerated manual well with significant improvements in ROM following. Although showing progress patient would benefit from continuation of skilled physical therapy to address the remaining limitations.      Patient will continue to benefit from skilled PT services to modify and progress therapeutic interventions, address functional mobility deficits, address ROM deficits, address strength deficits, analyze and address soft tissue restrictions, analyze and cue movement patterns, analyze and modify body mechanics/ergonomics and assess and modify postural abnormalities to attain remaining goals. []  See Plan of Care  []  See progress note/recertification  []  See Discharge Summary         Progress towards goals / Updated goals:  Short Term Goals: To be accomplished in 4 weeks:                   RBOWCTA will report compliance with HEP at least 1x/day to aid in rehabilitation program.                   Status at IE: Patient instructed in and provided written copy of initial Home Exercise Program.                   Current: Patient reports good consistency with HEP two or more times per day.   2/10/2021                      Patient will demonstrate right knee AROM of 0 to 120 degrees to aid in completion of ADLs.                  Status at IE: right knee AROM 5 to 102 degrees.                 Current: right knee AROM extension: lacking 10, right knee flexion : 103  Post manual: extension lacking 3, flexion: 113 degrees, progressing.          Long Term Goals: To be accomplished in 8 weeks:                   Patient will increase strength to 5/5 throughout B LEs to aid in completion of ADLs.                  Status at IE: right knee ext 3+, flex 4-                   Current: Same as IE                      Patient will report pain less than 1-2/10 average to aid in completion of ADLs.                  Status at IE: 5/10                   Current: 0-5/10, progressing well. 2/16/2021                      Patient will perform 5 or more pain free squats with good form/technique to aid in completion of ADLs.                    Status at IE: unable to perform one squat                   Current: Same as IE                      Patient will demonstrate normal gait pattern on level and stairs without device for 1000 ft. .                   Status at IE: antalgic gait with straight cane <200 ft, ambulates stairs one step at a time.                   Current: Same as IE                      Patient will improve FOTO (an established functional score where 100 = no disability) to 71 points overall to demonstrate improvement in functional ability.                    Status at IE:54                   Current: Same as IE    PLAN  [x]  Upgrade activities as tolerated     [x]  Continue plan of care  []  Update interventions per flow sheet       []  Discharge due to:_  []  Other:_      Christin Gutiérrez, PT 2/22/2021  3:23 PM    Future Appointments   Date Time Provider [place]   2/24/2021 10:15 AM Carmina Inoue, PT [facility]   3/1/2021  9:30 AM Carmina Inoue, PT [facility]   3/3/2021 12:30 PM Carmina Inoue, PT [facility]   3/9/2021 10:15 AM Carmina Inoue, PT [facility]   3/11/2021 10:15 AM Carmina Inoue, PT [facility]   3/15/2021  9:30 AM Carmina Inoue, PT [facility]   3/17/2021  9:30 AM Carmina Inoue, PT [facility]   3/22/2021  9:30 AM Carmina Inoue, PT [facility]   3/24/2021  9:30 AM Carmina Inoue, PT [facility]

## 2021-02-24 ENCOUNTER — HOSPITAL ENCOUNTER (OUTPATIENT)
Dept: PHYSICAL THERAPY | Age: 76
Discharge: HOME | End: 2021-02-24
Payer: MEDICARE

## 2021-02-24 PROCEDURE — 97110 THERAPEUTIC EXERCISES: CPT

## 2021-02-24 PROCEDURE — 97016 VASOPNEUMATIC DEVICE THERAPY: CPT

## 2021-02-24 PROCEDURE — 97140 MANUAL THERAPY 1/> REGIONS: CPT

## 2021-02-24 NOTE — PROGRESS NOTES
PT DAILY TREATMENT NOTE    Patient Name: Maribel Peguero  Date:2021  : 1945  [x]  Patient  Verified  Payor: [place] MEDICARE / Plan: [place] MEDICARE PART A & B / Product Type: Medicare /    In time:10:15  Out time:11:25  Total Treatment Time (min): 70  Total Timed Codes (min): 70  1:1 Treatment Time ( [address] only): 60   Visit #: 7 of 20    Treatment Area: Right knee pain [M25.561]    SUBJECTIVE  Pain Level (0-10 scale): 0/10 stiffness  Any medication changes, allergies to medications, adverse drug reactions, diagnosis change, or new procedure performed?: [x] No    [] Yes (see summary sheet for update)  Subjective functional status/changes:   [] No changes reported  Pt reports she felt fine after last session. Pt reports she did take her Advil earlier due to it not kicking in till a while after last session. OBJECTIVE    Modalities Rationale:     decrease edema and decrease pain to improve patient's ability to increase patient's ease with performing functional activities and decrease overall pain and symptoms. min [] Estim, type/location:                                      []  att     []  unatt     []  w/US     []  w/ice    []  w/heat    min []  Mechanical Traction: type/lbs                   []  pro   []  sup   []  int   []  cont    []  before manual    []  after manual    min []  Ultrasound, settings/location:      min []  Iontophoresis w/ dexamethasone, location:                                               []  take home patch       []  in clinic    min []  Ice     []  Heat    location/position:    10 min [x]  Vasopneumatic Device, press/temp: To right knee with pt in sitting. Low pressure     min []  Other:        50 min Therapeutic Exercise:  [] See flow sheet : pt required cuing to keep right knee extended during standing hip extension to increase glut activation and decrease hamstring compensation.  Pt required tactile and verbal cuing during step ups and lateral step up and overs to stop early right knee extension leading to right posterior hip shifting and improve loading prior to step up. Rationale: increase ROM, increase strength and increase proprioception to improve the patients ability to increase patient's ease with performing functional activities and decrease overall pain and symptoms. 10 min Manual Therapy:  Patellar mobilizations in all directions. Retrograde massage to decrease swelling. Scar massage to right scar. Grade III A/P and P/A TFJ mobilizations to increase knee ROM. Rationale: decrease pain, increase ROM, increase tissue extensibility and decrease edema  to improve the patients ability to perform functional activities with decreased pain. The manual therapy interventions were performed at a separate and distinct time from the therapeutic activities interventions. With   [x] TE   [] TA   [] neuro   [] other: Patient Education: [x] Review HEP    [] Progressed/Changed HEP based on:   [] positioning   [] body mechanics   [] transfers   [] heat/ice application    [x] other: pt was educated on adding another knee flexion and extension stretching session during the day. Other Objective/Functional Measures: pt entered gym with [facility]. right knee ROM taken as indicated below. Pain Level (0-10 scale) post treatment: 2/10    ASSESSMENT/Changes in Function: Patient tolerated treatment well with no adverse reactions today. Pt is progressing with therapy as indicated by pt tolerating increase in exercise repetitions and resistance. Pt was challenged with 1/2 inch lower surface for sit to stands, however pt was able to complete with cuing. Pt continues to be challenged with step ups leading with the right LE, indicating decreased right quad strength and control. Pt had improved carry over with right knee extension ROM, however required manual mobilization to improve right knee flexion ROM to previous degree. Ended with VASO to decrease right knee swelling, pt tolerated well. Although showing progress patient would benefit from continuation of skilled physical therapy to address the remaining limitations. Patient will continue to benefit from skilled PT services to modify and progress therapeutic interventions, address functional mobility deficits, address ROM deficits, address strength deficits, analyze and address soft tissue restrictions, analyze and cue movement patterns, analyze and modify body mechanics/ergonomics and assess and modify postural abnormalities to attain remaining goals. []  See Plan of Care  []  See progress note/recertification  []  See Discharge Summary         Progress towards goals / Updated goals:  Short Term Goals: To be accomplished in 4 weeks:                   YSVATVG will report compliance with HEP at least 1x/day to aid in rehabilitation program.                   Status at IE: Patient instructed in and provided written copy of initial Home Exercise Program.                   Current: Patient reports good consistency with HEP two or more times per day.   2/10/2021                      Patient will demonstrate right knee AROM of 0 to 120 degrees to aid in completion of ADLs.                  Status at IE: right knee AROM 5 to 102 degrees.                         Current: right knee AROM extension: lacking 4, right knee flexion : 104  Post manual: extension lacking 3, flexion: 115 degrees, progressing.          Long Term Goals: To be accomplished in 8 weeks:                   Patient will increase strength to 5/5 throughout B LEs to aid in completion of ADLs.                  Status at IE: right knee ext 3+, flex 4-                   Current: Same as IE                      Patient will report pain less than 1-2/10 average to aid in completion of ADLs.                  Status at IE: 5/10                   Current: 0-5/10, progressing well.  2/16/2021                      Patient will perform 5 or more pain free squats with good form/technique to aid in completion of ADLs.                  Status at IE: unable to perform one squat                   Current: Same as IE                      Patient will demonstrate normal gait pattern on level and stairs without device for 1000 ft. .                   Status at IE: antalgic gait with straight cane <200 ft, ambulates stairs one step at a time.                   Current: Same as IE                      Patient will improve FOTO (an established functional score where 100 = no disability) to 71 points overall to demonstrate improvement in functional ability.                    Status at IE:54                   Current: 61 progressing    PLAN  [x]  Upgrade activities as tolerated     [x]  Continue plan of care  []  Update interventions per flow sheet       []  Discharge due to:_  []  Other:_      Margaret Meth, PT 2/24/2021  10:18 AM    Future Appointments   Date Time Provider [place]   3/1/2021  9:30 AM Genette Pelt, PT [facility]   3/3/2021 12:30 PM Genette Pelt, PT [facility]   3/9/2021 10:15 AM Genette Pelt, PT [facility]   3/11/2021 10:15 AM Genette Pelt, PT [facility]   3/15/2021  9:30 AM Genette Pelt, PT [facility]   3/17/2021  9:30 AM Genette Pelt, PT [facility]   3/22/2021  9:30 AM Genette Pelt, PT [facility]   3/24/2021  9:30 AM Genette Pelt, PT [facility]

## 2021-02-25 ENCOUNTER — APPOINTMENT (OUTPATIENT)
Dept: PHYSICAL THERAPY | Age: 76
End: 2021-02-25
Payer: MEDICARE

## 2021-03-01 ENCOUNTER — HOSPITAL ENCOUNTER (OUTPATIENT)
Dept: PHYSICAL THERAPY | Age: 76
Discharge: HOME | End: 2021-03-01
Payer: MEDICARE

## 2021-03-01 PROCEDURE — 97110 THERAPEUTIC EXERCISES: CPT

## 2021-03-01 PROCEDURE — 97140 MANUAL THERAPY 1/> REGIONS: CPT

## 2021-03-01 NOTE — PROGRESS NOTES
[facility] at the [address], [place], [address] [facility]  Phone: [phone]      Fax:  [phone]    Progress Note  Patient name: Derrek Unger Start of Care: 2021   Referral source: Alex Nick MD : [id number]               Medical Diagnosis: Right knee pain [M25.561]    Onset Date: right knee pain DOS: 21              Treatment Diagnosis: right knee pain   Prior Hospitalization: see medical history Provider#: [id number]   Medications: Verified on Patient summary List    Comorbidities: diabetes, HTN, OA. Prior Level of Function: walking 5 miles 3x/wk, aerobics class    Visits from Start of Care: 8    Missed Visits: 0    Progress Towards Goals: Short Term Goals: To be accomplished in 4 weeks:                   Patient will report compliance with HEP at least 1x/day to aid in rehabilitation program.                   Status at IE: Patient instructed in and provided written copy of initial Home Exercise Program.                   Current: Patient reports good compliance with HEP and was updated and reviewed today  3/1/21                      Patient will demonstrate right knee AROM of 0 to 120 degrees to aid in completion of ADLs.                  Status at IE: right knee AROM 5 to 102 degrees.                         [id number]: right knee AROM extension: lacking 3, right knee flexion : 110  Post manual: extension lacking 2, PROM: flexion: 115 degrees, progressing. 3/1/2021       Long Term Goals: To be accomplished in 8 weeks:                   Patient will increase strength to 5/5 throughout B LEs to aid in completion of ADLs.                  Status at IE: right knee ext 3+, flex 4-                   Current: right knee flexion: 4+/5, extension: 4/5 progressing well 3/1/21                      Patient will report pain less than 1-2/10 average to aid in completion of ADLs.                    Status at IE: 5/10                   Current: 0-5/10, progressing well. 3/1/2021                      Patient will perform 5 or more pain free squats with good form/technique to aid in completion of ADLs.                  Status at IE: unable to perform one squat                   Current: progressing pt able to perform squats without pain, however pt with increased weight shift to left LE and hip drop to the left.                     Patient will demonstrate normal gait pattern on level and stairs without device for 1000 ft.                    Status at IE: antalgic gait with straight cane <200 ft, ambulates stairs one step at a time.                   Current: progressing pt can ambulate on level ground without SPC but with supervision for 300 ft.                        Patient will improve FOTO (an established functional score where 100 = no disability) to 71 points overall to demonstrate improvement in functional ability.                  Status at IE:54                   Current: 61 progressing on 2/22/21     Updated goals to be met in 4 more weeks:    1) Patient will demonstrate sit to stand form a 15 inch mat height with minimal UE support in order to improve ease with toilet transfers. Current: Pt can do 10 x 2 sets of sit to stand from 19.5 inches. 2) Pt will have at least . 5 cm decrease in swelling in order to improve right knee flexion ROM. Current: Swelling (cm): 2 inches below mid patella: R:43.5   Mid patella: R:49.0    2 inches above mid patella: R:51    3) Pt will demonstrate a right SLR without quad lag in order to safety progress off of SPC. Current: right quad lag during SLR.        Key Functional Changes: Patient is a 70year old female who presented to therapy s/p right TKA on 1/11/21. Patient started outpatient physical therapy on 2/4/2021 and this is patient's 8th visit. Patient has progressed with skilled physical therapy as indicated by improved strength, improved ROM, decreased right knee swelling and reports of decreased overall pain. Patient currently is experiencing right knee stiffness in the morning and discomfort on the medial aspect of the knee. Pt was educated to not have the knee roll out to the side as this is increasing the discomfort on the medial aspect of the knee. Pt continues to have a right quad lag with SLR therefore was educated to continue with use of cane at this time. Pt with some scar hypertrophy at patellar tendon level, pt was educated to focus on this area with scar massage. Patient has progressed with short and long term goals as indicated above. Although showing progress, patient continues to demonstrate decreased strength, decreased ROM, swelling and pain/stiffness which limits ease with functional activities. Patient would benefit from continuation of skilled physical therapy to address the remaining limitations.       ASSESSMENT/RECOMMENDATIONS:  [x]Continue therapy per initial plan/protocol at a frequency of  2 x per week for 6 weeks  []Continue therapy with the following recommended changes:_____________________      _____________________________________________________________________  []Discontinue therapy progressing towards or have reached established goals  []Discontinue therapy due to lack of appreciable progress towards goals  []Discontinue therapy due to lack of attendance or compliance  []Await Physician's recommendations/decisions regarding therapy  []Other:________________________________________________________________    Thank you for this referral.   Teresa Villegas, PT 3/1/2021 10:44 AM

## 2021-03-01 NOTE — PROGRESS NOTES
PT DAILY TREATMENT NOTE    Patient Name: Kurtis Moreno  Date:3/1/2021  : 1945  [x]  Patient  Verified  Payor: [place] MED[place] / Plan: [place] MEDICARE PART A & B / Product Type: Medicare /    In time:9:24  Out time:10:22  Total Treatment Time (min): 58  Total Timed Codes (min): 58  1:1 Treatment Time ( [address] only): 62   Visit #: 8 of 20    Treatment Area: Right knee pain [M25.561]    SUBJECTIVE  Pain Level (0-10 scale): 0/10 stiffness  Any medication changes, allergies to medications, adverse drug reactions, diagnosis change, or new procedure performed?: [x] No    [] Yes (see summary sheet for update)  Subjective functional status/changes:   [] No changes reported  Pt reports she felt good after last session. Pt reports she just feels stiff in the morning, especially when it is cold and rainy outside. Pt reports she has the most pain with she has her leg stretched. Pt reports she also does have pain on the inside of the right knee when she is walking and moving the right knee. Pt report she can also feel it when she is getting up and doing her heel slides. OBJECTIVE      48 min Therapeutic Exercise:  [] See flow sheet :   Rationale: increase ROM, increase strength and increase proprioception to improve the patients ability to increase patient's ease with performing functional activities and decrease overall pain and symptoms. 10 min Manual Therapy:  Patellar mobilizations in all directions. Retrograde massage to decrease swelling. Scar massage to right scar. Grade III A/P and P/A TFJ mobilizations to increase knee flexion and extension. Rationale: increase ROM, increase tissue extensibility and decrease edema  to improve the patients ability to perform functional activities with decreased pain. The manual therapy interventions were performed at a separate and distinct time from the therapeutic activities interventions.            With   [x] TE   [] TA   [] neuro   [] other: Patient Education: [x] Review HEP [] Progressed/Changed HEP based on:   [] positioning   [] body mechanics   [] transfers   [] heat/ice application    [x] other: Pt was educated on scare massage. Pt was educated on continued use of cane due to quad lag during SLR      Other Objective/Functional Measures: Swelling (cm): 2 inches below mid patella: R:43.5   Mid patella: R:49.0    2 inches above mid patella: R:51    Knee ROM measurements per goals below. Pt entered gym with SPC. Pain Level (0-10 scale) post treatment: 0/10    ASSESSMENT/Changes in Function: Patient is a 70year old female who presented to therapy s/p right TKA on 1/11/21. Patient started outpatient physical therapy on 2/4/2021 and this is patient's 8th visit. Patient has progressed with skilled physical therapy as indicated by improved strength, improved ROM, decreased right knee swelling and reports of decreased overall pain. Patient currently is experiencing right knee stiffness in the morning and discomfort on the medial aspect of the knee. Pt was educated to not have the knee roll out to the side as this is increasing the discomfort on the medial aspect of the knee. Pt continues to have a right quad lag with SLR therefore was educated to continue with use of cane at this time. Pt with some scar hypertrophy at patellar tendon level, pt was educated to focus on this area with scar massage. Patient has progressed with short and long term goals as indicated below. Although showing progress, patient continues to demonstrate decreased strength, decreased ROM, swelling and pain/stiffness which limits ease with functional activities. Patient would benefit from continuation of skilled physical therapy to address the remaining limitations.     Patient will continue to benefit from skilled PT services to modify and progress therapeutic interventions, address functional mobility deficits, address ROM deficits, address strength deficits, analyze and address soft tissue restrictions, analyze and cue movement patterns, analyze and modify body mechanics/ergonomics and assess and modify postural abnormalities to attain remaining goals. []  See Plan of Care  [x]  See progress note/recertification  []  See Discharge Summary         Progress towards goals / Updated goals:  Short Term Goals: To be accomplished in 4 weeks:                   Patient will report compliance with HEP at least 1x/day to aid in rehabilitation program.                   Status at IE: Patient instructed in and provided written copy of initial Home Exercise Program.                   Current: Patient reports good compliance with HEP and was updated and reviewed today  3/1/21                      Patient will demonstrate right knee AROM of 0 to 120 degrees to aid in completion of ADLs.                  Status at IE: right knee AROM 5 to 102 degrees.                         VSSLMDH: right knee AROM extension: lacking 3, right knee flexion : 110  Post manual: extension lacking 2, PROM: flexion: 115 degrees, progressing. 3/1/2021       Long Term Goals: To be accomplished in 8 weeks:                   Patient will increase strength to 5/5 throughout B LEs to aid in completion of ADLs.                  Status at IE: right knee ext 3+, flex 4-                   Current: right knee flexion: 4+/5, extension: 4/5 progressing well 3/1/21                      Patient will report pain less than 1-2/10 average to aid in completion of ADLs.                  Status at IE: 5/10                   Current: 0-5/10, progressing well. 3/1/2021                      Patient will perform 5 or more pain free squats with good form/technique to aid in completion of ADLs.                  Status at IE: unable to perform one squat                   Current: progressing pt able to perform squats without pain, however pt with increased weight shift to left LE and hip drop to the left.                       Patient will demonstrate normal gait pattern on level and stairs without device for 1000 ft.                    Status at IE: antalgic gait with straight cane <200 ft, ambulates stairs one step at a time.                   Current: progressing pt can ambulate on level ground without SPC but with supervision for 300 ft.                       Patient will improve FOTO (an established functional score where 100 = no disability) to 71 points overall to demonstrate improvement in functional ability.                  Status at IE:54                   Current: 61 progressing on 2/22/21    Updated goals to be met in 4 more weeks:  1) Patient will demonstrate sit to stand form a 15 inch mat height with minimal UE support in order to improve ease with toilet transfers. Current: Pt can do 10 x 2 sets of sit to stand from 19.5 inches. 2) Pt will have at least . 5 cm decrease in swelling in order to improve right knee flexion ROM. Current: Swelling (cm): 2 inches below mid patella: R:43.5   Mid patella: R:49.0    2 inches above mid patella: R:51    3) Pt will demonstrate a right SLR without quad lag in order to safety progress off of SPC. Current: right quad lag during SLR.        PLAN  [x]  Upgrade activities as tolerated     [x]  Continue plan of care  []  Update interventions per flow sheet       []  Discharge due to:_  []  Other:_      Margaret Schmid, PT 3/1/2021  9:27 AM    Future Appointments   Date Time Provider Leo Garcia   3/1/2021  9:30 AM Genette Pelt, PT [facility]   3/3/2021 12:30 PM Genette Pelt, PT [facility]   3/9/2021 10:15 AM Genette Pelt, PT [facility]   3/11/2021 10:15 AM Genette Pelt, PT [facility]   3/15/2021  9:30 AM Genette Pelt, PT [facility]   3/17/2021  9:30 AM Genette Pelt, PT [facility]   3/22/2021  9:30 AM Genette Pelt, PT [facility]   3/24/2021  9:30 AM Genette Pelt, PT [facility]

## 2021-03-03 ENCOUNTER — HOSPITAL ENCOUNTER (OUTPATIENT)
Dept: PHYSICAL THERAPY | Age: 76
Discharge: HOME | End: 2021-03-03
Payer: MEDICARE

## 2021-03-03 PROCEDURE — 97110 THERAPEUTIC EXERCISES: CPT

## 2021-03-03 PROCEDURE — 97016 VASOPNEUMATIC DEVICE THERAPY: CPT

## 2021-03-03 PROCEDURE — 97140 MANUAL THERAPY 1/> REGIONS: CPT

## 2021-03-03 NOTE — PROGRESS NOTES
PT DAILY TREATMENT NOTE    Patient Name: Basil Whitaker  Date:3/3/2021  : 1945  [x]  Patient  Verified  Payor: Meena Blanco / Plan: [place] MEDICARE PART A & B / Product Type: Medicare /    In time:12:30  Out time:1:24  Total Treatment Time (min): 54  Total Timed Codes (min): 54  1:1 Treatment Time ( [address] only): 40   Visit #: 9 of 20    Treatment Area: Right knee pain [M25.561]    SUBJECTIVE  Pain Level (0-10 scale): 0/10 just achy  Any medication changes, allergies to medications, adverse drug reactions, diagnosis change, or new procedure performed?: [x] No    [] Yes (see summary sheet for update)  Subjective functional status/changes:   [] No changes reported  Pt reports the mornings are still achy/stiff. And she walked a little longer than normal yesterday she walked about 2 miles. Pt reports she was sore after last session because she didn't have the ice. OBJECTIVE    Modalities Rationale:     decrease edema, decrease inflammation and decrease pain to improve patient's ability to increase patient's ease with performing functional activities and decrease overall pain and symptoms. min [] Estim, type/location:                                      []  att     []  unatt     []  w/US     []  w/ice    []  w/heat    min []  Mechanical Traction: type/lbs                   []  pro   []  sup   []  int   []  cont    []  before manual    []  after manual    min []  Ultrasound, settings/location:      min []  Iontophoresis w/ dexamethasone, location:                                               []  take home patch       []  in clinic    min []  Ice     []  Heat    location/position:    10 min [x]  Vasopneumatic Device, press/temp: Low pressure to right knee with leg elevated.     min []  Other:    [x] Skin assessment post-treatment (if applicable):    [x]  intact    []  redness- no adverse reaction     []redness - adverse reaction:          36 min Therapeutic Exercise:  [] See flow sheet :   Rationale: increase ROM, increase strength, improve coordination and increase proprioception to improve the patients ability to increase patient's ease with performing functional activities and decrease overall pain and symptoms. 8 min Manual Therapy:  Patellar mobilizations in all directions. Retrograde massage to decrease swelling. Scar massage to right scar. Grade III A/P and P/A TFJ mobilizations to increase knee flexion and extension. Rationale: decrease pain, increase ROM, increase tissue extensibility and decrease trigger points to improve the patients ability to perform functional activities with decreased pain. The manual therapy interventions were performed at a separate and distinct time from the therapeutic activities interventions. With   [] TE   [] TA   [] neuro   [] other: Patient Education: [x] Review HEP    [] Progressed/Changed HEP based on:   [] positioning   [] body mechanics   [] transfers   [] heat/ice application    [] other:      Other Objective/Functional Measures: pt entered gym with SPC    Pain Level (0-10 scale) post treatment: 0/10    ASSESSMENT/Changes in Function: Patient tolerated treatment well with no adverse reactions today. Pt with reports of left knee pain therefore were unable to do certain exercises due to pain. Pt continues to have some hypertrophy over scar at medial to distal scar. Pt tolerated manual well and was able to obtain 0 degrees of extension following manual. Pt continues to be challenged with step ups leading with right LE. Pt continues to have a quad lag with SLR, however contraction has improved since last session. Pt tolerated manual well and progressed with range of motion following. Although showing progress patient would benefit from continuation of skilled physical therapy to address the remaining limitations.      Patient will continue to benefit from skilled PT services to modify and progress therapeutic interventions, address functional mobility deficits, address ROM deficits, address strength deficits, analyze and address soft tissue restrictions, analyze and cue movement patterns, analyze and modify body mechanics/ergonomics and assess and modify postural abnormalities to attain remaining goals. []  See Plan of Care  []  See progress note/recertification  []  See Discharge Summary         Progress towards goals / Updated goals:  Short Term Goals: To be accomplished in 4 weeks:                   Patient will report compliance with HEP at least 1x/day to aid in rehabilitation program.                   Status at IE: Patient instructed in and provided written copy of initial Home Exercise Program.                   Current: Patient reports good compliance with HEP and was updated and reviewed today  3/1/21                      Patient will demonstrate right knee AROM of 0 to 120 degrees to aid in completion of ADLs.                  Status at IE: right knee AROM 5 to 102 degrees.                         [id number]: right knee AROM extension: lacking 3, right knee flexion : 110  Post manual: extension 0, AAROM: flexion: 115 degrees, progressing. 3/3/2021       Long Term Goals: To be accomplished in 8 weeks:                   Patient will increase strength to 5/5 throughout B LEs to aid in completion of ADLs.                  Status at IE: right knee ext 3+, flex 4-                   Current: right knee flexion: 4+/5, extension: 4/5 progressing well 3/1/21                      Patient will report pain less than 1-2/10 average to aid in completion of ADLs.                  Status at IE: 5/10                   Current: 0-5/10, progressing well. 3/1/2021                      Patient will perform 5 or more pain free squats with good form/technique to aid in completion of ADLs.                    Status at IE: unable to perform one squat                   Current: progressing pt able to perform squats without pain, however pt with increased weight shift to left LE and hip drop to the left.                     Patient will demonstrate normal gait pattern on level and stairs without device for 1000 ft.                    Status at IE: antalgic gait with straight cane <200 ft, ambulates stairs one step at a time.                   Current: progressing pt can ambulate on level ground without SPC but with supervision for 300 ft.                        Patient will improve FOTO (an established functional score where 100 = no disability) to 71 points overall to demonstrate improvement in functional ability.                  Status at IE:54                   Current: 61 progressing on 2/22/21  Updated goals to be met in 4 more weeks:     1) Patient will demonstrate sit to stand form a 15 inch mat height with minimal UE support in order to improve ease with toilet transfers. Current: Pt can do 10 x 2 sets of sit to stand from 19.5 inches. 2) Pt will have at least . 5 cm decrease in swelling in order to improve right knee flexion ROM. Current: Swelling (cm): 2 inches below mid patella: R:43.5   Mid patella: R:49.0    2 inches above mid patella: R:51    3) Pt will demonstrate a right SLR without quad lag in order to safety progress off of SPC.   Current: right quad lag during SLR.     PLAN  []  Upgrade activities as tolerated     [x]  Continue plan of care  []  Update interventions per flow sheet       []  Discharge due to:_  []  Other:_      JOVANNI Ryan 3/3/2021  12:39 PM    Future Appointments   Date Time Provider Leo Garcia   3/9/2021 10:15 AM Genette Pelt, PT [facility]   3/11/2021 10:15 AM Genette Pelt, PT [facility]   3/15/2021  9:30 AM [facility]   3/17/2021  9:30 AM Genette Pelt, PT [facility]   3/22/2021  9:30 AM Genette Pelt, PT [facility]   3/24/2021  9:30 AM Genette Pelt, PT [facility]

## 2021-03-09 ENCOUNTER — HOSPITAL ENCOUNTER (OUTPATIENT)
Dept: PHYSICAL THERAPY | Age: 76
Discharge: HOME | End: 2021-03-09
Payer: MEDICARE

## 2021-03-09 PROCEDURE — 97110 THERAPEUTIC EXERCISES: CPT

## 2021-03-09 PROCEDURE — 97016 VASOPNEUMATIC DEVICE THERAPY: CPT

## 2021-03-09 NOTE — PROGRESS NOTES
PT DAILY TREATMENT NOTE    Patient Name: Naz Bull  Date:3/9/2021  : 1945  [x]  Patient  Verified  Payor: Ivis Sarah / Plan: [place] MEDICARE PART A & B / Product Type: Medicare /    In time:10:13  Out time:11:19  Total Treatment Time (min): 66  Total Timed Codes (min): 66  1:1 Treatment Time ( [address] only): 46   Visit #: 10 of 20    Treatment Area: Right knee pain [M25.561]    SUBJECTIVE  Pain Level (0-10 scale): 4/10  Any medication changes, allergies to medications, adverse drug reactions, diagnosis change, or new procedure performed?: [x] No    [] Yes (see summary sheet for update)  Subjective functional status/changes:   [] No changes reported  Pt reports she has just been stiff, her knees are hurting, she is still getting the clicking. OBJECTIVE    Modalities Rationale:     decrease edema to improve patient's ability to increase patient's ease with performing functional activities and decrease overall pain and symptoms. min [] Estim, type/location:                                      []  att     []  unatt     []  w/US     []  w/ice    []  w/heat    min []  Mechanical Traction: type/lbs                   []  pro   []  sup   []  int   []  cont    []  before manual    []  after manual    min []  Ultrasound, settings/location:      min []  Iontophoresis w/ dexamethasone, location:                                               []  take home patch       []  in clinic    min []  Ice     []  Heat    location/position:    10 min [x]  Vasopneumatic Device, press/temp: To right knee with elevation. min []  Other:        43 min Therapeutic Exercise:  [x] See flow sheet :   Rationale: increase ROM, increase strength and increase proprioception to improve the patients ability to increase patient's ease with performing functional activities and decrease overall pain and symptoms. 8 min Manual Therapy:  PROM to right knee in flexion and extension patellar mobilizations in all directions.  Scar massage, retrograde massage to decrease swelling. Rationale: decrease pain, increase ROM and decrease edema  to improve the patients ability to perform functional activities with decreased pain. The manual therapy interventions were performed at a separate and distinct time from the therapeutic activities interventions. With   [x] TE   [] TA   [] neuro   [] other: Patient Education: [x] Review HEP    [] Progressed/Changed HEP based on:   [] positioning   [] body mechanics   [] transfers   [] heat/ice application    [] other:      Other Objective/Functional Measures: Swelling (cm): 2 inches below mid patella: R:44.5   Mid patella: R:48.0    2 inches above mid patella: R:51.8   Knee ROM measurements per goals below. Pt entered gym with SPC. Pain Level (0-10 scale) post treatment: 0/10    ASSESSMENT/Changes in Function: Patient tolerated treatment well with no adverse reactions today. Pt is progressing with therapy as indicated by pt tolerating increase in exercise repetitions and resistance. Patient will follow up with the MD tomorrow and discuss the sensation of clicking and locking up in the front on the right knee. Pt continues to have increased scaring across the front of the knee and clicking at the patella during weight bearing right knee flexion. Pt continues to have right quad lag and reports of right knee catching, therefore educated patient to continue with cane use for safety. Although showing progress patient would benefit from continuation of skilled physical therapy to address the remaining limitations.      Patient will continue to benefit from skilled PT services to modify and progress therapeutic interventions, address functional mobility deficits, address ROM deficits, address strength deficits, analyze and address soft tissue restrictions, analyze and cue movement patterns, analyze and modify body mechanics/ergonomics and assess and modify postural abnormalities to attain remaining goals.     []  See Plan of Care  []  See progress note/recertification  []  See Discharge Summary         Progress towards goals / Updated goals:  Short Term Goals: To be accomplished in 4 weeks:                   Patient will report compliance with HEP at least 1x/day to aid in rehabilitation program.                   Status at IE: Patient instructed in and provided written copy of initial Home Exercise Program.                   Current: Patient reports good compliance with HEP and was updated and reviewed today  3/1/21                      Patient will demonstrate right knee AROM of 0 to 120 degrees to aid in completion of ADLs.                  Status at IE: right knee AROM 5 to 102 degrees.                         [id number]: right knee AROM extension: lacking 2, right knee flexion : 110  Post manual: extension 0, AAROM: flexion: 116 degrees, progressing. 3/9/2021       Long Term Goals: To be accomplished in 8 weeks:                   Patient will increase strength to 5/5 throughout B LEs to aid in completion of ADLs.                  Status at IE: right knee ext 3+, flex 4-                   Current: right knee flexion: 4+/5, extension: 4/5 progressing well 3/1/21                      Patient will report pain less than 1-2/10 average to aid in completion of ADLs.                  Status at IE: 5/10                   Current: 0-5/10, progressing well. 3/1/2021                      Patient will perform 5 or more pain free squats with good form/technique to aid in completion of ADLs.                  Status at IE: unable to perform one squat                   Current: progressing pt able to perform squats without pain, however pt with increased weight shift to left LE and hip drop to the left.                       Patient will demonstrate normal gait pattern on level and stairs without device for 1000 ft.                    Status at IE: antalgic gait with straight cane <200 ft, ambulates stairs one step at a time.                   Current: progressing pt can ambulate on level ground without SPC but with supervision for 300 ft.                        Patient will improve FOTO (an established functional score where 100 = no disability) to 71 points overall to demonstrate improvement in functional ability.                  Status at IE:54                   Current: 61 progressing on 2/22/21    Updated goals to be met in 4 more weeks:     1) Patient will demonstrate sit to stand form a 15 inch mat height with minimal UE support in order to improve ease with toilet transfers. Current: Pt can do 10 x 2 sets of sit to stand from 19.5 inches. 2) Pt will have at least . 5 cm decrease in swelling in order to improve right knee flexion ROM. Current: Swelling (cm): 2 inches below mid patella: R:43.5   Mid patella: R:49.0    2 inches above mid patella: R:51    3) Pt will demonstrate a right SLR without quad lag in order to safety progress off of SPC. Current: right quad lag during SLR.     PLAN  []  Upgrade activities as tolerated     [x]  Continue plan of care  []  Update interventions per flow sheet       []  Discharge due to:_  [x]  Other:_  F.u about MD visit.      [place], [place] 3/9/2021  10:40 AM    Future Appointments   Date Time Provider Leo Garcia   3/11/2021 10:15 AM Landy Pham PT [facility]   3/15/2021  9:30 AM Landy Pham PT [facility]   3/17/2021  9:30 AM Landy Pham PT [facility]   3/22/2021  9:30 AM Landy Pham PT [facility]   3/24/2021  9:30 AM Landy Pham PT [facility]

## 2021-03-11 ENCOUNTER — HOSPITAL ENCOUNTER (OUTPATIENT)
Dept: PHYSICAL THERAPY | Age: 76
Discharge: HOME | End: 2021-03-11
Payer: MEDICARE

## 2021-03-11 PROCEDURE — 97016 VASOPNEUMATIC DEVICE THERAPY: CPT

## 2021-03-11 PROCEDURE — 97110 THERAPEUTIC EXERCISES: CPT

## 2021-03-11 NOTE — PROGRESS NOTES
PT DAILY TREATMENT NOTE    Patient Name: Tri Lobo  Date:3/11/2021  : 1945  [x]  Patient  Verified  Payor: [place] MEDICARE / Plan: [place] MEDICARE PART A & B / Product Type: Medicare /    In time:10:15  Out time:11:29  Total Treatment Time (min): 64  Total Timed Codes (min): 64  1:1 Treatment Time ([facility] only): 52   Visit #:  of 20    Treatment Area: Right knee pain [M25.561]    SUBJECTIVE  Pain Level (0-10 scale): 0/10  Any medication changes, allergies to medications, adverse drug reactions, diagnosis change, or new procedure performed?: [x] No    [] Yes (see summary sheet for update)  Subjective functional status/changes:   [] No changes reported  Pt reports her Dr. Darvin Cortez went well. Pt reports he told her to put merderna on the scar and the sensations she was feeling were normal, the X-rays look good. Pt reports she wasn't able to get the knee to do the locking sensation for the Dr. Laura Romano    Modalities Rationale:     decrease edema, decrease inflammation and decrease pain to improve patient's ability to increase patient's ease with performing functional activities and decrease overall pain and symptoms. min [] Estim, type/location:                                      []  att     []  unatt     []  w/US     []  w/ice    []  w/heat    min []  Mechanical Traction: type/lbs                   []  pro   []  sup   []  int   []  cont    []  before manual    []  after manual    min []  Ultrasound, settings/location:      min []  Iontophoresis w/ dexamethasone, location:                                               []  take home patch       []  in clinic    min []  Ice     []  Heat    location/position:    10 min [x]  Vasopneumatic Device, press/temp:  To right knee low pressure    min []  Other:        49 min Therapeutic Exercise:  [x] See flow sheet : scar massage and patellar mobilizations right knee   Rationale: increase ROM, increase strength and increase proprioception to improve the patients ability to increase patient's ease with performing functional activities and decrease overall pain and symptoms. With   [] TE   [] TA   [] neuro   [] other: Patient Education: [x] Review HEP    [] Progressed/Changed HEP based on:   [] positioning   [] body mechanics   [] transfers   [] heat/ice application    [] other:      Other Objective/Functional Measures: right knee flexion: 119 AAROM, extension 0 degrees. Pain Level (0-10 scale) post treatment: 0/10    ASSESSMENT/Changes in Function: Patient tolerated treatment well with no adverse reactions today. Pt is progressing with therapy as indicated by pt tolerating increase in exercise repetitions. Pt's scar continues to be raising up higher and higher each session. Pt is progressing with range of motion. Pt continues to require tactile, visual and verbal cuing to perform quad set and straight leg raise to decrease quad lag. Although showing progress patient would benefit from continuation of skilled physical therapy to address the remaining limitations. Patient will continue to benefit from skilled PT services to modify and progress therapeutic interventions, address functional mobility deficits, address ROM deficits, address strength deficits, analyze and address soft tissue restrictions, analyze and cue movement patterns, analyze and modify body mechanics/ergonomics and assess and modify postural abnormalities to attain remaining goals.      []  See Plan of Care  []  See progress note/recertification  []  See Discharge Summary         Progress towards goals / Updated goals:  Short Term Goals: To be accomplished in 4 weeks:                   Patient will report compliance with HEP at least 1x/day to aid in rehabilitation program.                   Status at IE: Patient instructed in and provided written copy of initial Home Exercise Program.                   Current: Patient reports good compliance with HEP and was updated and reviewed today  3/1/21                      Patient will demonstrate right knee AROM of 0 to 120 degrees to aid in completion of ADLs.                  Status at IE: right knee AROM 5 to 102 degrees.                         HAOIPQK: right knee AROM extension: lacking 2, right knee flexion : 110  Post manual: extension 0, AAROM: flexion: 110 degrees, progressing. 3/11/2021       Long Term Goals: To be accomplished in 8 weeks:                   Patient will increase strength to 5/5 throughout B LEs to aid in completion of ADLs.                  Status at IE: right knee ext 3+, flex 4-                   Current: right knee flexion: 4+/5, extension: 4/5 progressing well 3/1/21                      Patient will report pain less than 1-2/10 average to aid in completion of ADLs.                  Status at IE: 5/10                   Current: 0-5/10, progressing well. 3/1/2021                      Patient will perform 5 or more pain free squats with good form/technique to aid in completion of ADLs.                  Status at IE: unable to perform one squat                   Current: progressing pt able to perform squats without pain, however pt with increased weight shift to left LE and hip drop to the left.                     Patient will demonstrate normal gait pattern on level and stairs without device for 1000 ft.                    Status at IE: antalgic gait with straight cane <200 ft, ambulates stairs one step at a time.                   Current: progressing pt can ambulate on level ground without SPC but with supervision for 300 ft.                        Patient will improve FOTO (an established functional score where 100 = no disability) to 71 points overall to demonstrate improvement in functional ability.                    Status at IE:54                   Current: 61 progressing on 2/22/21  Updated goals to be met in 4 more weeks:     1) Patient will demonstrate sit to stand form a 15 inch mat height with minimal UE support in order to improve ease with toilet transfers. Current: Pt can do 10 x 2 sets of sit to stand from 19.5 inches. 2) Pt will have at least . 5 cm decrease in swelling in order to improve right knee flexion ROM. Current: Swelling (cm): 2 inches below mid patella: R:43.5   Mid patella: R:49.0    2 inches above mid patella: R:51    3) Pt will demonstrate a right SLR without quad lag in order to safety progress off of SPC.   Current: right quad lag during SLR.     PLAN  []  Upgrade activities as tolerated     [x]  Continue plan of care  []  Update interventions per flow sheet       []  Discharge due to:_  []  Other:_      Caleb Aguilera PT 3/11/2021  10:34 AM    Future Appointments   Date Time Provider Leo Garcia   3/15/2021  9:30 AM Daija Quant, PT [facility]   3/17/2021  9:30 AM [place] Quant, PT [facility] [place] [facility]   3/22/2021  9:30 AM [place] Quant, PT [facility]   3/24/2021  9:30 AM Daija Quant, PT [facility]

## 2021-03-15 ENCOUNTER — HOSPITAL ENCOUNTER (OUTPATIENT)
Dept: PHYSICAL THERAPY | Age: 76
Discharge: HOME | End: 2021-03-15
Payer: MEDICARE

## 2021-03-15 PROCEDURE — 97110 THERAPEUTIC EXERCISES: CPT

## 2021-03-15 PROCEDURE — 97016 VASOPNEUMATIC DEVICE THERAPY: CPT

## 2021-03-15 NOTE — PROGRESS NOTES
PT DAILY TREATMENT NOTE    Patient Name: Stanley Bills  Date:3/15/2021  : 1945  [x]  Patient  Verified  Payor: [employer] / Plan: [place] MEDICARE PART A & B / Product Type: Medicare /    In time:9:25  Out time:10:45  Total Treatment Time (min): 80  Total Timed Codes (min): 80  1:1 Treatment Time ( [address] only): 70   Visit #: 12 of 20    Treatment Area: Right knee pain [M25.561]    SUBJECTIVE  Pain Level (0-10 scale): 0/10 just stiff  Any medication changes, allergies to medications, adverse drug reactions, diagnosis change, or new procedure performed?: [x] No    [] Yes (see summary sheet for update)  Subjective functional status/changes:   [] No changes reported  Pt reports she has been stiff all weekend in both of her knees. Pt reports she doesn't want to try the bike because after her first surgery she did that and she thought that was why she had to go back in. OBJECTIVE    Modalities Rationale:     decrease edema and decrease pain to improve patient's ability to increase patient's ease with performing functional activities and decrease overall pain and symptoms. min [] Estim, type/location:                                      []  att     []  unatt     []  w/US     []  w/ice    []  w/heat    min []  Mechanical Traction: type/lbs                   []  pro   []  sup   []  int   []  cont    []  before manual    []  after manual    min []  Ultrasound, settings/location:      min []  Iontophoresis w/ dexamethasone, location:                                               []  take home patch       []  in clinic    min []  Ice     []  Heat    location/position:    10 min [x]  Vasopneumatic Device, press/temp: To right knee supine with elevation. min []  Other:        70 min Therapeutic Exercise:  [x] See flow sheet : pt required cuing during tap downs to decrease right knee valgus progression and to decrease speed to improve control of movement.  Pt required tactile and verbal cuing added hip abduction band walks to keep toes forward to increase glut med activation and tactile cuing at the pelvis to prevent right pelvic drop. scar massage and patellar mobilizations to increase ROM. Rationale: increase ROM, increase strength and increase proprioception to improve the patients ability to increase patient's ease with performing functional activities and decrease overall pain and symptoms. With   [x] TE   [] TA   [] neuro   [] other: Patient Education: [x] Review HEP    [] Progressed/Changed HEP based on:   [] positioning   [] body mechanics   [] transfers   [] heat/ice application    [] other: going down the stairs straight and taking her time. Pt was educated to continue to go up with the left foot leading, but do exercises on the first step like step ups with right foot leading. Other Objective/Functional Measures: pt entered gym with [address] Pain Level (0-10 scale) post treatment: 0/10    ASSESSMENT/Changes in Function: Patient tolerated treatment well with no adverse reactions today. Pt is progressing with therapy as indicated by pt tolerating increase in exercise repetitions and resistance. During step ups pt demonstrated improved weight shift over right LE prior to lifting. Pt also reported decreased right knee \"locking out\" during step ups today compared to previous sessions. Pt did reports swelling in left knee therefore held on certain exercises that increase left knee pressure. Focused on stair training today to improve right knee strength during stair negotiation. Pt required tactile cuing to distal right thigh to increase TKE to initiate transfer of weight to right leg during step up. Pt continues to demonstrate improved right quad set, however continues to have a slight quad lag during SLR and with Min A can perform without quad lag. Pt with improved ease of right knee flexion with strap today.  Although showing progress patient would benefit from continuation of skilled physical therapy to address the remaining limitations. Patient will continue to benefit from skilled PT services to modify and progress therapeutic interventions, address functional mobility deficits, address ROM deficits, address strength deficits, analyze and address soft tissue restrictions, analyze and cue movement patterns, analyze and modify body mechanics/ergonomics and assess and modify postural abnormalities to attain remaining goals. []  See Plan of Care  []  See progress note/recertification  []  See Discharge Summary         Progress towards goals / Updated goals:  Short Term Goals: To be accomplished in 4 weeks:                   Patient will report compliance with HEP at least 1x/day to aid in rehabilitation program.                   Status at IE: Patient instructed in and provided written copy of initial Home Exercise Program.                   Current: Patient reports good compliance with HEP 3/15/21                      Patient will demonstrate right knee AROM of 0 to 120 degrees to aid in completion of ADLs.                  Status at IE: right knee AROM 5 to 102 degrees.                         IISWSCF: right knee AROM extension: lacking 2, right knee flexion : 110  Post manual: extension 0, AAROM: flexion: 120 degrees, progressing. 3/15/2021       Long Term Goals: To be accomplished in 8 weeks:                   Patient will increase strength to 5/5 throughout B LEs to aid in completion of ADLs.                  Status at IE: right knee ext 3+, flex 4-                   Current: right knee flexion: 4+/5, extension: 4/5 progressing well 3/1/21                      Patient will report pain less than 1-2/10 average to aid in completion of ADLs.                  Status at IE: 5/10                   Current: 0-5/10, progressing well. 3/1/2021                      Patient will perform 5 or more pain free squats with good form/technique to aid in completion of ADLs.                    Status at IE: unable to perform one squat                   Current: progressing pt able to perform squats without pain, however pt with increased weight shift to left LE and hip drop to the left.                     Patient will demonstrate normal gait pattern on level and stairs without device for 1000 ft.                    Status at IE: antalgic gait with straight cane <200 ft, ambulates stairs one step at a time.                   Current: progressing pt can ambulate on level ground without SPC but with supervision for 300 ft.                        Patient will improve FOTO (an established functional score where 100 = no disability) to 71 points overall to demonstrate improvement in functional ability.                  Status at IE:54                   Current: 61 progressing on 2/22/21  Updated goals to be met in 4 more weeks:     1) Patient will demonstrate sit to stand form a 15 inch mat height with minimal UE support in order to improve ease with toilet transfers. Current: Pt can do 10 x 2 sets of sit to stand from 19.5 inches. 2) Pt will have at least . 5 cm decrease in swelling in order to improve right knee flexion ROM. Current: Swelling (cm): 2 inches below mid patella: R:43.5   Mid patella: R:49.0    2 inches above mid patella: R:51    3) Pt will demonstrate a right SLR without quad lag in order to safety progress off of SPC. Current: right quad lag during SLR.   PLAN  []  Upgrade activities as tolerated     [x]  Continue plan of care  []  Update interventions per flow sheet       []  Discharge due to:_  [x]  Other:_  Give pt updated HEP to add step ups on first step at home with left arm on railing and right UE on cane leading with the right leg.      [place], [place] 3/15/2021  10:11 AM    Future Appointments   Date Time Provider Leo Garcia   3/17/2021  9:30 AM JOVANNI Orozco [facility]   3/22/2021  9:30 AM JOVANNI Orozco [facility]   3/24/2021  9:30 AM JOVANNI Orozco [facility]

## 2021-03-17 ENCOUNTER — HOSPITAL ENCOUNTER (OUTPATIENT)
Dept: PHYSICAL THERAPY | Age: 76
Discharge: HOME | End: 2021-03-17
Payer: MEDICARE

## 2021-03-17 PROCEDURE — 97016 VASOPNEUMATIC DEVICE THERAPY: CPT

## 2021-03-17 PROCEDURE — 97110 THERAPEUTIC EXERCISES: CPT

## 2021-03-17 PROCEDURE — 97140 MANUAL THERAPY 1/> REGIONS: CPT

## 2021-03-17 PROCEDURE — 97530 THERAPEUTIC ACTIVITIES: CPT

## 2021-03-17 NOTE — PROGRESS NOTES
PT DAILY TREATMENT NOTE    Patient Name: Stanley Bills  Date:3/17/2021  : 1945  [x]  Patient  Verified  Payor: [place] MEDICARE / Plan: VA MEDICARE PART A & B / Product Type: Medicare /    In time:9:23  Out time:10:38  Total Treatment Time (min): 75  Total Timed Codes (min): 65  1:1 Treatment Time ( [address] only): 65   Visit #: 13 of 20    Treatment Area: Right knee pain [M25.561]    SUBJECTIVE  Pain Level (0-10 scale): 0/10 just stiff  Any medication changes, allergies to medications, adverse drug reactions, diagnosis change, or new procedure performed?: [x] No    [] Yes (see summary sheet for update)  Subjective functional status/changes:   [] No changes reported  Pt reports she felt good after last session. Pt reports yesterday her knee was so achy because of the cold. OBJECTIVE    Modalities Rationale:     decrease edema and decrease pain to improve patient's ability to increase patient's ease with performing functional activities and decrease overall pain and symptoms. min [] Estim, type/location:                                      []  att     []  unatt     []  w/US     []  w/ice    []  w/heat    min []  Mechanical Traction: type/lbs                   []  pro   []  sup   []  int   []  cont    []  before manual    []  after manual    min []  Ultrasound, settings/location:      min []  Iontophoresis w/ dexamethasone, location:                                               []  take home patch       []  in clinic    min []  Ice     []  Heat    location/position:    10 min [x]  Vasopneumatic Device, press/temp: To right knee with low pressure, and elevation. min []  Other:          40 min Therapeutic Exercise:  [x] See flow sheet :   Rationale: increase ROM, increase strength and increase proprioception to improve the patients ability to increase patient's ease with performing functional activities and decrease overall pain and symptoms.      15 min Therapeutic Activity:  [x]  See flow sheet :focused on stair negotiation as indicated below to improve safety with home negotiation and progress patient to reciprocal stepping pattern. Rationale: increase strength, improve coordination and increase proprioception  to improve the patients ability to increase patient's ease with performing functional activities and decrease overall pain and symptoms. 10 min Manual Therapy:  Right knee proximal A/P and P/A  TFJ mobs grade III to increase knee flexion and extension. Scar massage focusing on mid to distal scar. Patellar mobilizations in all directions. Rationale: decrease pain, increase ROM and increase tissue extensibility to improve the patients ability to perform functional activities with decreased pain. The manual therapy interventions were performed at a separate and distinct time from the therapeutic activities interventions. With   [x] TE   [x] TA   [] neuro   [] other: Patient Education: [x] Review HEP    [] Progressed/Changed HEP based on:   [] positioning   [] body mechanics   [] transfers   [] heat/ice application    [x] other: pt was educated on focusing on middle to distal scar a little more. Other Objective/Functional Measures: Pt required CGA to min A to guard right knee during descending stairs leading with the right LE as it appeared to be giving out. Pt ascended stairs with left UE on hand rail and right UE on SPC to mimic patient's home setting. Pt performed step too gait pattern going up with the right and down with the left (with the exception of two times going down with the right with guarding). Right knee ROM 0-110 pre manual AAROM post manual 0-120 degrees. Pain Level (0-10 scale) post treatment: 0/10    ASSESSMENT/Changes in Function: Patient tolerated treatment well with no adverse reactions today. Pt is progressing with therapy as indicated by pt demonstrating right LE leading with step too pattern stair ascending.  Pt continues to have increased difficulty performing a  TKE with all the weight on the right LE during stair exercises and eccentric tap downs. Pt continues to have clicking at the patella, which appears to be due to right quad weakness and scar tissue. Pt with improved proximal scar mobility, however mid to lower scar was tighers today. Pt tolerated manual well with improved ROM following. Patient continues to have a right quad lag, however less of a lag compared to last session, it is slowly progressing. Although showing progress patient would benefit from continuation of skilled physical therapy to address the remaining limitations. Patient will continue to benefit from skilled PT services to modify and progress therapeutic interventions, address functional mobility deficits, address ROM deficits, address strength deficits, analyze and address soft tissue restrictions, analyze and cue movement patterns, analyze and modify body mechanics/ergonomics and assess and modify postural abnormalities to attain remaining goals. []  See Plan of Care  []  See progress note/recertification  []  See Discharge Summary         Progress towards goals / Updated goals:  Short Term Goals: To be accomplished in 4 weeks:                   CWWHRYN will report compliance with HEP at least 1x/day to aid in rehabilitation program.                   Status at IE: Patient instructed in and provided written copy of initial Home Exercise Program.                   Current: Patient reports good compliance with HEP and updated today 3/17/21                      Patient will demonstrate right knee AROM of 0 to 120 degrees to aid in completion of ADLs.                  Status at IE: right knee AROM 5 to 102 degrees.                           [id number]: right knee AROM extension: lacking 2, right knee flexion : 110  Post manual: extension 0, AAROM: flexion: 120 degrees, progressing. 3/15/2021       Long Term Goals: To be accomplished in 8 weeks:                   Patient will increase strength to 5/5 throughout B LEs to aid in completion of ADLs.                  Status at IE: right knee ext 3+, flex 4-                   Current: right knee flexion: 4+/5, extension: 4/5 progressing well 3/1/21                      Patient will report pain less than 1-2/10 average to aid in completion of ADLs.                  Status at IE: 5/10                   Current: 0-5/10, progressing well. 3/1/2021                      Patient will perform 5 or more pain free squats with good form/technique to aid in completion of ADLs.                  Status at IE: unable to perform one squat                   Current: progressing pt able to perform squats without pain. MET 3/17/21                      Patient will demonstrate normal gait pattern on level and stairs without device for 1000 ft.                    Status at IE: antalgic gait with straight cane <200 ft, ambulates stairs one step at a time.                   Current: progressing pt can ambulate on level ground without SPC but with supervision for 300 ft.                        Patient will improve FOTO (an established functional score where 100 = no disability) to 71 points overall to demonstrate improvement in functional ability.                  Status at IE:54                   Current: 61 progressing on 2/22/21  Updated goals to be met in 4 more weeks:     1) Patient will demonstrate sit to stand form a 15 inch mat height with minimal UE support in order to improve ease with toilet transfers. Current: Pt can do 10 x 2 sets of sit to stand from 19.5 inches. 2) Pt will have at least . 5 cm decrease in swelling in order to improve right knee flexion ROM. Current: Swelling (cm): 2 inches below mid patella: R:43.5   Mid patella: R:49.0    2 inches above mid patella: R:51    3) Pt will demonstrate a right SLR without quad lag in order to safety progress off of SPC.   Current: right quad lag during SLR.     PLAN  []  Upgrade activities as tolerated     [x]  Continue plan of care  []  Update interventions per flow sheet       []  Discharge due to:_  []  Other:_      Richelle Eye, PT 3/17/2021  9:40 AM    Future Appointments   Date Time Provider Leo Garcia   3/22/2021  9:30 AM Aziza Nick PT [facility]   3/24/2021  9:30 AM Aziza Nick PT [facility]   3/29/2021 10:15 AM [facility] PT PRINCE [facility]   3/31/2021 10:15 AM [facility] PT PRINCE [facility]   4/5/2021 11:00 AM [facility] PT PR[facility]CE [facility]   4/7/2021 10:15 AM [facility] PT PR[facility]CE [facility]   4/12/2021 10:15 AM [facility] PT PR[facility]CE [facility]   4/14/2021 10:15 AM [facility] PT PR[facility]CE [facility]   4/19/2021 10:15 AM [facility] PT [facility]   4/21/2021 10:15 AM [facility] PT [facility]   4/26/2021 10:15 AM [facility] PT [facility]   4/28/2021 10:15 AM [facility] PT [facility]

## 2021-03-22 ENCOUNTER — HOSPITAL ENCOUNTER (OUTPATIENT)
Dept: PHYSICAL THERAPY | Age: 76
Discharge: HOME | End: 2021-03-22
Payer: MEDICARE

## 2021-03-22 PROCEDURE — 97110 THERAPEUTIC EXERCISES: CPT

## 2021-03-22 PROCEDURE — 97016 VASOPNEUMATIC DEVICE THERAPY: CPT

## 2021-03-22 PROCEDURE — 97530 THERAPEUTIC ACTIVITIES: CPT

## 2021-03-22 NOTE — PROGRESS NOTES
PT DAILY TREATMENT NOTE    Patient Name: Main Moody  Date:3/22/2021  : 1945  [x]  Patient  Verified  Payor: Jasmyne Donaldson / Plan: [place] / Product Type: Medicare /    In time:9:26  Out time:10:17  Total Treatment Time (min): 51  Total Timed Codes (min): 51  1:1 Treatment Time ( [address] only): 46   Visit #: 14 of 20    Treatment Area: Right knee pain [M25.561]    SUBJECTIVE  Pain Level (0-10 scale): 0/10 just stiff  Any medication changes, allergies to medications, adverse drug reactions, diagnosis change, or new procedure performed?: [x] No    [] Yes (see summary sheet for update)  Subjective functional status/changes:   [] No changes reported  Pt reports she just has so much stiffness in the morning, more when she gets out of the shower and the air is cold it is really hard to move it and it feels so stiff, she is tired of it. OBJECTIVE    Modalities Rationale:     decrease edema and decrease pain to improve patient's ability to  increase patient's ease with performing functional activities and decrease overall pain and symptoms. min [] Estim, type/location:                                      []  att     []  unatt     []  w/US     []  w/ice    []  w/heat    min []  Mechanical Traction: type/lbs                   []  pro   []  sup   []  int   []  cont    []  before manual    []  after manual    min []  Ultrasound, settings/location:      min []  Iontophoresis w/ dexamethasone, location:                                               []  take home patch       []  in clinic    min []  Ice     []  Heat    location/position:    10 min [x]  Vasopneumatic Device, press/temp: To right knee with elevation low pressure    min []  Other:    [] Skin assessment post-treatment (if applicable):    []  intact    []  redness- no adverse reaction     []redness - adverse reaction:          25 min Therapeutic Exercise:  [x] See flow sheet :scar massage, PROM to right knee to increase ROM.     Rationale: increase ROM, increase strength and increase proprioception to improve the patients ability to increase patient's ease with performing functional activities and decrease overall pain and symptoms. 16 min Therapeutic Activity:  [x]  See flow sheet :   Rationale: increase strength, improve coordination and increase proprioception  to improve the patients ability to increase patient's ease with performing functional activities and decrease overall pain and symptoms. With   [] TE   [] TA   [] neuro   [] other: Patient Education: [x] Review HEP    [] Progressed/Changed HEP based on:   [] positioning   [] body mechanics   [] transfers   [] heat/ice application    [x] other: discussed stretching in bed prior to getting up. Discussed possible sleeve to keep the knee warmer. Other Objective/Functional Measures: Swelling (cm): 2 inches below mid patella: R:44.5   Mid patella: R:49.0    2 inches above mid patella: R:51    Knee ROM measurements per goals below. Pt entered gym with SPC. Pain Level (0-10 scale) post treatment: 0/10    ASSESSMENT/Changes in Function: Patient is a 70year old female who presented to therapy with s/pp right total knee on 1/11/21. Patient started outpatient physical therapy on 2/4/21 and this is patient's 14th visit. Patient has progressed with skilled physical therapy as indicated by improved strength, improved ROM and reports of decreased overall pain. Patient has progressed with short and long term goals as indicated below. Patient continues to have increased stiffness in the right knee and patients scar appears to be hypertrophic in nature. Patient has improved ease with getting in/out of the car. Discussed possible use of a knee sleeve to keep the knee more warm and decrease stiffness sensation. Pt with improved stair negotiation, however continues to have small quad lag with SLR therefore continues need for cane.  Although showing progress, patient continues to demonstrate decreased strength, swelling and pain/stiffness which limits ease with functional activities. Patient would benefit from continuation of skilled physical therapy to address the remaining limitations. Patient will continue to benefit from skilled PT services to modify and progress therapeutic interventions, address functional mobility deficits, address ROM deficits, address strength deficits, analyze and address soft tissue restrictions, analyze and cue movement patterns, analyze and modify body mechanics/ergonomics and assess and modify postural abnormalities to attain remaining goals. []  See Plan of Care  [x]  See progress note/recertification  []  See Discharge Summary         Progress towards goals / Updated goals:  Short Term Goals: To be accomplished in 4 weeks:                   [facility] will report compliance with HEP at least 1x/day to aid in rehabilitation program.                   Status at IE: Patient instructed in and provided written copy of initial Home Exercise Program.                   Current: Patient reports good compliance with HEP MET 3/22/21                      Patient will demonstrate right knee AROM of 0 to 120 degrees to aid in completion of ADLs.                  Status at IE: right knee AROM 5 to 102 degrees.                         [id number]: right knee AROM extension: lacking 2, right knee flexion : 116  Post manual: extension 0, AROM flexion: 116, AAROM: flexion: 120 degrees, progressing. 3/22/2021       Long Term Goals: To be accomplished in 8 weeks:                   Patient will increase strength to 5/5 throughout B LEs to aid in completion of ADLs.                  Status at IE: right knee ext 3+, flex 4-                   Current: right knee flexion: 5/5, extension: 4+/5 progressing well 3/22/21                      Patient will report pain less than 1-2/10 average to aid in completion of ADLs.                    Status at IE: 5/10                   Current: 0-5/10, progressing well only gets the 5/10 at the medial right knee. 3/22/2021                      Patient will perform 5 or more pain free squats with good form/technique to aid in completion of ADLs.                  Status at IE: unable to perform one squat                   Current: progressing pt able to perform squats without pain. MET 3/17/21                      Patient will demonstrate normal gait pattern on level and stairs without device for 1000 ft.                    Status at IE: antalgic gait with straight cane <200 ft, ambulates stairs one step at a time.                   Current: progressing pt can ambulate on level ground without SPC but with supervision for 300 ft. Progressing 3/22/21                        Patient will improve FOTO (an established functional score where 100 = no disability) to 71 points overall to demonstrate improvement in functional ability.                  Status at IE:54                   Current: 61 progressing on 2/22/21   Current: 58 decreased from last PN 3/22/21    Updated goals to be met in 4 more weeks:     1) Patient will demonstrate sit to stand form a 15 inch mat height with minimal UE support in order to improve ease with toilet transfers. Last PN: Pt can do 10 x 2 sets of sit to stand from 19.5 inches. Current: progressing patient is able to do 10 sit to stands from 20 inches. 3/22/21  2) Pt will have at least . 5 cm decrease in swelling in order to improve right knee flexion ROM. Last PN: Swelling (cm): 2 inches below mid patella: R:43.5   Mid patella: R:49.0    2 inches above mid patella: R:51   Current:Swelling (cm): 2 inches below mid patella: R:44.5   Mid patella: R:49.0    2 inches above mid patella: R:51        3) Pt will demonstrate a right SLR without quad lag in order to safety progress off of SPC. Last PN: right quad lag during SLR.   Current: progressing small lag is left but improved since last PN. PLAN  []  Upgrade activities as tolerated     [x]  Continue plan of care  []  Update interventions per flow sheet       []  Discharge due to:_  []  Other:_      Sundeep Pina PT 3/22/2021  9:30 AM    Future Appointments   Date Time Provider Leo Garcia   3/24/2021  9:30 AM Robinson Mancini, PT [facility]   3/29/2021 10:15 AM [facility] PT PRINCE [facility]   3/31/2021 10:15 AM [facility] PT PRINCE [facility]   4/5/2021 11:00 AM [facility] PT PRINCE [facility]   4/7/2021 10:15 AM [facility] PT PRINCE [facility]   4/12/2021 10:15 AM [facility] PT PRINCE [facility]   4/14/2021 10:15 AM [facility] PT PRINCE [facility]   4/19/2021 10:15 AM [facility] PT PRINCE [facility]   4/21/2021 10:15 AM [facility] PT PRINCE [facility]   4/26/2021 10:15 AM [facility] PT PRINCE [facility]   4/28/2021 10:15 AM [facility] PT PRINCE [facility]

## 2021-03-24 ENCOUNTER — HOSPITAL ENCOUNTER (OUTPATIENT)
Dept: PHYSICAL THERAPY | Age: 76
Discharge: HOME | End: 2021-03-24
Payer: MEDICARE

## 2021-03-24 PROCEDURE — 97530 THERAPEUTIC ACTIVITIES: CPT

## 2021-03-24 PROCEDURE — 97110 THERAPEUTIC EXERCISES: CPT

## 2021-03-24 PROCEDURE — 97016 VASOPNEUMATIC DEVICE THERAPY: CPT

## 2021-03-24 PROCEDURE — 97140 MANUAL THERAPY 1/> REGIONS: CPT

## 2021-03-24 NOTE — PROGRESS NOTES
PT DAILY TREATMENT NOTE    Patient Name: Deisy Martini  Date:3/24/2021  : 1945  [x]  Patient  Verified  Payor: [place] MEDICARE / Plan: [place] MEDICARE PART A & B / Product Type: Medicare /    In time:9:21  Out time:10:33  Total Treatment Time (min): 72  Total Timed Codes (min): 72  1:1 Treatment Time ( [address] only): 58   Visit #: 15 of 22    Treatment Area: Right knee pain [M25.561]    SUBJECTIVE  Pain Level (0-10 scale): 0/10  Any medication changes, allergies to medications, adverse drug reactions, diagnosis change, or new procedure performed?: [x] No    [] Yes (see summary sheet for update)  Subjective functional status/changes:   [] No changes reported  Pt reports she feels she is starting to see so much progress now. Pt reports this morning she was going back up stairs and she started to go up with her right leg first, pt reports she had to catch herself she was so scared because she didn't even think about it. Pt reports that is the first time she did that so she knows she is getting better. OBJECTIVE    Modalities Rationale:     decrease edema and decrease pain to improve patient's ability to increase patient's ease with performing functional activities and decrease overall pain and symptoms. min [] Estim, type/location:                                      []  att     []  unatt     []  w/US     []  w/ice    []  w/heat    min []  Mechanical Traction: type/lbs                   []  pro   []  sup   []  int   []  cont    []  before manual    []  after manual    min []  Ultrasound, settings/location:      min []  Iontophoresis w/ dexamethasone, location:                                               []  take home patch       []  in clinic    min []  Ice     []  Heat    location/position:    10 min [x]  Vasopneumatic Device, press/temp: To right knee to decrease swelling and pain. Low pressure.      min []  Other:    [] Skin assessment post-treatment (if applicable):    []  intact    []  redness- no adverse reaction     []redness - adverse reaction:          39 min Therapeutic Exercise:  [x] See flow sheet :pt required cuing during eccentric tap downs to increase weight bearing through right LE and to decrease push off on left left LE. Rationale: increase ROM, increase strength and increase proprioception to improve the patients ability to increase patient's ease with performing functional activities and decrease overall pain and symptoms. 15 min Therapeutic Activity:  [x]  See flow sheet : stair negotiation and functional activities such as sit to stand, pt required cuing to increase weight shift to right LE to decrease reliance on left LE. Rationale: increase strength, improve coordination and increase proprioception  to improve the patients ability to increase patient's ease with performing functional activities and decrease overall pain and symptoms. 8 min Manual Therapy:  Scar massage to right knee, patellar mobilizations in all directions. PROM to right knee flexion and extension. A to P  Proximal tibial on femoral joint mobilizations   Rationale: decrease pain, increase ROM and increase tissue extensibility to improve the patients ability to perform functional activities with decreased pain. The manual therapy interventions were performed at a separate and distinct time from the therapeutic activities interventions. With   [x] TE   [] TA   [] neuro   [] other: Patient Education: [x] Review HEP    [] Progressed/Changed HEP based on:   [] positioning   [] body mechanics   [] transfers   [] heat/ice application    [x] other: pt brought her knee sleeve in and discussed trying to wear it when her knee felt cold. Discussed breaking up yard duties and taking her time, using cane when picking up pine cones with her . Other Objective/Functional Measures: pt entered gym with [address] on left side.    Had patient in a mini squat reaching and alternating tapping cones that were on a 8 in step, for 30 seconds to progress patient to be able to clean her shower floor with increased ease, because she can't do that now with bending her knees. Pain Level (0-10 scale) post treatment: 0/10    ASSESSMENT/Changes in Function: Patient tolerated treatment well with no adverse reactions today. Patient is showing significant improvement with stair negotiation as patient is able to perform reciprocal stepping on stairs today, however does still require min verbal cuing and supervision assist for safety with lowing on the right LE. Pt with significant decrease in right knee swelling today and therefore was able to obtain 115 degrees of right knee AROM and then obtained 123 degrees of flexion following manual stretching. Pt continues to have some quad lag with SLR, therefore educated pt on continual use of cane. Although showing progress patient would benefit from continuation of skilled physical therapy to address the remaining limitations and progress patient back to prior level of function. Patient will continue to benefit from skilled PT services to modify and progress therapeutic interventions, address functional mobility deficits, address ROM deficits, address strength deficits, analyze and address soft tissue restrictions, analyze and cue movement patterns, analyze and modify body mechanics/ergonomics and assess and modify postural abnormalities to attain remaining goals.      []  See Plan of Care  []  See progress note/recertification  []  See Discharge Summary         Progress towards goals / Updated goals:  Short Term Goals: To be accomplished in 4 weeks:                   [facility] will report compliance with HEP at least 1x/day to aid in rehabilitation program.                   Status at IE: Patient instructed in and provided written copy of initial Home Exercise Program.                   Current: Patient reports good compliance with HEP MET 3/22/21                      Patient will demonstrate right knee AROM of 0 to 120 degrees to aid in completion of ADLs.                  Status at IE: right knee AROM 5 to 102 degrees.                         [id number]: right knee AROM extension: lacking 2, right knee flexion : 116  Post manual: extension 0, AROM flexion: 116, AAROM: flexion: 120 degrees, progressing. 3/22/2021       Long Term Goals: To be accomplished in 8 weeks:                   Patient will increase strength to 5/5 throughout B LEs to aid in completion of ADLs.                  Status at IE: right knee ext 3+, flex 4-                   Current: right knee flexion: 5/5, extension: 4+/5 progressing well 3/22/21                      Patient will report pain less than 1-2/10 average to aid in completion of ADLs.                  Status at IE: 5/10                   Current: 0-5/10, progressing well only gets the 5/10 at the medial right knee. 3/22/2021                      Patient will perform 5 or more pain free squats with good form/technique to aid in completion of ADLs.                  Status at IE: unable to perform one squat                   Current: progressing pt able to perform squats without pain. MET 3/17/21                      Patient will demonstrate normal gait pattern on level and stairs without device for 1000 ft.                    Status at IE: antalgic gait with straight cane <200 ft, ambulates stairs one step at a time.                   Current: progressing pt can ambulate on level ground without SPC but with supervision for 300 ft. Progressing 3/22/21                        Patient will improve FOTO (an established functional score where 100 = no disability) to 71 points overall to demonstrate improvement in functional ability.                    Status at IE:54                   Current: 61 progressing on 2/22/21              Current: 58 decreased from last PN 3/22/21     Updated goals to be met in 4 more weeks:     1) Patient will demonstrate sit to stand form a 15 inch mat height with minimal UE support in order to improve ease with toilet transfers. Last PN: Pt can do 10 x 2 sets of sit to stand from 19.5 inches. Current: progressing patient is able to performed 5 times sit to stand at 16 inch    2) Pt will have at least . 5 cm decrease in swelling in order to improve right knee flexion ROM. Last PN: Swelling (cm): 2 inches below mid patella: R:43.5   Mid patella: R:49.0    2 inches above mid patella: R:51  Last PN: 3/22/21: Swelling (cm): 2 inches below mid patella: R:44.5   Mid patella: R:49.0    2 inches above mid patella: R:51     Current: partially met 3/24/21:  Swelling (cm): 2 inches below mid patella: R:43.6   Mid patella: R:48.5    2 inches above mid patella: R:50        3) Pt will demonstrate a right SLR without quad lag in order to safety progress off of SPC.   Last PN: right quad lag during SLR.   Current: progressing small lag is left but improved since last PN. 3/22/21       PLAN  []  Upgrade activities as tolerated     [x]  Continue plan of care  []  Update interventions per flow sheet       []  Discharge due to:_  []  Other:_      Kd Luis PT 3/24/2021  9:28 AM    Future Appointments   Date Time Provider Leo Garcia   3/24/2021  9:30 AM JOVANNI Lewis [facility]   3/29/2021 10:15 AM [facility] PT NIKI PADRONHPSURY [facility]   3/31/2021 10:15 AM JOVANNI GarciaHPSURY [facility]   4/5/2021 11:00 AM JOVANNI Garcia [facility]   4/7/2021 10:15 AM [facility] PT NIKI ADAM [facility]   4/12/2021 10:15 AM [facility] PT NIKI PADRONHPSURY [facility]   4/14/2021 10:15 AM [facility] PT PRINCE [facility]   4/19/2021 10:15 AM [facility] PT PRINCE [facility]   4/21/2021 10:15 AM [facility] PT PRINCE [facility]   4/26/2021 10:15 AM [facility] PT PRINCE [facility]   4/28/2021 10:15 AM [facility] PT RPINCE [facility]

## 2021-03-29 ENCOUNTER — HOSPITAL ENCOUNTER (OUTPATIENT)
Dept: PHYSICAL THERAPY | Age: 76
Discharge: HOME | End: 2021-03-29
Payer: MEDICARE

## 2021-03-29 PROCEDURE — 97016 VASOPNEUMATIC DEVICE THERAPY: CPT

## 2021-03-29 PROCEDURE — 97110 THERAPEUTIC EXERCISES: CPT

## 2021-03-29 PROCEDURE — 97140 MANUAL THERAPY 1/> REGIONS: CPT

## 2021-03-29 PROCEDURE — 97530 THERAPEUTIC ACTIVITIES: CPT

## 2021-03-29 NOTE — PROGRESS NOTES
PT DAILY TREATMENT NOTE    Patient Name: Karon Abdon  Date:3/29/2021  : 1945  [x]  Patient  Verified  Payor: [place] MED[place] / Plan: [place] MED[place]ARE PART A & B / Product Type: Medicare /    In time:8:45  Out time:9:57  Total Treatment Time (min): 72  Total Timed Codes (min): 62   1:1 Treatment Time ( [address] only): 62   Visit #: 16 of 22    Treatment Area: Right knee pain [M25.561]    SUBJECTIVE  Pain Level (0-10 scale): 0/10    Any medication changes, allergies to medications, adverse drug reactions, diagnosis change, or new procedure performed?: [x] No    [] Yes (see summary sheet for update)  Subjective functional status/changes:   [] No changes reported  Pt reports that she is stiff. Reports that when it is cold she seems to be more stiffer. Reports that she is compliant with  HEP. Reports that walking around is getting better.     OBJECTIVE:  Pt enters gym in no apparent distress, pt ambulating with SPC, incision healed, swelling noted    Modalities Rationale:     decrease edema, decrease inflammation and decrease pain to improve patient's ability to improve patient's ability to perform pain free ADL's    min [] Estim, type/location:                                      []  att     []  unatt     []  w/US     []  w/ice    []  w/heat    min []  Mechanical Traction: type/lbs                   []  pro   []  sup   []  int   []  cont    []  before manual    []  after manual    min []  Ultrasound, settings/location:      min []  Iontophoresis w/ dexamethasone, location:                                               []  take home patch       []  in clinic    min []  Ice     []  Heat    location/position:    10 min [x]  Vasopneumatic Device, press/temp: Pt in long sit position with bolster under R LE for elevation, medium pressure    min []  Other:    [x] Skin assessment post-treatment (if applicable):    [x]  intact    []  redness- no adverse reaction     []redness - adverse reaction:           32 min Therapeutic Exercise: [x] See flow sheet :   Rationale: increase ROM and increase strength to improve the patients ability to perform daily activities with decreased pain and symptom levels    10 min Therapeutic Activity:  [x]? See flow sheet : stair negotiation and functional activities such as sit to stand, pt required cuing during stairs to perform heel strike, glut set as pt ascends stairs. Pt required verbal cuing to keep foot forward with descending stairs. Pt able to ascend and descend stairs in the gym reciprocally. Rationale: increase strength, improve coordination and increase proprioception  to improve the patients ability to increase patient's ease with performing functional activities and decrease overall pain and symptoms. 10 min Neuromuscular Re-education:  [x]  See flow sheet :   Rationale:  improve coordination, improve balance and increase proprioception  to improve the patients ability to perform daily activities with decreased pain and symptom levels       10 min Manual Therapy: Scar massage to right knee, patellar mobilizations in all directions. Post distal femural mobs grade III to inc knee extension, tibial ER grade III mobs to inc knee extension, at end range of flexion: inf grade III patellar mobs, MFR intramuscular to articularis genu, post proximal tibial grade III mobs to inc knee flexion   Rationale: increase ROM, increase tissue extensibility and decrease trigger points to improve the patients ability to perform daily activities with decreased pain and symptom levels    The manual therapy interventions were performed at a separate and distinct time from the therapeutic activities interventions.           With   [x] TE   [] TA   [] neuro   [] other: Patient Education: [x] Review HEP    [] Progressed/Changed HEP based on:   [] positioning   [] body mechanics   [] transfers   [] heat/ice application    [x] other:      Other Objective/Functional Measures:   Extension: lacking 10/after manual: lacking 7  Knee flexion: 116 degrees/after manual: 120 degrees     Pain Level (0-10 scale) post treatment: 0/10    ASSESSMENT/Changes in Function: Patient tolerated treatment well as there were no adverse reactions today. Pt with noted dec in in ROM so performed manual therapy. Pt with noted inc in ROM after manual therapy. Pt is progressing with therapy as pt was able to perform stairs reciprocally. Pt also tolerating increase in exercise repetitions and resistance. Although showing progress patient would benefit from continuation of skilled physical therapy to address the remaining limitations. Patient will continue to benefit from skilled PT services to  modify and progress therapeutic interventions, address functional mobility deficits, address ROM deficits, address strength deficits, analyze and address soft tissue restrictions, analyze and cue movement patterns and instruct in home and community integration to attain remaining goals. [x]  See Plan of Care  []  See progress note/recertification  []  See Discharge Summary         Progress towards goals / Updated goals:  Short Term Goals: To be accomplished in 4 weeks:                   DXUUNAB will report compliance with HEP at least 1x/day to aid in rehabilitation program.                   Status at IE: Patient instructed in and provided written copy of initial Home Exercise Program.                   Current: Patient reports good compliance with HEP MET 3/29/21                      Patient will demonstrate right knee AROM of 0 to 120 degrees to aid in completion of ADLs.                  Status at IE: right knee AROM 5 to 102 degrees.                           JZLBALV: right knee AROM extension: lacking 10; right knee flexion : 116  Post manual: extension lacking 7, AROM flexion: 120; progressing. 3/29/2021       Long Term Goals: To be accomplished in 8 weeks:                   Patient will increase strength to 5/5 throughout B LEs to aid in completion of ADLs.                  Status at IE: right knee ext 3+, flex 4-                   Current: right knee flexion: 5/5, extension: 4+/5 progressing well 3/22/21                      Patient will report pain less than 1-2/10 average to aid in completion of ADLs.                  Status at IE: 5/10                   Current: 0/10, progressing well, reported hadn't had the pain on the medial knee since last therapy session; 3/29/2021                      Patient will perform 5 or more pain free squats with good form/technique to aid in completion of ADLs.                  Status at IE: unable to perform one squat                   Current: progressing pt able to perform squats without pain. MET 3/17/21                      Patient will demonstrate normal gait pattern on level and stairs without device for 1000 ft.                    Status at IE: antalgic gait with straight cane <200 ft, ambulates stairs one step at a time.                   Current: progressing pt can ambulate on level ground without SPC but with supervision for 300 ft. Pt able to perform stairs in the gym with allyn UE support. Progressing 3/22/21                        Patient will improve FOTO (an established functional score where 100 = no disability) to 71 points overall to demonstrate improvement in functional ability.                  Status at IE:54                   Current: 61 progressing on 2/22/21              Current: 58 decreased from last PN 3/22/21   Current: 3/29/21 will perform again in 3 more visits     Updated goals to be met in 4 more weeks:     1) Patient will demonstrate sit to stand form a 15 inch mat height with minimal UE support in order to improve ease with toilet transfers. Last PN: Pt can do 10 x 2 sets of sit to stand from 19.5 inches.   Current: progressing patient is able to performed 5 times sit to stand at 16 inch     2) Pt will have at least . 5 cm decrease in swelling in order to improve right knee flexion ROM. Last PN: Swelling (cm): 2 inches below mid patella: R:43.5   Mid patella: R:49.0    2 inches above mid patella: R:51  Last PN: 3/22/21: Swelling (cm): 2 inches below mid patella: R:44.5   Mid patella: R:49.0    2 inches above mid patella: R:51     Current: partially met 3/24/21:  Swelling (cm): 2 inches below mid patella: R:43.6   Mid patella: R:48.5    2 inches above mid patella: R:50        3) Pt will demonstrate a right SLR without quad lag in order to safety progress off of SPC.   Last PN: right quad lag during SLR.   Current: progressing small lag is left but improved since last PN. 3/22/21    PLAN  [x]  Upgrade activities as tolerated     [x]  Continue plan of care  [x]  Update interventions per flow sheet       []  Discharge due to:_  []  Other:_      Glenda Bhatt, PT, DPT, [facility] 3/29/2021  8:50 AM    Future Appointments   Date Time Provider Leo Garcia   3/31/2021 10:15 AM Colby Hurleyal, PT [facility]   4/5/2021 11:00 AM Colbytom Hurleyal, PT [facility]   4/7/2021 10:15 AM Colby Jg, PT [facility]   4/12/2021  9:30 AM Colbytom Christiansenscal, PT [facility]   4/15/2021  2:30 PM Colbytom Christiansenscal, PT [facility]   4/19/2021  9:30 AM Colby Jg, PT [facility]   4/22/2021  2:30 PM Colbytom Hurleyal, PT [facility]   4/26/2021  9:30 AM Colbytom Christiansenscal, PT [facility]   4/29/2021  2:30 PM Colby Hurleyal, PT [facility]

## 2021-03-31 ENCOUNTER — HOSPITAL ENCOUNTER (OUTPATIENT)
Dept: PHYSICAL THERAPY | Age: 76
Discharge: HOME | End: 2021-03-31
Payer: MEDICARE

## 2021-03-31 PROCEDURE — 97110 THERAPEUTIC EXERCISES: CPT

## 2021-03-31 PROCEDURE — 97530 THERAPEUTIC ACTIVITIES: CPT

## 2021-03-31 PROCEDURE — 97016 VASOPNEUMATIC DEVICE THERAPY: CPT

## 2021-03-31 PROCEDURE — 97140 MANUAL THERAPY 1/> REGIONS: CPT

## 2021-03-31 NOTE — PROGRESS NOTES
PT DAILY TREATMENT NOTE     Patient Name: Rosalba Mooney  Date:3/31/2021  : 1945  [x]? Patient  Verified  Payor: [place] MEDICARE / Plan: [place] MEDICARE PART A & B / Product Type: Medicare /    In time:10:13  Out time: 11:19  Total Treatment Time (min): 66  Total Timed Codes (min):  56  1:1 Treatment Time ( only):  64  Visit #: 17 of 22     Treatment Area: Right knee pain [M25.561]     SUBJECTIVE  Pain Level (0-10 scale): 2/10 medial right knee                         Any medication changes, allergies to medications, adverse drug reactions, diagnosis change, or new procedure performed?: [x]? No    []? Yes (see summary sheet for update)  Subjective functional status/changes:   []? No changes reported  Pt reports that she is stiff. Reports that she is feeling the barometric pressure. Reports her main complaint is still the stiffness. Reports that she is compliant with  HEP. Reports she is fine with dressing and bathing.     OBJECTIVE:  Pt enters gym in no apparent distress, pt ambulating with SPC, incision healed, swelling noted     Modalities Rationale:     decrease edema, decrease inflammation and decrease pain to improve patient's ability to improve patient's ability to perform pain free ADL's                 min []? Estim, type/location:                                                            []?  att     []?  unatt     []?  w/US     []?  w/ice    []?  w/heat     min []? Mechanical Traction: type/lbs                    []?  pro   []?  sup   []? int   []?  cont    []? before manual    []? after manual     min []? Ultrasound, settings/location:        min []? Iontophoresis w/ dexamethasone, location:                                                []?  take home patch       []? in clinic     min []? Ice     []? Heat    location/position:     10 min [x]? Vasopneumatic Device, press/temp: Pt in long sit position with bolster under R LE for elevation, medium pressure     min []? Other:     []?  Skin assessment post-treatment (if applicable):    [x]? intact    []? redness- no adverse reaction     []? redness - adverse reaction:            36 min Therapeutic Exercise:  [x]? See flow sheet :   Rationale: increase ROM and increase strength to improve the patients ability to perform daily activities with decreased pain and symptom levels     10 min Therapeutic Activity:  [x]? ?  See flow sheet : stair negotiation and functional activities such as sit to stand, pt required cuing during stairs to perform heel strike, glut set as pt ascends stairs. Pt required verbal cuing to keep foot forward with descending stairs. Pt able to ascend and descend stairs in the gym reciprocally. Rationale: increase strength, improve coordination and increase proprioception  to improve the patients ability to increase patient's ease with performing functional activities and decrease overall pain and symptoms.            10 min Manual Therapy: Scar massage to right knee, patellar mobilizations in all directions. Post distal femural mobs grade III to inc knee extension, tibial ER grade III mobs to inc knee extension, at end range of flexion: inf grade III patellar mobs, MFR intramuscular to articularis genu, post proximal tibial grade III mobs to inc knee flexion   Rationale: increase ROM, increase tissue extensibility and decrease trigger points to improve the patients ability to perform daily activities with decreased pain and symptom levels     The manual therapy interventions were performed at a separate and distinct time from the therapeutic activities interventions. With   [x]? TE   []? TA   []? neuro   []? other: Patient Education: [x]? Review HEP    []? Progressed/Changed HEP based on:   []? positioning   []? body mechanics   []? transfers   []? heat/ice application    [x]?  other: provided handout with updated HEP, Educated pt on ways to decrease swelling including massage and wearing compression brace      Other Objective/Functional Measures:   Extension: lacking 7/after manual: lacking 5  Knee flexion: 117 degrees/after manual: 122 degrees /123 degrees after heel slides with strap            Swelling (cm): 2 inches below mid patella: R:43.0   Mid patella: R:46.5    2 inches above mid patella: R:51      Pain Level (0-10 scale) post treatment: 0/10     ASSESSMENT/Changes in Function: Patient tolerated treatment well as there were no adverse reactions today. Pt with noted dec in in ROM so performed manual therapy. Pt with noted inc in ROM after manual therapy. Pt is progressing with therapy as pt was able to perform stairs reciprocally. Pt also tolerating increase in exercise repetitions and resistance. Although showing progress patient would benefit from continuation of skilled physical therapy to address the remaining limitations.      Patient will continue to benefit from skilled PT services to  modify and progress therapeutic interventions, address functional mobility deficits, address ROM deficits, address strength deficits, analyze and address soft tissue restrictions, analyze and cue movement patterns and instruct in home and community integration to attain remaining goals. [x]? See Plan of Care  []? See progress note/recertification  []? See Discharge Summary         Progress towards goals / Updated goals:  Short Term Goals: To be accomplished in 4 weeks:                   RKYRFGL will report compliance with HEP at least 1x/day to aid in rehabilitation program.                   Status at IE: Patient instructed in and provided written copy of initial Home Exercise Program.                   Current: Patient reports good compliance with HEP, added HEP per chart 3/31/21                      Patient will demonstrate right knee AROM of 0 to 120 degrees to aid in completion of ADLs.                    Status at IE: right knee AROM 5 to 102 degrees.                         XBUPILT:  3/04/0144  Extension: lacking 7/after manual: lacking 5; Knee flexion: 117 degrees/after manual: 122 degrees /123 degrees after heel slides with strap      Long Term Goals: To be accomplished in 8 weeks:                   Patient will increase strength to 5/5 throughout B LEs to aid in completion of ADLs.                  Status at IE: right knee ext 3+, flex 4-                   Current: right knee flexion: 5/5, extension: 4+/5 progressing well 3/22/21                      Patient will report pain less than 1-2/10 average to aid in completion of ADLs.                  Status at IE: 5/10                   Current: 210, pain on the medial knee; 3/31/2021                      Patient will perform 5 or more pain free squats with good form/technique to aid in completion of ADLs.                  Status at IE: unable to perform one squat                   Current:  pt able to perform 10 squats without pain. MET 3/31/21                      Patient will demonstrate normal gait pattern on level and stairs without device for 1000 ft.                    Status at IE: antalgic gait with straight cane <200 ft, ambulates stairs one step at a time.                   Current: progressing pt can ambulate on level ground without SPC but with supervision for 300 ft. Pt able to perform stairs reciprocally in the gym with allyn UE support. Progressing 3/31/21                        Patient will improve FOTO (an established functional score where 100 = no disability) to 71 points overall to demonstrate improvement in functional ability.                    Status at IE:54                   Current: 61 progressing on 2/22/21              Current: 58 decreased from last PN 3/22/21              Current: 3/31/21 will perform again in 2 more visits     Updated goals to be met in 4 more weeks:     1) Patient will demonstrate sit to stand form a 15 inch mat height with minimal UE support in order to improve ease with toilet transfers. Last PN: Pt can do 10 x 2 sets of sit to stand from 19.5 inches. Current 3/31/21: progressing patient is able to performed 10 times sit to stand at 16 inch     2) Pt will have at least . 5 cm decrease in swelling in order to improve right knee flexion ROM. Last PN: Swelling (cm): 2 inches below mid patella: R:43.5   Mid patella: R:49.0    2 inches above mid patella: R:51  Last PN: 3/22/21: Swelling (cm): 2 inches below mid patella: R:44.5   Mid patella: R:49.0    2 inches above mid patella: R:51     Current: partially met 3/31/21:  Swelling (cm): 2 inches below mid patella: R:43.0   Mid patella: R:46.5    2 inches above mid patella: R:51          3) Pt will demonstrate a right SLR without quad lag in order to safety progress off of SPC. Last PN: right quad lag during SLR.   Current: progressing-small lag is left but improved since last PN. 3/31/21     PLAN  [x]? Upgrade activities as tolerated     [x]? Continue plan of care  [x]? Update interventions per flow sheet       []? Discharge due to:_  []?   Other:_        oJ George, PT, DPT, [facility] 3/31/2021  10:12 AM    Future Appointments   Date Time Provider Leo Garcia   3/31/2021 10:15 AM Wanda Norris, PT [facility]   4/5/2021 11:00 AM [place] Jr, PT [facility]   4/7/2021 10:15 [facility]   4/12/2021  9:30 AM ErnestoPortland Jr, PT [facility]   4/15/2021  2:30 PM [place] Jr, PT [facility]   4/19/2021  9:30 AM Wanda Norris, PT [facility]   4/22/2021  2:30 PM Wanda Norris, PT [facility]   4/26/2021  9:30 AM JOVANNI Gamble [facility]   4/29/2021  2:30 PM JOVANNI Gamble [facility]

## 2021-04-05 ENCOUNTER — HOSPITAL ENCOUNTER (OUTPATIENT)
Dept: PHYSICAL THERAPY | Age: 76
Discharge: HOME | End: 2021-04-05
Payer: MEDICARE

## 2021-04-05 PROCEDURE — 97530 THERAPEUTIC ACTIVITIES: CPT

## 2021-04-05 PROCEDURE — 97110 THERAPEUTIC EXERCISES: CPT

## 2021-04-05 PROCEDURE — 97140 MANUAL THERAPY 1/> REGIONS: CPT

## 2021-04-05 PROCEDURE — 97016 VASOPNEUMATIC DEVICE THERAPY: CPT

## 2021-04-05 NOTE — PROGRESS NOTES
PT DAILY TREATMENT NOTE    Patient Name: Sheryle Fix  Date:2021  : 1945  [x]  Patient  Verified  Payor: Mazin Aceves / Plan: [place] / Product Type: Medicare /    In time:11:00  Out time:12:03  Total Treatment Time (min): 63  (6 minutes waiting on VASO device)  Total Timed Codes (min): 47   1:1 Treatment Time ([facility] only): 52   Visit #: 18 of 22    Treatment Area: Right knee pain [M25.561]    SUBJECTIVE  Pain Level (0-10 scale): 0/10, no real pain to report  Any medication changes, allergies to medications, adverse drug reactions, diagnosis change, or new procedure performed?: [x] No    [] Yes (see summary sheet for update)  Subjective functional status/changes:   [] No changes reported  Reports that she is stiff. Reports that the colder it is the stiffer she gets. Reports that she did go walking prior to coming to therapy for 15 minutes with the cane. Reports that she is still having difficulty with getting in and out of the car. Reports she wants to keep coming, if anything to make sure she is good with stairs. Reports she needs to be back to normal because she is planning on having surgery on the left knee for torn meniscus. OBJECTIVE:  Pt enters gym in no apparent distress, SPC      Modalities Rationale:     decrease edema, decrease inflammation and decrease pain to improve patient's ability to improve patient's ability to perform pain free ADL's                           min []? ? Estim, type/location:                                                            []? ?  att     []? ?  unatt     []? ?  w/US     []? ?  w/ice    []? ?  w/heat     min []? ?  Mechanical Traction: type/lbs                    []? ?  pro   []? ?  sup   []? ?  int   []? ?  cont    []? ?  before manual    []? ?  after manual     min []? ?  Ultrasound, settings/location:        min []? ?  Iontophoresis w/ dexamethasone, location:                                                []? ?  take home patch       []? ?[address]  [facility]     min []? ?  Ice     []? ?  Heat    location/position:     10 min [x]? ?  Vasopneumatic Device, press/temp: Pt in long sit position with bolster under R LE for elevation, medium pressure     min []? ?  Other:     []? ? Skin assessment post-treatment (if applicable):    [x]? ?  intact    []? ?  redness- no adverse reaction     []? ?redness  adverse reaction:            29 min Therapeutic Exercise:  [x]? ? See flow sheet :   Rationale: increase ROM and increase strength to improve the patients ability to perform daily activities with decreased pain and symptom levels     10 min Therapeutic Activity:  [x]? ??  See flow sheet :Tomy Peed negotiation and functional activities such as sit to stand, pt required cuing during stairs to perform heel strike, glut set as pt ascends stairs. Pt required verbal cuing to keep foot forward with descending stairs. Pt able to ascend and descend stairs in the gym reciprocally. Required verbal cuing with sit to stands to keep chest up, engage glutes and avoid excessive lumbar flexion   Rationale: increase strength, improve coordination and increase proprioception  to improve the patients ability to increase patient's ease with performing functional activities and decrease overall pain and symptoms.            8 min Manual Therapy: Post distal femural mobs grade III with wedge at proximal tibial to inc knee extension, tibial ER grade III mobs with wedge at proximal tibia to inc knee extension, STM to distal hamstring   Rationale: increase ROM, increase tissue extensibility and decrease trigger points to improve the patients ability to perform daily activities with decreased pain and symptom levels     The manual therapy interventions were performed at a separate and distinct time from the therapeutic activities interventions.                                                                 With   [x]? ? TE   [x]? ? TA   []?? neuro   []?? other: Patient Education: [x]? ? Review HEP    []?? Progressed/Changed HEP based on:   []?? positioning   []? ? body mechanics   []? ? transfers   []? ? heat/ice application    [x]? ? other: provided handout with updated HEP, Educated pt on ways to decrease swelling including massage and wearing compression brace      Other Objective/Functional Measures:   Extension: lacking 5/after manual: 0  Knee flexion: 120           SLS on airex: Left: 24\" before needing UE support     Pain Level (0-10 scale) post treatment: 0/10    ASSESSMENT/Changes in Function: Patient tolerated treatment well as there were no adverse reactions today. Pt is progressing with therapy as pt with improvements in knee ROM. Pt also tolerating increase in exercise repetitions and resistance. Pt also progressing in balance as pt was able to perform SLS on foam for 24\" prior to needed UE support. Although showing progress patient would benefit from continuation of skilled physical therapy to address the remaining limitations. Patient will continue to benefit from skilled PT services to  modify and progress therapeutic interventions, address functional mobility deficits, address ROM deficits, address strength deficits, analyze and address soft tissue restrictions, analyze and cue movement patterns and instruct in home and community integration to attain remaining goals. [x]  See Plan of Care  [x]  See progress note/recertification  []  See Discharge Summary         Progress towards goals / Updated goals:  Short Term Goals: To be accomplished in 4 weeks:                   GYBMNNF will report compliance with HEP at least 1x/day to aid in rehabilitation program.                   Status at IE: Patient instructed in and provided written copy of initial Home Exercise Program.                   Current: Patient reports good compliance with HEP, 4/5/21                      Patient will demonstrate right knee AROM of 0 to 120 degrees to aid in completion of ADLs.                    Status at IE: right knee AROM 5 to 102 degrees.                         PPQZUHZ:  3/1/70:   Extension: lacking 5/after manual: 0; Knee flexion: 120  MET  Long Term Goals: To be accomplished in 8 weeks:                   Patient will increase strength to 5/5 throughout B LEs to aid in completion of ADLs.                  Status at IE: right knee ext 3+, flex 4-                   Current: right knee flexion: 5/5, extension: 4+/5 progressing well 3/22/21                      Patient will report pain less than 1-2/10 average to aid in completion of ADLs.                  Status at IE: 5/10                   Current: 010, pain on the medial knee; 4/5/2021                      Patient will perform 5 or more pain free squats with good form/technique to aid in completion of ADLs.                  Status at IE: unable to perform one squat                   Current:  pt able to perform 10 squats without pain. MET 3/31/21                      Patient will demonstrate normal gait pattern on level and stairs without device for 1000 ft.                    Status at IE: antalgic gait with straight cane <200 ft, ambulates stairs one step at a time.                   Current: progressing pt can ambulate on level ground without SPC but with supervision for 300 ft. Pt able to perform stairs reciprocally in the gym with allyn UE support. Progressing 4/5/21                        Patient will improve FOTO (an established functional score where 100 = no disability) to 71 points overall to demonstrate improvement in functional ability.                  Status at IE:54                   Current: 61 progressing on 2/22/21              Current: 58 decreased from last PN 3/22/21              Current: 4/5/21 will perform again next visit     Updated goals to be met in 4 more weeks:     1) Patient will demonstrate sit to stand form a 15 inch mat height with minimal UE support in order to improve ease with toilet transfers.   Last PN: Pt can do 10 x 2 sets of sit to stand from 19.5 inches. Current 4/5/21: progressing patient is able to performed 10 times sit to stand at 16 inch     2) Pt will have at least . 5 cm decrease in swelling in order to improve right knee flexion ROM. Last PN: Swelling (cm): 2 inches below mid patella: R:43.5   Mid patella: R:49.0    2 inches above mid patella: R:51  Last PN: 3/22/21: Swelling (cm): 2 inches below mid patella: R:44.5   Mid patella: R:49.0    2 inches above mid patella: R:51     Current: partially met 3/31/21:  Swelling (cm): 2 inches below mid patella: R:43.0   Mid patella: R:46.5    2 inches above mid patella: R:51          3) Pt will demonstrate a right SLR without quad lag in order to safety progress off of SPC.   Last PN: right quad lag during SLR.   Current:4/5/21 pt able to perform SLR with no quad lag    PLAN  [x]  Upgrade activities as tolerated     [x]  Continue plan of care  [x]  Update interventions per flow sheet       []  Discharge due to:_  []  Other:_      Austin Chu, PT, DPT, C[facility] 4/5/2021  11:03 AM    Future Appointments   Date Time Provider Leo Garcia   4/7/2021 10:15 AM Gara Pretzel, PT [facility]   4/12/2021  9:30 AM Gara Pretzel, PT [facility]   4/15/2021  2:30 PM Gara Pretzel, PT [facility]   4/19/2021  9:30 AM Gara Pretzel, PT [facility]   4/22/2021  2:30 PM Gara Pretzel, PT [facility]   4/26/2021  9:30 AM Gara Pretzel, PT [facility]   4/29/2021  2:30 PM Gara Pretzel, PT [facility]

## 2021-04-07 ENCOUNTER — HOSPITAL ENCOUNTER (OUTPATIENT)
Dept: PHYSICAL THERAPY | Age: 76
Discharge: HOME | End: 2021-04-07
Payer: MEDICARE

## 2021-04-07 PROCEDURE — 97110 THERAPEUTIC EXERCISES: CPT

## 2021-04-07 PROCEDURE — 97530 THERAPEUTIC ACTIVITIES: CPT

## 2021-04-07 PROCEDURE — 97016 VASOPNEUMATIC DEVICE THERAPY: CPT

## 2021-04-07 PROCEDURE — 97112 NEUROMUSCULAR REEDUCATION: CPT

## 2021-04-07 NOTE — PROGRESS NOTES
PT DAILY TREATMENT NOTE    Patient Name: Paris Lam  Date:2021  : 1945  [x]  Patient  Verified  Payor: Yasmine Hillman / Plan: [place] MEDICARE PART A & B / Product Type: Medicare /    In time:10:15  Out time:11:19  Total Treatment Time (min): 64  Total Timed Codes (min): 54  1:1 Treatment Time ( [address] only): 47  Visit #: 20 of 22    Treatment Area: Right knee pain [M25.561]    SUBJECTIVE  Pain Level (0-10 scale): no pain, just usual stiffness  Any medication changes, allergies to medications, adverse drug reactions, diagnosis change, or new procedure performed?: [x] No    [] Yes (see summary sheet for update)  Subjective functional status/changes:   [] No changes reported  Pt reports that it is still difficult to get in and out of the car. Reports that her seat is pulled back as far as it can go. Reports that she didn't take a walk this morning. Reports that the left knee has been buckling more so she is wearing a brace today. OBJECTIVE:  Pt enters gym in no apparent distress, SPC        Modalities Rationale:     decrease edema, decrease inflammation and decrease pain to improve patient's ability to improve patient's ability to perform pain free ADL's                           min []? ?? Estim, type/location:                                                            []? ??  att     []? ??  unatt     []? ??  w/US     []? ??  w/ice    []? ??  w/heat     min []? ??  Mechanical Traction: type/lbs                    []? ??  pro   []? ??  sup   []? ??  int   []? ??  cont    []? ??  before manual    []? ??  after manual     min []? ??  Ultrasound, settings/location:        min []? ??  Iontophoresis w/ dexamethasone, location:                                                []? ??  take home patch       []? ??  in clinic     min []? ??  Ice     []? ??  Heat    location/position:     10 min [x]? ??  Vasopneumatic Device, press/temp: Pt in long sit position with bolster under R LE for elevation, low pressure     min []? ??  Other:   []??? Skin assessment post-treatment (if applicable):    []???  intact    []? ??  redness- no adverse reaction     []? ??redness  adverse reaction:            31 min Therapeutic Exercise:  [x]? ?? See flow sheet :   Rationale: increase ROM and increase strength to improve the patients ability to perform daily activities with decreased pain and symptom levels     8 min Therapeutic Activity:  [x]? ???  See flow sheet : functional activities such as sit to stand, pt required cuing during stairs to perform heel strike, glut set as pt ascends stairs. Pt required verbal cuing to keep foot forward with descending stairs. Pt able to ascend and descend stairs in the gym reciprocally. Required verbal cuing with sit to stands to keep chest up, engage glutes and avoid excessive lumbar flexion   Rationale: increase strength, improve coordination and increase proprioception  to improve the patients ability to increase patient's ease with performing functional activities and decrease overall pain and symptoms.         15 min Neuromuscular Re-education:  [x]  See flow sheet :   Rationale: improve coordination, improve balance and increase proprioception  to improve the patients ability to perform daily activities with decreased pain and symptom levels                                                                      With   [x]? ?? TE   [x]? ?? TA   []??? neuro   []? ?? other: Patient Education: [x]??? Review HEP    []? ?? Progressed/Changed HEP based on:   []??? positioning   []? ?? body mechanics   []? ?? transfers   []? ?? heat/ice application    [x]? ?? other: Added ex to HEP, provided handout, pt appeared to understand      Other Objective/Functional Measures:   Hip flexion: L: 4/5 R: 4+/5  Knee ext: L: 4/5 R: 4+/5     Pain Level (0-10 scale) post treatment: 0/10     ASSESSMENT/Changes in Function: Patient tolerated treatment well as there were no adverse reactions today.  Pt having difficulty with getting in and out of the car, so added seated iam to POC. Pt with a tendency to use UE to bring leg over, and required cuing to not use UE. Pt having more pain in the left knee so did not perform some of the exercises bilaterally. Pt noted to have impaired balance with performing hurdles and required intermittent UE support. Added more balance/proprioception exercises to POC. Pt tolerating increase in exercise repetitions and resistance. Although showing progress patient would benefit from continuation of skilled physical therapy to address the remaining limitations.     Patient will continue to benefit from skilled PT services to  modify and progress therapeutic interventions, address functional mobility deficits, address ROM deficits, address strength deficits, analyze and address soft tissue restrictions, analyze and cue movement patterns and instruct in home and community integration to attain remaining goals. [x]? See Plan of Care  [x]? See progress note/recertification  []? See Discharge Summary         Progress towards goals / Updated goals:  Short Term Goals: To be accomplished in 4 weeks:                   Patient will report compliance with HEP at least 1x/day to aid in rehabilitation program.                   Status at IE: Patient instructed in and provided written copy of initial Home Exercise Program.                   Current: Patient reports good compliance with HEP, 4/7/21                      Patient will demonstrate right knee AROM of 0 to 120 degrees to aid in completion of ADLs.                  Status at IE: right knee AROM 5 to 102 degrees.                         [id number]:  4/7/21:   Extension: lacking 5/after manual: 0; Knee flexion: 120  MET  Long Term Goals: To be accomplished in 8 weeks:                   Patient will increase strength to 5/5 throughout B LEs to aid in completion of ADLs.                    Status at IE: right knee ext 3+, flex 4-                   Current: 4/7/21: Hip flexion: L: 4/5 R: 4+/5; Knee ext: L: 4/5 R: 4+/5                        Patient will report pain less than 1-2/10 average to aid in completion of ADLs.                  Status at IE: 5/10                   Current: 0/10,  4/7/2021                      Patient will perform 5 or more pain free squats with good form/technique to aid in completion of ADLs.                  Status at IE: unable to perform one squat                   Current:  pt able to perform 10 squats without pain. MET 3/31/21                      Patient will demonstrate normal gait pattern on level and stairs without device for 1000 ft.                    Status at IE: antalgic gait with straight cane <200 ft, ambulates stairs one step at a time.                   Current: progressing pt can ambulate on level ground without SPC indep around the gym. Progressing 4/7/21                        Patient will improve FOTO (an established functional score where 100 = no disability) to 71 points overall to demonstrate improvement in functional ability.                  Status at IE:54                   Current: 61 progressing on 2/22/21              Current: 58 decreased from last PN 3/22/21              Current: 4/7/21 will perform again next visit     Updated goals to be met in 4 more weeks:     1) Patient will demonstrate sit to stand form a 15 inch mat height with minimal UE support in order to improve ease with toilet transfers. Last PN: Pt can do 10 x 2 sets of sit to stand from 19.5 inches. Current 4/5/21: progressing patient is able to performed 10 times sit to stand at 16 inch     2) Pt will have at least . 5 cm decrease in swelling in order to improve right knee flexion ROM.   Last PN: Swelling (cm): 2 inches below mid patella: R:43.5   Mid patella: R:49.0    2 inches above mid patella: R:51  Last PN: 3/22/21: Swelling (cm): 2 inches below mid patella: R:44.5   Mid patella: R:49.0    2 inches above mid patella: R:51     Current: partially met 3/31/21:  Swelling (cm): 2 inches below mid patella: R:43.0   Mid patella: R:46.5    2 inches above mid patella: R:51          3) Pt will demonstrate a right SLR without quad lag in order to safety progress off of SPC. Last PN: right quad lag during SLR.   Current:4/7/21 pt able to perform SLR with no quad lag     PLAN  [x]? Upgrade activities as tolerated     [x]? Continue plan of care  [x]? Update interventions per flow sheet       []? Discharge due to:_  []?   Other:_      Oc Finger, PT,DPT, [facility] 4/7/2021  10:17 AM    Future Appointments   Date Time Provider Leo Garcia   4/12/2021  9:30 AM JOVANNI Muse [facility]   4/15/2021  2:30 PM Dionisio Queen PT [facility]   4/19/2021  9:30 AM JOVANNI MuseHPDAJUANW [facility]   4/22/2021  2:30 PM Dionisio Queen PT [facility]   4/26/2021  9:30 AM Dionisio Queen PT [facility]   4/29/2021  2:30 PM JOVANNI MuseHPSURY [facility]

## 2021-04-12 ENCOUNTER — HOSPITAL ENCOUNTER (OUTPATIENT)
Dept: PHYSICAL THERAPY | Age: 76
Discharge: HOME | End: 2021-04-12
Payer: MEDICARE

## 2021-04-12 PROCEDURE — 97110 THERAPEUTIC EXERCISES: CPT

## 2021-04-12 PROCEDURE — 97530 THERAPEUTIC ACTIVITIES: CPT

## 2021-04-12 PROCEDURE — 97112 NEUROMUSCULAR REEDUCATION: CPT

## 2021-04-12 PROCEDURE — 97016 VASOPNEUMATIC DEVICE THERAPY: CPT

## 2021-04-12 NOTE — PROGRESS NOTES
PT DAILY TREATMENT NOTE    Patient Name: Mercedes Armando  Date:2021  : 1945  [x]  Patient  Verified  [place]or: [place] / Plan: [place] A & B / Product Type: Medicare /    In time:9:30  Out time: 10:35  Total Treatment Time (min): 65  Total Timed Codes (min):  55  1:1 Treatment Time ( [address] only): 54  Visit #: 20 of 22    Treatment Area: Right knee pain [M25.561]    SUBJECTIVE  Pain Level (0-10 scale): 3/10  Any medication changes, allergies to medications, adverse drug reactions, diagnosis change, or new procedure performed?: [x] No    [] Yes (see summary sheet for update)  Subjective functional status/changes:   [] No changes reported  Pt reports that she had a bad weekend. Reports that she did get her 2nd COVID shot on Friday, but just rested afterwards. Reports that it maybe the barometric pressure as well. Reports that both knees were hurting. Reports that her bones were hurting, her whole leg was sore. Reports she did her standing exercises and heel slides. Reports that she still has difficulty with going up stairs. OBJECTIVE:  Pt enters gym in apparent distress, SPC        Modalities Rationale:     decrease edema, decrease inflammation and decrease pain to improve patient's ability to improve patient's ability to perform pain free ADL's                           min []???? Estim, type/location:                                                            []????  att     []????  unatt     []????  w/US     []????  w/ice    []????  w/heat     min []????  Mechanical Traction: type/lbs                    []????  pro   []? ???  sup   []? ???  int   []? ???  cont    []????  before manual    []????  after manual     min []????  Ultrasound, settings/location:        min []????  Iontophoresis w/ dexamethasone, location:                                                []????  take home patch       []????  in clinic     min []????  Ice     []????  Heat    location/position:     10 min [x]????  Vasopneumatic Device, press/temp: Pt in long sit position with bolster under R LE for elevation, low pressure     min []????  Other:     []???? Skin assessment post-treatment (if applicable):    []????  intact    []????  redness- no adverse reaction     []????redness  adverse reaction:            30 min Therapeutic Exercise:  [x]? ??? See flow sheet :   Rationale: increase ROM and increase strength to improve the patients ability to perform daily activities with decreased pain and symptom levels     10 min Therapeutic Activity:  [x]? ????  See flow sheet : functional activities such as sit to stand, pt requiring cuing for foot placement, not performing excessive forward trunk lean, keeping chest upright and not letting knees fall into valgus. Performed stairs in the gym, ptt required cuing during stairs to perform heel strike, glut set as pt ascends stairs. Pt able to ascend and descend stairs in the gym reciprocally.    Rationale: increase strength, improve coordination and increase proprioception  to improve the patients ability to increase patient's ease with performing functional activities and decrease overall pain and symptoms.      NC min Manual Therapy: At end range of flexion: MFR strumming to articularis genu, inf grade III patellar glides, post proximal tibial grade III mobs to inc knee flexion   The manual therapy interventions were performed at a separate and distinct time from the therapeutic activities interventions. Rationale:  increase ROM and increase tissue extensibility to perform daily activities with decreased pain and symptom levels       15 min Neuromuscular Re-education:  [x]?   See flow sheet :   Rationale: improve coordination, improve balance and increase proprioception  to improve the patients ability to perform daily activities with decreased pain and symptom levels                                                                   With   [x]? ??? TE   [x]? ??? TA   []???? neuro   []???? other: Patient Education: [x]???? Review HEP    []???? Progressed/Changed HEP based on:   []???? positioning   []???? body mechanics   []???? transfers   []???? heat/ice application    [x]???? other:added step ups to HEP, provided handout, and pt appeared to understand      Other Objective/Functional Measures:   Knee flexion: AROM (degrees) 116    Pain Level (0-10 scale) post treatment: 0/10    ASSESSMENT/Changes in Function: Patient tolerated therapy session fairly well but came into therapy with inc pain. Pt with slightly dec in range from previous visit so performed manual techniques, but unable to inc range. Pt is progressing with therapy as indicated by pt tolerating increase in exercise repetitions and resistance. Pt not using UE support to perform seated hurdles. Pt fearful performing hurdles, feeling that LE will not support her weight. Although showing progress patient would benefit from continuation of skilled physical therapy to address the remaining limitations. Patient will continue to benefit from skilled PT services to  modify and progress therapeutic interventions, address functional mobility deficits, address ROM deficits, address strength deficits, analyze and address soft tissue restrictions, analyze and cue movement patterns and instruct in home and community integration to attain remaining goals.      [x]  See Plan of Care  [x]  See progress note/recertification  []  See Discharge Summary         Progress towards goals / Updated goals:  Short Term Goals: To be accomplished in 4 weeks:                   Patient will report compliance with HEP at least 1x/day to aid in rehabilitation program.                   Status at IE: Patient instructed in and provided written copy of initial Home Exercise Program.                   Current: Patient reports good compliance with HEP, added to HEP today. 4/12/21                      Patient will demonstrate right knee AROM of 0 to 120 degrees to aid in completion of ADLs.                  Status at IE: right knee AROM 5 to 102 degrees.                         EGMCUCX:  5/09? 21: Knee flexion: 116 -had a bad weekend, will continue to address    Long Term Goals: To be accomplished in 8 weeks:                   Patient will increase strength to 5/5 throughout B LEs to aid in completion of ADLs.                  Status at IE: right knee ext 3+, flex 4-                   Current: 4/7/21: Hip flexion: L: 4/5 R: 4+/5; Knee ext: L: 4/5 R: 4+/5                         Patient will report pain less than 1-2/10 average to aid in completion of ADLs.                  Status at IE: 5/10                   Current: 3/10 at the beginning but 0/10 at the end of session  4/12/2021                      Patient will perform 5 or more pain free squats with good form/technique to aid in completion of ADLs.                  Status at IE: unable to perform one squat                   Current:  pt able to perform 10 squats without pain. MET 3/31/21                      Patient will demonstrate normal gait pattern on level and stairs without device for 1000 ft.                    Status at IE: antalgic gait with straight cane <200 ft, ambulates stairs one step at a time.                   Current: progressing pt can ambulate on level ground without SPC indep around the gym. Progressing 4/12/21                        Patient will improve FOTO (an established functional score where 100 = no disability) to 71 points overall to demonstrate improvement in functional ability.                    Status at IE:54                              Current: 4/12/21 61 increased from last PN     Updated goals to be met in 4 more weeks:     1) Patient will demonstrate sit to stand form a 15 inch mat height with minimal UE support in order to improve ease with toilet transfers. Last PN: Pt can do 10 x 2 sets of sit to stand from 19.5 inches. Current 4/12/21: progressing patient is able to performed 10 times sit to stand at 16 inch with 6.7# medicine ball     2) Pt will have at least . 5 cm decrease in swelling in order to improve right knee flexion ROM. Last PN: Swelling (cm): 2 inches below mid patella: R:43.5   Mid patella: R:49.0    2 inches above mid patella: R:51  Last PN: 3/22/21: Swelling (cm): 2 inches below mid patella: R:44.5   Mid patella: R:49.0    2 inches above mid patella: R:51     Current: partially met 3/31/21:  Swelling (cm): 2 inches below mid patella: R:43.0   Mid patella: R:46.5    2 inches above mid patella: R:51          3) Pt will demonstrate a right SLR without quad lag in order to safety progress off of SPC.   Last PN: right quad lag during SLR.   Current:4/12/21 pt able to perform SLR with no quad lag      PLAN  [x]  Upgrade activities as tolerated     [x]  Continue plan of care  [x]  Update interventions per flow sheet       []  Discharge due to:_  []  Other:_      Austin Chu, PT, DPT, [facility] 4/12/2021  9:34 AM    Future Appointments   Date Time Provider Leo Garcia   4/15/2021  2:30 PM Leslie Masters, PT [facility]   4/19/2021  9:30 AM Leslie Masters, PT [facility]   4/22/2021  2:30 PM Garwalter Ashbyzel, PT [facility]   4/26/2021  9:30 AM Garwalter Pretdelano, PT [facility]   4/29/2021  2:30 PM Garwalter Pretdelano, PT [facility]

## 2021-04-14 ENCOUNTER — APPOINTMENT (OUTPATIENT)
Dept: PHYSICAL THERAPY | Age: 76
End: 2021-04-14
Payer: MEDICARE

## 2021-04-15 ENCOUNTER — HOSPITAL ENCOUNTER (OUTPATIENT)
Dept: PHYSICAL THERAPY | Age: 76
Discharge: HOME | End: 2021-04-15
Payer: MEDICARE

## 2021-04-15 PROCEDURE — 97016 VASOPNEUMATIC DEVICE THERAPY: CPT

## 2021-04-15 PROCEDURE — 97110 THERAPEUTIC EXERCISES: CPT

## 2021-04-15 PROCEDURE — 97112 NEUROMUSCULAR REEDUCATION: CPT

## 2021-04-15 PROCEDURE — 97530 THERAPEUTIC ACTIVITIES: CPT

## 2021-04-15 NOTE — PROGRESS NOTES
PT DAILY TREATMENT NOTE    Patient Name: Joselyn Farley  Date:4/15/2021  : 1945  [x]  Patient  Verified  Payor: [employer] / Plan: [place] MEDICARE PART A & B / Product Type: Medicare /    In time:2:31  Out time: 3:34  Total Treatment Time (min): 63  Total Timed Codes (min): 53  1:1 Treatment Time ( [address] only): 48  Visit #: 21 of 22    Treatment Area: Right knee pain [M25.561]    SUBJECTIVE  Pain Level (0-10 scale): 1-2/10  Any medication changes, allergies to medications, adverse drug reactions, diagnosis change, or new procedure performed?: [x] No    [] Yes (see summary sheet for update)  Subjective functional status/changes:   [] No changes reported  Pt reports that yesterday she went to [employer] and [employer] and used the pushed cart and used cane to walk into the store. Reports that getting in and out of the car is a little bit better. Reports that she did notice that after walking and doing a lot both her legs swell.      OBJECTIVE:  Pt enters gym in no apparent distress, SPC    Modalities Rationale:     decrease edema, decrease inflammation and decrease pain to improve patient's ability to improve patient's ability to perform pain free ADL's                           min []????? Estim, type/location:                                                            []?????  att     []?????  unatt     []?????  w/US     []?????  w/ice    []?????  w/heat     min []?????  Mechanical Traction: type/lbs                    []?????  pro   []?????  sup   []?????  int   []?????  cont    []?????  before manual    []?????  after manual     min []?????  Ultrasound, settings/location:        min []?????  Iontophoresis w/ dexamethasone, location:                                                []?????  take home patch       []?????  in clinic   10  min [x]?????  Ice     []?????  Heat    location/position:  CP on left knee while VASO on the right knee   10 min [x]?????  Vasopneumatic Device, press/temp: Pt in long sit position with vera under R LE for elevation, low pressure     min []?????  Other:     []????? Skin assessment post-treatment (if applicable):    []?????  intact    []?????  redness- no adverse reaction     []?????redness  adverse reaction:            30 min Therapeutic Exercise:  [x]? ???? See flow sheet :   Rationale: increase ROM and increase strength to improve the patients ability to perform daily activities with decreased pain and symptom levels     13 min Therapeutic Activity:  [x]??????  See flow sheet : functional activities such as sit to stand- making sure feel the chair with allyn LE before sitting. Performed stairs in the gym, with one UE to mimic stairs at home. pt required cuing during stairs to perform heel strike, glut set as pt ascends stairs. Pt able to ascend and descend stairs in the gym reciprocally.    Rationale: increase strength, improve coordination and increase proprioception  to improve the patients ability to increase patient's ease with performing functional activities and decrease overall pain and symptoms.       10 min Neuromuscular Re-education:  [x]? ?  See flow sheet :   Rationale: improve coordination, improve balance and increase proprioception  to improve the patients ability to perform daily activities with decreased pain and symptom levels                                                                       With   [x]? ???? TE   [x]? ???? TA   []????? neuro   []????? other: Patient Education: [x]????? Review HEP    []????? Progressed/Changed HEP based on:   []????? positioning   []????? body mechanics   [x]????? transfers   [x]????? heat/ice application    [x]????? other:      Other Objective/Functional Measures:   Pt ambulated 2x around building which is a little over 500 ft indep without AD.  Pt req verbal cuing for arm swing, heel strike and toe off     Pain Level (0-10 scale) post treatment: 0/10     ASSESSMENT/Changes in Function: Patient tolerated therapy session well as there were no reports of increased pain. Pt noted to have more difficulty and requiring more UE support with SLS, only able to hold for 4 sec. Focused therapy session on ambulation as pt as able to ambulate around the building with good form, cuing listed above. Worked on cross marching to help improve core and gait. Pt is progressing with therapy as indicated by pt tolerating increase in exercise repetitions and resistance. Although showing progress patient would benefit from continuation of skilled physical therapy to address the remaining limitations.      Patient will continue to benefit from skilled PT services to  modify and progress therapeutic interventions, address functional mobility deficits, address ROM deficits, address strength deficits, analyze and address soft tissue restrictions, analyze and cue movement patterns and instruct in home and community integration to attain remaining goals. [x]? See Plan of Care  [x]? See progress note/recertification  []? See Discharge Summary         Progress towards goals / Updated goals:  Short Term Goals: To be accomplished in 4 weeks:                   [id number] will report compliance with HEP at least 1x/day to aid in rehabilitation program.                   Status at IE: Patient instructed in and provided written copy of initial Home Exercise Program.                   Current: Patient reports compliance 4/15/21                      Patient will demonstrate right knee AROM of 0 to 120 degrees to aid in completion of ADLs.                  Status at IE: right knee AROM 5 to 102 degrees.                         QFUAFUT:  7/10/10: GRHV flexion: 116 -had a bad weekend, will continue to address     Long Term Goals: To be accomplished in 8 weeks:                   Patient will increase strength to 5/5 throughout B LEs to aid in completion of ADLs.                    Status at IE: right knee ext 3+, flex 4-                   Current: 4/15/21: pt able to perform inc in mini squats                       Patient will report pain less than 1-2/10 average to aid in completion of ADLs.                  Status at IE: 5/10                   Current:1-2/10 at the beginning but 0/10 at the end of session  4/15/2021                      Patient will perform 5 or more pain free squats with good form/technique to aid in completion of ADLs.                  Status at IE: unable to perform one squat                   Current:  pt able to perform 2x10 squats without pain. MET 3/31/21                      Patient will demonstrate normal gait pattern on level and stairs without device for 1000 ft.                    Status at IE: antalgic gait with straight cane <200 ft, ambulates stairs one step at a time.                   Current: Pt ambulated 2x around building which is a little over 500 ft indep without AD. Pt req verbal cuing for arm swing, heel strike and toe off Progressing 4/15/21                        Patient will improve FOTO (an established functional score where 100 = no disability) to 71 points overall to demonstrate improvement in functional ability.                  Status at IE:54                              Current: 4/12/21 61 increased from last PN     Updated goals to be met in 4 more weeks:     1) Patient will demonstrate sit to stand form a 15 inch mat height with minimal UE support in order to improve ease with toilet transfers. Last PN: Pt can do 10 x 2 sets of sit to stand from 19.5 inches. Current 4/12/21: progressing patient is able to performed 10 times sit to stand at 16 inch with 6.7# medicine ball     2) Pt will have at least . 5 cm decrease in swelling in order to improve right knee flexion ROM.   Last PN: Swelling (cm): 2 inches below mid patella: R:43.5   Mid patella: R:49.0    2 inches above mid patella: R:51  Last PN: 3/22/21: Swelling (cm): 2 inches below mid patella: R:44.5   Mid patella: R:49.0    2 inches above mid patella: R:51     Current: partially met 3/31/21:  Swelling (cm): 2 inches below mid patella: R:43.0   Mid patella: R:46.5    2 inches above mid patella: R:51          3) Pt will demonstrate a right SLR without quad lag in order to safety progress off of SPC.   Last PN: right quad lag during SLR.   Current:4/12/21 pt able to perform SLR with no quad lag      PLAN  [x]  Upgrade activities as tolerated     [x]  Continue plan of care  [x]  Update interventions per flow sheet       []  Discharge due to:_  [x]  Other:_ re-assess next visit     Preston White, PT, DPT, [facility] 4/15/2021  2:35 PM    Future Appointments   Date Time Provider Leo Garcia   4/19/2021  9:30 AM JOVANNI Flores [facility]   4/22/2021  2:30 PM JOVANNI FloresHPSURY [facility]   4/26/2021  9:30 AM JOVANNI FloresHPSURY [facility]   4/29/2021  2:30 PM JOVANNI FloresHPSURY [facility]

## 2021-04-19 ENCOUNTER — HOSPITAL ENCOUNTER (OUTPATIENT)
Dept: PHYSICAL THERAPY | Age: 76
Discharge: HOME | End: 2021-04-19
Payer: MEDICARE

## 2021-04-19 PROCEDURE — 97110 THERAPEUTIC EXERCISES: CPT

## 2021-04-19 PROCEDURE — 97112 NEUROMUSCULAR REEDUCATION: CPT

## 2021-04-19 PROCEDURE — 97530 THERAPEUTIC ACTIVITIES: CPT

## 2021-04-19 NOTE — PROGRESS NOTES
PT DAILY TREATMENT NOTE    Patient Name: Pierre Arreaga  Date:2021  : 1945  [x]  Patient  Verified  [place]or: [place] / Plan: [place] MED[place]ARE PART A & B / Product Type: Medicare /    In time:9:30  Out time:10:32  Total Treatment Time (min): 62  Total Timed Codes (min): 54  1:1 Treatment Time ( [address] only): 47   Visit #: 22 of 22    Treatment Area: Right knee pain [M25.561]    SUBJECTIVE  Pain Level (0-10 scale): 1-2/10  Any medication changes, allergies to medications, adverse drug reactions, diagnosis change, or new procedure performed?: [x] No    [] Yes (see summary sheet for update)  Subjective functional status/changes:   [] No changes reported  Pt reports that she is more stiff than in pain. Reports that the worst pain in the last 48 hrs is 4/10, least 1-2/10. Reports that she is compliant with HEP at least once a day. Reports that she is able to walk around the grocery store and [employer], about 30 minutes minimal. Reports that stairs are going up with the good leg and down with the bad. Reports that she tries to do reciprocally on the first 4 but not all the way up. Reports that the bones in the leg feel weak. Reports that she is sleeping through the night sometimes. Reports that she is fine with bathing, cooking, and dressing. Reports that she is still having difficulty with going up the stairs and kneeling on the knee to clean bath tub. Reports that the medial knee doesn't hurt all the time, but certain exercise she does feel it, like with step ups and heel slides. Reports that getting up from chairs after sitting for a while is still difficult. Reports that the sit to stand exercise hurts her knee. Reports that the knee still swells on her. Reports that she feels that she needs to keep coming to therapy to strengthen the leg. Reports she hasn't been doing the SLR.      OBJECTIVE:  Pt enters gym in no apparent distress, SPC    Modalities Rationale:     Pt declined today    24 min Therapeutic Exercise: [x] See flow sheet : PROM knee extension   Rationale: increase ROM and increase strength to improve the patients ability to perform daily activities with decreased pain and symptom levels      20 min Therapeutic Activity:  []  See flow sheet :   Rationale: increase ROM, increase strength  to improve the patients ability to perform daily activities with decreased pain and symptom levels     10 min Neuromuscular Re-education:  []  See flow sheet :   Rationale: improve coordination, improve balance, and increase proprioception  to improve the patients ability to perform daily activities with decreased pain and symptom levels. With   [x] TE   [x] TA   [] neuro   [] other: Patient Education: [x] Review HEP    [] Progressed/Changed HEP based on:   [] positioning   [] body mechanics   [] transfers   [] heat/ice application    [x] other: add SLR back to HEP, perform LAQ 2x10, was only doing 1 set of 10 at home     Other Objective/Functional Measures:    right knee AROM extension: lacking 5/PROM 0, right knee flexion : 115  AAROM: flexion: 123 degrees     right knee flexion: 5/5, extension: 4+/5; left: knee flexion: 5/5, ext: 4+/5  Hip flex: L: 4/5 R: 4/5  Hip abd: L:4/5 R: -4/5  Hip ext: L: 4-/5 R: 4-/5     Balance: SLS: L: 4\" R: 6\"     Swelling (cm): 2 inches below mid patella: R:43.2   Mid patella: R:48.8   2 inches above mid patella: R:50.2  Pain Level (0-10 scale) post treatment: 1/10    ASSESSMENT/Changes in Function:  Patient is a 70year old female who presented to therapy with s/p right total knee on 1/11/21. Patient started outpatient physical therapy on 2/4/21 and this is patient's 22nd visit. Patient has progressed with skilled physical therapy as indicated by improved ROM and reports of decreased overall pain. Pt also with improvements in functional mobility. Pt able to perform stairs reciprocally at the therapy clinic but is fearful with performing at home.    Patient continues to complain of stiffness and weakness in the R LE. Pt noted to have dec in hip MMT which can be a contributing factor. Pt also with left LE weakness and pain and that can also be contributing to pts limitations. Pt with inc swelling at the mid patella and 2\" above the mid patella this therapy session. Although showing progress, patient continues to demonstrate decreased strength, swelling and pain/stiffness which limits ease with functional activities. Patient would benefit from continuation of skilled physical therapy to address the remaining limitations.      Patient tolerated therapy session well as there were no adverse reactions today. Pt is progressing with therapy as indicated by pt tolerating increase in exercise repetitions and resistance. Patient will continue to benefit from skilled PT services to  modify and progress therapeutic interventions, address functional mobility deficits, address ROM deficits, address strength deficits, analyze and address soft tissue restrictions, analyze and cue movement patterns, analyze and modify body mechanics/ergonomics, address imbalance/dizziness and instruct in home and community integration to attain remaining goals. [x]  See Plan of Care  [x]  See progress note/recertification  []  See Discharge Summary         Progress towards goals / Updated goals:  Short Term Goals: To be accomplished in 4 weeks:                   JEOVANY will report compliance with HEP at least 1x/day to aid in rehabilitation program.                   Status at IE: Patient instructed in and provided written copy of initial Home Exercise Program.                   Current 4/19/21: Patient reports compliance, reviewed and pt appeared to understand                      Patient will demonstrate right knee AROM of 0 to 120 degrees to aid in completion of ADLs.                  Status at IE: right knee AROM 5 to 102 degrees.                    Current:  4/19/21: right knee AROM extension: lacking 5/PROM 0, right knee flexion : 115  AAROM: flexion: 123 degrees- MET     Long Term Goals: To be accomplished in 8 weeks:                   Patient will increase strength to 5/5 throughout B LEs to aid in completion of ADLs.                  Status at IE: right knee ext 3+, flex 4-                   Current: 4/19/21: right knee flexion: 5/5, extension: 4+/5; left: knee flexion: 5/5, ext: 4+/5 Hip flex: L: 4/5 R: 4/5; Hip abd: L:4/5 R: -4/5; Hip ext: L: 4-/5 R: 4-/5                         Patient will report pain less than 1-2/10 average to aid in completion of ADLs.                  Status at IE: 5/10                   Current:1-2/10 at the beginning but 1/10 at the end of session  4/19/2021 MET                      Patient will perform 5 or more pain free squats with good form/technique to aid in completion of ADLs.                  Status at IE: unable to perform one squat                   Current:  pt able to perform 2x10 squats without pain. MET 3/31/21                      Patient will demonstrate normal gait pattern on level and stairs without device for 1000 ft.                    Status at IE: antalgic gait with straight cane <200 ft, ambulates stairs one step at a time.                   Current: 4/19/21 Pt has ambulated 2x around building which is a little over 500 ft indep without AD. Pt able to ascend and descend stairs in the gym reciprocally, but does not at home Part'l MET- to be met in 4 more weeks                        Patient will improve FOTO (an established functional score where 100 = no disability) to 71 points overall to demonstrate improvement in functional ability.                  Status at IE:54                   Current: 4/19/21 49 declined from last PN- to be met in 4 more weeks     Updated goals to be met in 4 more weeks:     1) Patient will demonstrate sit to stand form a 15 inch mat height with minimal UE support in order to improve ease with toilet transfers.   Last PN: Pt can do 10 x 2 sets of sit to stand from 19.5 inches. Current 4/15/21: progressing patient is able to perform 10 times sit to stand at 18 inch -to be met in 4 more weeks     2) Pt will have at least . 5 cm decrease in swelling in order to improve right knee flexion ROM. Last PN: Swelling (cm): 2 inches below mid patella: R:43.5   Mid patella: R:49.0    2 inches above mid patella: R:51  Last PN: 3/22/21: Swelling (cm): 2 inches below mid patella: R:44.5   Mid patella: R:49.0    2 inches above mid patella: R:51     Current: 4/19/21 : Swelling (cm): 2 inches below mid patella: R:43.2   Mid patella: R:48.8   2 inches above mid patella: R:50.2- part'l MET, to be MET in 4 more weeks      3) Pt will demonstrate a right SLR without quad lag in order to safety progress off of SPC.   Last PN: right quad lag during SLR.   Current: 4/19/21 pt able to perform SLR with no quad lag MET    PLAN  [x]  Upgrade activities as tolerated     [x]  Continue plan of care  [x]  Update interventions per flow sheet       []  Discharge due to:_  []  Other:_      Traci Collado, PT, DPT, CIMT 4/19/2021  8:55 AM    Future Appointments   Date Time Provider [place]   4/19/2021  9:30 AM JOVANNI Merino [facility]   4/22/2021  2:30 PM JOVANNI Merino [facility]   4/26/2021  9:30 AM Mana Damian PT JAIR [facility]   4/29/2021  2:30 PM JOVANNI Merino [facility]

## 2021-04-19 NOTE — PROGRESS NOTES
[facility] at the [address], [place], [address] [facility]  Phone: [phone]      Fax:  [phone]    Progress Note  Patient name: Mae Barbosa of Care: 2021   Referral Linda Pearce MD : [id number]               Medical Diagnosis: Right knee pain [M25.561]    Onset Date: right knee pain DOS: 21              Treatment Diagnosis: right knee pain   Prior Hospitalization: see medical history Provider#: [id number]   Medications: Verified on Patient summary List    Comorbidities: diabetes, HTN, OA.   Prior Level of Function: walking 5 miles 3x/wk, aerobics class    Visits from Start of Care: 22    Missed Visits: 0    Progress Towards Goals:   Short Term Goals: To be accomplished in 4 weeks:                   Patient will report compliance with HEP at least 1x/day to aid in rehabilitation program.                   Status at IE: Patient instructed in and provided written copy of initial Home Exercise Program.                   Current 21: Patient reports compliance, reviewed and pt appeared to understand                      Patient will demonstrate right knee AROM of 0 to 120 degrees to aid in completion of ADLs.                  Status at IE: right knee AROM 5 to 102 degrees.                  Current:  21: right knee AROM extension: lacking 5/PROM 0, right knee flexion : 115  AAROM: flexion: 123 degrees- MET     Long Term Goals: To be accomplished in 8 weeks:                   Patient will increase strength to 5/5 throughout B LEs to aid in completion of ADLs.                    Status at IE: right knee ext 3+, flex 4-                   Current: 21: right knee flexion: 5/5, extension: 4+/5; left: knee flexion: 5/5, ext: 4+/5 Hip flex: L: 4/5 R: 4/5; Hip abd: L:4/5 R: -4/5; Hip ext: L: 4-/5 R: 4-/5                          Patient will report pain less than 1-2/10 average to aid in completion of ADLs.                   Status at IE: 5/10                   Current:1-2/10 at the beginning but 1/10 at the end of session  4/19/2021 MET                      Patient will perform 5 or more pain free squats with good form/technique to aid in completion of ADLs.                  Status at IE: unable to perform one squat                   Current:  pt able to perform 2x10 squats without pain. MET 3/31/21                      Patient will demonstrate normal gait pattern on level and stairs without device for 1000 ft.                    Status at IE: antalgic gait with straight cane <200 ft, ambulates stairs one step at a time.                   Current: 4/19/21 Pt has ambulated 2x around building which is a little over 500 ft indep without AD. Pt able to ascend and descend stairs in the gym reciprocally, but does not at home Part'l MET- to be met in 4 more weeks                        Patient will improve FOTO (an established functional score where 100 = no disability) to 71 points overall to demonstrate improvement in functional ability.                  Status at IE:54                   Current: 4/19/21 49 declined from last PN- to be met in 4 more weeks     Updated goals to be met in 4 more weeks:     1) Patient will demonstrate sit to stand form a 15 inch mat height with minimal UE support in order to improve ease with toilet transfers. Last PN: Pt can do 10 x 2 sets of sit to stand from 19.5 inches. Current 4/15/21: progressing patient is able to perform 10 times sit to stand at 18 inch -to be met in 4 more weeks     2) Pt will have at least . 5 cm decrease in swelling in order to improve right knee flexion ROM.   Last PN: Swelling (cm): 2 inches below mid patella: R:43.5   Mid patella: R:49.0    2 inches above mid patella: R:51  Last PN: 3/22/21: Swelling (cm): 2 inches below mid patella: R:44.5   Mid patella: R:49.0    2 inches above mid patella: R:51     Current: 4/19/21 : Swelling (cm): 2 inches below mid patella: R:43.2   Mid patella: R:48.8   2 inches above mid patella: R:50.2- part'l MET, to be MET in 4 more weeks      3) Pt will demonstrate a right SLR without quad lag in order to safety progress off of SPC. Last PN: right quad lag during SLR.   Current: 4/19/21 pt able to perform SLR with no quad lag MET       Key Functional Changes: Patient is a 70year old female who presented to therapy with s/p right total knee on 1/11/21. Patient started outpatient physical therapy on 2/4/21 and this is patient's 22nd visit. Patient has progressed with skilled physical therapy as indicated by improved ROM and reports of decreased overall pain. Pt also with improvements in functional mobility. Pt able to perform stairs reciprocally at the therapy clinic but is fearful with performing at home. Patient continues to complain of stiffness and weakness in the R LE. Pt noted to have dec in hip MMT which can be a contributing factor. Pt also with left LE weakness and pain and that can also be contributing to pts limitations. Pt with inc swelling at the mid patella and 2\" above the mid patella this therapy session.      Although showing progress, patient continues to demonstrate decreased strength, swelling and pain/stiffness which limits ease with functional activities. Patient would benefit from continuation of skilled physical therapy to address the remaining limitations.      Patient tolerated therapy session well as there were no adverse reactions today.  Pt is progressing with therapy as indicated by pt tolerating increase in exercise repetitions and resistance.     Patient will continue to benefit from skilled PT services to  modify and progress therapeutic interventions, address functional mobility deficits, address ROM deficits, address strength deficits, analyze and address soft tissue restrictions, analyze and cue movement patterns, analyze and modify body mechanics/ergonomics, address imbalance/dizziness and instruct in home and community integration to attain remaining goals.     Updated Goals: to be achieved in 4 weeks:   Pt will meet unmet goals listed above in 4 more weeks    ASSESSMENT/RECOMMENDATIONS:  [x]Continue therapy per initial plan/protocol at a frequency of  2 x per week for 4 more weeks  []Continue therapy with the following recommended changes:_____________________      _____________________________________________________________________  []Discontinue therapy progressing towards or have reached established goals  []Discontinue therapy due to lack of appreciable progress towards goals  []Discontinue therapy due to lack of attendance or compliance  []Await Physician's recommendations/decisions regarding therapy  []Other:________________________________________________________________    Thank you for this referral.   Yara Elliott, PT, DPT, CIMT  4/19/2021 11:18 AM

## 2021-04-21 ENCOUNTER — APPOINTMENT (OUTPATIENT)
Dept: PHYSICAL THERAPY | Age: 76
End: 2021-04-21
Payer: MEDICARE

## 2021-04-22 ENCOUNTER — HOSPITAL ENCOUNTER (OUTPATIENT)
Dept: PHYSICAL THERAPY | Age: 76
Discharge: HOME | End: 2021-04-22
Payer: MEDICARE

## 2021-04-22 PROCEDURE — 97016 VASOPNEUMATIC DEVICE THERAPY: CPT

## 2021-04-22 PROCEDURE — 97112 NEUROMUSCULAR REEDUCATION: CPT

## 2021-04-22 PROCEDURE — 97110 THERAPEUTIC EXERCISES: CPT

## 2021-04-22 PROCEDURE — 97530 THERAPEUTIC ACTIVITIES: CPT

## 2021-04-22 NOTE — PROGRESS NOTES
PT DAILY TREATMENT NOTE    Patient Name: Karon Abdon  Date:2021  : 1945  [x]  Patient  Verified  Payor: Chalo Leo / Plan: [place] / Product Type: Medicare /    In time: 2:31 pm  Out time:3:33 pm   Total Treatment Time (min): 62  Total Timed Codes (min): 52  1:1 Treatment Time ( [address] only): 46   Visit #: 23 of 30    Treatment Area: Right knee pain [M25.561]    SUBJECTIVE  Pain Level (0-10 scale): 0  Any medication changes, allergies to medications, adverse drug reactions, diagnosis change, or new procedure performed?: [x] No    [] Yes (see summary sheet for update)  Subjective functional status/changes:   [] No changes reported  \"Just stiff. \"  Reported stiffness in morning, with standing after sitting and with colder temperatures. OBJECTIVE    Modalities Rationale:     decrease inflammation and decrease pain to improve patient's ability to perform daily activities with decreased pain and symptom levels     min [] Estim, type/location:                                      []  att     []  unatt     []  w/US     []  w/ice    []  w/heat    min []  Mechanical Traction: type/lbs                   []  pro   []  sup   []  int   []  cont    []  before manual    []  after manual    min []  Ultrasound, settings/location:      min []  Iontophoresis w/ dexamethasone, location:                                               []  take home patch       []  in clinic    min []  Ice     []  Heat    location/position:    10 min [x]  Vasopneumatic Device, press/temp:  To right knee  in supine with LE elevated    min []  Other:    [x] Skin assessment post-treatment (if applicable):    [x]  intact    []  redness- no adverse reaction     []redness  adverse reaction:          17 min Therapeutic Exercise:  [x] See flow sheet :   Rationale: increase ROM, increase strength, improve coordination and increase proprioception to improve the patients ability to perform daily activities with decreased pain and symptom levels      15 min Therapeutic Activity:  [x]  See flow sheet : included stairs - discussion and education regarding anterior weight shift, reference centers on feet and reach with opposite hand    Rationale: increase ROM, increase strength, improve coordination, improve balance and increase proprioception  to improve the patients ability to perform daily activities with decreased pain and symptom levels       15 min Neuromuscular Re-education:  [x]  See flow sheet :included walking on turf - dynamic balance    Rationale: increase ROM, increase strength, improve coordination, improve balance and increase proprioception  to improve the patients ability to perform daily activities with decreased pain and symptom levels    5  min Manual Therapy:  Inferior patellar glides in flexion and STM to superior portion of incision    The manual therapy interventions were performed at a separate and distinct time from the therapeutic activities interventions. Rationale: decrease pain, increase ROM, increase tissue extensibility and increase postural awareness to perform daily activities with decreased pain and symptom levels            With   [] TE   [] TA   [] neuro   [] other: Patient Education: [x] Review HEP    [] Progressed/Changed HEP based on:   [] positioning   [] body mechanics   [] transfers   [] heat/ice application    [] other:      Other Objective/Functional Measures:   Reported pulling across knee with bridges, decreased with cues to PPT and after inferior patellar glides     Unstable with turf march and reach but able to self correct with balance     Noted apprehension with stairs - reported feeling unstable - discussed with pt feeling foot on stair,proper weight bearing and reach with opposite hand - reported no pain or instability      Pain Level (0-10 scale) post treatment: 0    ASSESSMENT/Changes in Function:   Pt responded well to treatment this session especially stairs.  Challenged with 90-90s and proper grounding and hamstring facilitation. Patient will continue to benefit from skilled PT services to modify and progress therapeutic interventions, address functional mobility deficits, address ROM deficits, address strength deficits, analyze and address soft tissue restrictions, analyze and cue movement patterns, analyze and modify body mechanics/ergonomics, assess and modify postural abnormalities, address imbalance/dizziness and instruct in home and community integration to attain remaining goals. []  See Plan of Care  []  See progress note/recertification  []  See Discharge Summary         Progress towards goals / Updated goals:  Short Term Goals: To be accomplished in 4 weeks:                   Patient will report compliance with HEP at least 1x/day to aid in rehabilitation program.                   Status at IE: Patient instructed in and provided written copy of initial Home Exercise Program.                   Current 4/19/21: Patient reports compliance, reviewed and pt appeared to understand                      Patient will demonstrate right knee AROM of 0 to 120 degrees to aid in completion of ADLs.                  Status at IE: right knee AROM 5 to 102 degrees.                  Current:  4/19/21: right knee AROM extension: lacking 5/PROM 0, right knee flexion : 115  AAROM: flexion: 123 degrees- MET     Long Term Goals: To be accomplished in 8 weeks:                   Patient will increase strength to 5/5 throughout B LEs to aid in completion of ADLs.                  Status at IE: right knee ext 3+, flex 4-                   Last PN: 4/19/21: right knee flexion: 5/5, extension: 4+/5; left: knee flexion: 5/5, ext: 4+/5 Hip flex: L: 4/5 R: 4/5; Hip abd: L:4/5 R: -4/5; Hip ext: L: 4-/5 R: 4-/5                          Patient will report pain less than 1-2/10 average to aid in completion of ADLs.                    Status at IE: 5/10                   Last PNt:1-2/10 at the beginning but 1/10 at the end of session  4/19/2021 MET                      Patient will perform 5 or more pain free squats with good form/technique to aid in completion of ADLs.                  Status at IE: unable to perform one squat                   Current:  pt able to perform 2x10 squats without pain. MET 3/31/21                      Patient will demonstrate normal gait pattern on level and stairs without device for 1000 ft.                    Status at IE: antalgic gait with straight cane <200 ft, ambulates stairs one step at a time.                   Last PN: 4/19/21 Pt has ambulated 2x around building which is a little over 500 ft indep without AD. Pt able to ascend and descend stairs in the gym reciprocally, but does not at home Part'l MET- to be met in 4 more weeks                        Patient will improve FOTO (an established functional score where 100 = no disability) to 71 points overall to demonstrate improvement in functional ability.                  Status at IE:54                   Last PN: 4/19/21 49 declined from last PN- to be met in 4 more weeks     Updated goals to be met in 4 more weeks:     1) Patient will demonstrate sit to stand form a 15 inch mat height with minimal UE support in order to improve ease with toilet transfers. Last PN:  4/15/21: progressing patient is able to perform 10 times sit to stand at 18 inch -to be met in 4 more weeks     2) Pt will have at least . 5 cm decrease in swelling in order to improve right knee flexion ROM. Last PN: Swelling (cm): 2 inches below mid patella: R:43.5   Mid patella: R:49.0    2 inches above mid patella: R:51  Last PN: 4/19/21 : Swelling (cm): 2 inches below mid patella: R:43.2   Mid patella: R:48.8   2 inches above mid patella: R:50.2- part'l MET, to be MET in 4 more weeks      3) Pt will demonstrate a right SLR without quad lag in order to safety progress off of SPC.   Last PN: 4/19/21 pt able to perform SLR with no quad lag MET       PLAN  [x]  Upgrade activities as tolerated     []  Continue plan of care  []  Update interventions per flow sheet       []  Discharge due to:_  []  Other:_      Tierra Quintero, JOVANNI, DPT 4/22/2021  2:39 PM    Future Appointments   Date Time Provider Leo Garcia   4/26/2021  9:30 AM JOVANNI Tipt[facility]   4/29/2021  2:30 PM JOVANNI Tipton [facility]

## 2021-04-26 ENCOUNTER — HOSPITAL ENCOUNTER (OUTPATIENT)
Dept: PHYSICAL THERAPY | Age: 76
Discharge: HOME | End: 2021-04-26
Payer: MEDICARE

## 2021-04-26 PROCEDURE — 97016 VASOPNEUMATIC DEVICE THERAPY: CPT

## 2021-04-26 PROCEDURE — 97530 THERAPEUTIC ACTIVITIES: CPT

## 2021-04-26 PROCEDURE — 97110 THERAPEUTIC EXERCISES: CPT

## 2021-04-26 PROCEDURE — 97112 NEUROMUSCULAR REEDUCATION: CPT

## 2021-04-26 NOTE — PROGRESS NOTES
PT DAILY TREATMENT NOTE    Patient Name: Venkat Pierce  Date:2021  : 1945  [x]  Patient  Verified  Payor: [place] MEDICARE / Plan: [place] MEDICARE PART A & B / Product Type: Medicare /    In time:9:25  Out time:10:32  Total Treatment Time (min): 67  Total Timed Codes (min): 51   1:1 Treatment Time ( [address] only): 46   Visit #: 24 of 30    Treatment Area: Right knee pain [M25.561]    SUBJECTIVE  Pain Level (0-10 scale): 3/10  Any medication changes, allergies to medications, adverse drug reactions, diagnosis change, or new procedure performed?: [x] No    [] Yes (see summary sheet for update)  Subjective functional status/changes:   [] No changes reported  Pt reports that she felt fine after last therapy session. Reports that she feels that she is getting more confident with the stairs. Reports she left her cane in the car.      OBJECTIVE:  Pt enters gym in no apparent distress, no cane    Modalities Rationale:     decrease edema, decrease inflammation and decrease pain to improve patient's ability to improve patient's ability to perform pain free ADL's    min [] Estim, type/location:                                      []  att     []  unatt     []  w/US     []  w/ice    []  w/heat    min []  Mechanical Traction: type/lbs                   []  pro   []  sup   []  int   []  cont    []  before manual    []  after manual    min []  Ultrasound, settings/location:      min []  Iontophoresis w/ dexamethasone, location:                                               []  take home patch       []  in clinic    min []  Ice     []  Heat    location/position:    10 min [x]  Vasopneumatic Device, press/temp: VASO to right knee with pt in upright position with bolster under knee, 34 degrees, low pressure    min []  Other:    [] Skin assessment post-treatment (if applicable):    []  intact    []  redness- no adverse reaction     []redness  adverse reaction:            21 min Therapeutic Exercise:  [x] See flow sheet :   Rationale: increase ROM and increase strength to improve the patients ability to perform daily activities with decreased pain and symptom levels      15 min Therapeutic Activity:  [x]  See flow sheet :    Rationale: increase ROM, increase strength  to improve the patients ability to perform daily activities with decreased pain and symptom levels     15 min Neuromuscular Re-education:  [x]  See flow sheet :   Rationale: improve coordination, improve balance, and increase proprioception  to improve the patients ability to perform daily activities with decreased pain and symptom levels. With   [x] TE   [] TA   [] neuro   [] other: Patient Education: [x] Review HEP    [] Progressed/Changed HEP based on:   [] positioning   [] body mechanics   [] transfers   [] heat/ice application    [] other:      Other Objective/Functional Measures: pt able to perform step ups 6\" without UE support     Pain Level (0-10 scale) post treatment: 0/10, just stiffness    ASSESSMENT/Changes in Function: Patient tolerated therapy session well as there were no adverse reactions today. Pt requiring intermittent UE support with hurdles this therapy session. Pt was able to  tandem position on foam with little UE support, even appeared to stand SLS with less UE support. Pt is progressing with therapy as indicated by pt tolerating increase in exercise repetitions and resistance. Although showing progress patient would benefit from continuation of skilled physical therapy to address the remaining limitations. Patient will continue to benefit from skilled PT services to modify and progress therapeutic interventions, address functional mobility deficits, address ROM deficits, address strength deficits, analyze and address soft tissue restrictions, analyze and cue movement patterns, analyze and modify body mechanics/ergonomics, address imbalance/dizziness and instruct in home and community integration to attain remaining goals.      [x] See Plan of Care  [x]  See progress note/recertification  []  See Discharge Summary         Progress towards goals / Updated goals:  Short Term Goals: To be accomplished in 4 weeks:                   Patient will report compliance with HEP at least 1x/day to aid in rehabilitation program.                   Status at IE: Patient instructed in and provided written copy of initial Home Exercise Program.                   Current 4/26/21: Patient reports compliance, reviewed and pt appeared to understand                      Patient will demonstrate right knee AROM of 0 to 120 degrees to aid in completion of ADLs.                  Status at IE: right knee AROM 5 to 102 degrees.                  Current:  4/19/21: right knee AROM extension: lacking 5/PROM 0, right knee flexion : 115  AAROM: flexion: 123 degrees- MET     Long Term Goals: To be accomplished in 8 weeks:                   Patient will increase strength to 5/5 throughout B LEs to aid in completion of ADLs.                  Status at IE: right knee ext 3+, flex 4-                   Last PN: 4/19/21: right knee flexion: 5/5, extension: 4+/5; left: knee flexion: 5/5, ext: 4+/5 Hip flex: L: 4/5 R: 4/5; Hip abd: L:4/5 R: -4/5; Hip ext: L: 4-/5 R: 4-/5                          Patient will report pain less than 1-2/10 average to aid in completion of ADLs.                  Status at IE: 5/10                   Current: 3/10 at the beginning but 0/10 at the end of session  4/26/2021                       Patient will perform 5 or more pain free squats with good form/technique to aid in completion of ADLs.                  Status at IE: unable to perform one squat                   Current:  pt able to perform 2x10 squats without pain.  MET 4/26/21                      Patient will demonstrate normal gait pattern on level and stairs without device for 1000 ft.                    Status at IE: antalgic gait with straight cane <200 ft, ambulates stairs one step at a time.                   Last PN: 4/19/21 Pt has ambulated 2x around building which is a little over 500 ft indep without AD. Pt able to ascend and descend stairs in the gym reciprocally, but does not at home Part'l MET- to be met in 4 more weeks                        Patient will improve FOTO (an established functional score where 100 = no disability) to 71 points overall to demonstrate improvement in functional ability.                  Status at IE:54                   Last PN: 4/19/21 49 declined from last PN-      Updated goals to be met in 4 more weeks:     1) Patient will demonstrate sit to stand form a 15 inch mat height with minimal UE support in order to improve ease with toilet transfers. Current:  4/26/21: progressing patient is able to perform 10 times sit to stand from chair with MD-progressing -     2) Pt will have at least . 5 cm decrease in swelling in order to improve right knee flexion ROM. Last PN: Swelling (cm): 2 inches below mid patella: R:43.5   Mid patella: R:49.0    2 inches above mid patella: R:51  Last PN: 4/19/21 : Swelling (cm): 2 inches below mid patella: R:43.2   Mid patella: R:48.8   2 inches above mid patella: R:50.2- part'l MET,       3) Pt will demonstrate a right SLR without quad lag in order to safety progress off of SPC.   Last PN: 4/19/21 pt able to perform SLR with no quad lag MET    PLAN  [x]  Upgrade activities as tolerated     [x]  Continue plan of care  [x]  Update interventions per flow sheet       []  Discharge due to:_  []  Other:_      Glenda Bhatt, PT, DPT, [facility] 4/26/2021  9:30 AM    Future Appointments   Date Time Provider Leo Garcia   4/29/2021  2:30 PM Colby Gregorio, PT [facility]

## 2021-04-28 ENCOUNTER — APPOINTMENT (OUTPATIENT)
Dept: PHYSICAL THERAPY | Age: 76
End: 2021-04-28
Payer: MEDICARE

## 2021-04-29 ENCOUNTER — HOSPITAL ENCOUNTER (OUTPATIENT)
Dept: PHYSICAL THERAPY | Age: 76
Discharge: HOME | End: 2021-04-29
Payer: MEDICARE

## 2021-04-29 PROCEDURE — 97016 VASOPNEUMATIC DEVICE THERAPY: CPT

## 2021-04-29 PROCEDURE — 97110 THERAPEUTIC EXERCISES: CPT

## 2021-04-29 PROCEDURE — 97530 THERAPEUTIC ACTIVITIES: CPT

## 2021-04-29 PROCEDURE — 97112 NEUROMUSCULAR REEDUCATION: CPT

## 2021-04-29 NOTE — PROGRESS NOTES
[facility] at the [address], [place], [address] [facility]  Phone: [phone]      Fax:  [phone]    Continued Plan of Care/ Re-certification for Physical Therapy Services  Patient name: Mae Ledbetter of Care: 2021   Referral Van Robertson MD : [id number]               Medical Diagnosis: Right knee pain [M25.561]    Onset Date: right knee pain DOS: 21              Treatment Diagnosis: right knee pain   Prior Hospitalization: see medical history Provider#: [id number]   Medications: Verified on Patient summary List    Comorbidities: diabetes, HTN, OA.   Prior Level of Function: walking 5 miles 3x/wk, aerobics class    Visits from Start of Care: 25    Missed Visits: 0    The Plan of Care and following information is based on the patient's current status:    PROGRESS TOWARDS GOALS:  Short Term Goals: To be accomplished in 4 weeks:                   Patient will report compliance with HEP at least 1x/day to aid in rehabilitation program.                   Status at IE: Patient instructed in and provided written copy of initial Home Exercise Program.                   Current 21: Patient reports compliance, reviewed and pt appeared to understand                      Patient will demonstrate right knee AROM of 0 to 120 degrees to aid in completion of ADLs.                  Status at IE: right knee AROM 5 to 102 degrees.                  Current:  21: right knee AROM extension: lacking 5/PROM 0, right knee flexion : 115; AAROM (degrees): flexion: 121 degrees - MET     Long Term Goals: To be accomplished in 8 weeks:                   Patient will increase strength to 5/5 throughout B LEs to aid in completion of ADLs.                    Status at IE: right knee ext 3+, flex 4-                   Current: 21: right knee flexion: 5/5, extension: 4+/5; left: knee flexion: 5/5, ext: 4+/5 Hip flex: L: 4/5 R: 4/5; Hip abd: L:4/5 R: -4/5; Hip ext: L: 4-/5 R: 4-/5                          Patient will report pain less than 1-2/10 average to aid in completion of ADLs.                  Status at IE: 5/10                   Current: 0/10 at the beginning but 1/10 at the end of session  4/19/2021 MET                      Patient will perform 5 or more pain free squats with good form/technique to aid in completion of ADLs.                  Status at IE: unable to perform one squat                   Current:  pt able to perform 2x10 squats without pain. MET 3/31/21                      Patient will demonstrate normal gait pattern on level and stairs without device for 1000 ft.                    Status at IE: antalgic gait with straight cane <200 ft, ambulates stairs one step at a time.                   Current: 4/19/21 Pt has ambulated 2x around building which is a little over 500 ft indep without AD. Pt able to ascend and descend stairs in the gym reciprocally, but does not at home Part'l MET- to be met in 4 more weeks                        Patient will improve FOTO (an established functional score where 100 = no disability) to 71 points overall to demonstrate improvement in functional ability.                  Status at IE:54                   Current: 4/19/21 49 declined from last PN- to be met in 4 more weeks     Updated goals to be met in 2-4 more weeks:     1) Patient will demonstrate sit to stand form a 15 inch mat height with minimal UE support in order to improve ease with toilet transfers. Last PN: Pt can do 10 x 2 sets of sit to stand from 19.5 inches. Current 4/15/21: progressing patient is able to perform 10 times sit to stand at 18 inch -to be met in 4 more weeks     2) Pt will have at least . 5 cm decrease in swelling in order to improve right knee flexion ROM.   Last PN: Swelling (cm): 2 inches below mid patella: R:43.5   Mid patella: R:49.0    2 inches above mid patella: R:51  Last PN: 3/22/21: Swelling (cm): 2 inches below mid patella: R:44.5   Mid patella: R:49.0    2 inches above mid patella: R:51     Current: 4/29/21 : Swelling (cm): 2 inches below mid patella: R:43.9  Mid patella: R:49.2   2 inches above mid patella: R:51.5  - part'l MET, to be MET in 4 more weeks      3) Pt will demonstrate a right SLR without quad lag in order to safety progress off of SPC. Last PN: right quad lag during SLR.   Current: 4/19/21 pt able to perform SLR with no quad lag MET     4)Pt will be able to perform SLS for 15 seconds to assist with balancing on SLS to bathe, and to ambulate with good quality  Current: 4/29/21:  Balance: SLS: L: 13\" R: 9\"    Key functional changes: Pt reports that she thinks she is having a hard time, because this is the second surgery on the knee. Report still having difficulty with stairs. Reports that the bones feel weak. Reports that she is getting in and out of the car better. Reports that both knees still buckle on her. Problems/ barriers to goal attainment: none    Problem List: pain affecting function, decrease ROM, decrease strength, edema affecting function, impaired gait/ balance, decrease ADL/ functional abilitiies, decrease activity tolerance, decrease flexibility/ joint mobility and decrease transfer abilities    Treatment Plan: Therapeutic exercise, Therapeutic activities, Neuromuscular re-education, Physical agent/modality, Gait/balance training, Manual therapy, Patient education, Self Care training, Functional mobility training, Home safety training and Stair training     Patient Goal (s) has been updated and includes: \"To be able to go up the stairs comfortably, to get stronger\"     Goals for this certification period to be accomplished in 2-4 more  weeks:  Please see above    Frequency / Duration: Patient to be seen 1-2 times per week for 2-4 more  weeks:    Assessment / Prasanna Ballesteros is a 70year old female who presented to therapy with s/p right total knee on 1/11/21.  Patient started outpatient physical therapy on 2/4/21 and this is patient's 26th visit. Patient has progressed with skilled physical therapy as indicated by improved ROM, inc in SLS stance, and reports of decreased overall pain. Pt also with improvements in functional mobility as she is able to get in and out of the car better. Pt able to perform stairs reciprocally at the therapy clinic but is fearful with performing at home.   Patient continues to complain of stiffness, swelling, and weakness in the R LE. Pt noted to have dec in hip MMT which can be a contributing factor. Pt also with left LE weakness and pain and that can also be contributing to patients limitations. Pt with inc swelling at the mid patella and 2\" above the mid patella and 2\" below this therapy session. It is recommended that pt continues with skilled PT to further address impairments.      Patient tolerated therapy session well as there were no adverse reactions today. Pt is progressing with therapy as indicated by pt tolerating increase in exercise repetitions and resistance. Although showing progress patient would benefit from continuation of skilled physical therapy to address the remaining limitations.      Patient will continue to benefit from skilled PT services to  modify and progress therapeutic interventions, address functional mobility deficits, address ROM deficits, address strength deficits, analyze and address soft tissue restrictions, analyze and cue movement patterns, analyze and modify body mechanics/ergonomics, address imbalance/dizziness and instruct in home and community integration to attain remaining goals. Certification Period: 4/29/21-7/28/21    Jackie Lim, PT, DPT, CIMT 4/29/2021 2:26 PM    ________________________________________________________________________  I certify that the above Therapy Services are being furnished while the patient is under my care.  I agree with the treatment plan and certify that this therapy is necessary. [] I have read the above and request that my patient continue as recommended.   [] I have read the above report and request that my patient continue therapy with the following changes/special instructions: ______________________________________  [] I have read the above report and request that my patient be discharged from therapy    Physician's Signature:____________Date:_________TIME:________                                      Naun Dinh MD      ** Signature, Date and Time must be completed for valid certification **    Please sign and return to [facility] at the [address] [place], [address] [facility]  Phone: [phone]      Fax:  [phone]

## 2021-04-29 NOTE — PROGRESS NOTES
PT DAILY TREATMENT NOTE    Patient Name: Paris Lam  Date:2021  : 1945  [x]  Patient  Verified  [place]or: [place] / Plan: [place] MED[place] PART A & B / Product Type: Medicare /    In time:2:23  Out time: 3:33  Total Treatment Time (min): 70  Total Timed Codes (min): 60  1:1 Treatment Time ( [address] only): 60   Visit #: 26 of 30    Treatment Area: Right knee pain [M25.561]    SUBJECTIVE  Pain Level (0-10 scale): 0/10  Any medication changes, allergies to medications, adverse drug reactions, diagnosis change, or new procedure performed?: [x] No    [] Yes (see summary sheet for update)  Subjective functional status/changes:   [] No changes reported  Pt reports that she thinks she is having a hard time, because this is the second surgery on the knee. Report still having difficulty with stairs. Reports that the bones feel weak. Reports that she is getting in and out of the car better. Reports that both knees still buckle on her.      OBJECTIVE:  Pt enters gym in no apparent distress, no AD    Modalities Rationale:     decrease edema, decrease inflammation and decrease pain to improve patient's ability to improve patient's ability to perform pain free ADL's    min [] Estim, type/location:                                      []  att     []  unatt     []  w/US     []  w/ice    []  w/heat    min []  Mechanical Traction: type/lbs                   []  pro   []  sup   []  int   []  cont    []  before manual    []  after manual    min []  Ultrasound, settings/location:      min []  Iontophoresis w/ dexamethasone, location:                                               []  take home patch       []  in clinic    min []  Ice     []  Heat    location/position:    10 min [x]  Vasopneumatic Device, press/temp: Pt long sit with bolster under knee, 34 degrees, low pressure, right knee    min []  Other:    [] Skin assessment post-treatment (if applicable):    []  intact    []  redness- no adverse reaction     []redness  adverse reaction:            30 min Therapeutic Exercise:  [x] See flow sheet :   Rationale: increase ROM and increase strength to improve the patients ability to perform daily activities with decreased pain and symptom levels      10 min Therapeutic Activity:  [x]  See flow sheet : stairs, proper squat for sit to stand transfer,    Rationale: increase ROM, increase strength  to improve the patients ability to perform daily activities with decreased pain and symptom levels     10 min Neuromuscular Re-education:  [x]  See flow sheet :   Rationale: improve coordination, improve balance, and increase proprioception  to improve the patients ability to perform daily activities with decreased pain and symptom levels. NC min Manual Therapy:  MFR intramuscular to prox incision site   Rationale: increase tissue extensibility, decrease trigger points, and increase postural awareness to improve the patients ability to perform daily activities with decreased pain and symptom levels. The manual therapy interventions were performed at a separate and distinct time from the therapeutic activities interventions.            With   [x] TE   [] TA   [] neuro   [] other: Patient Education: [x] Review HEP    [] Progressed/Changed HEP based on:   [] positioning   [] body mechanics   [] transfers   [] heat/ice application    [] other:      Other Objective/Functional Measures:          right knee AROM extension: lacking 5/PROM 0, right knee flexion : 115  AAROM (degrees): flexion: 121 degrees      right knee flexion: 5/5, extension: 4+/5; left: knee flexion: 5/5, ext: 4+/5  Hip flex: L: 4/5 R: 4/5  Hip abd: L:4/5 R: -4/5  Hip ext: L: 4-/5 R: 4-/5      Balance: SLS: L: 13\" R: 9\"      Swelling (cm): 2 inches below mid patella: R:43.9  Mid patella: R:49.2   2 inches above mid patella: R:51.5    Squat: shifting to the left, narrow base of support, anterior tibial translation, better with demonstration, verbal and tactile cuing    Ambulation: Pt ambulates without AD independently around the gym, with Left>right toe out, dec lainey    Stairs: Pt requires UE supports to perform stairs reciprocally       Pain Level (0-10 scale) post treatment: 0/10    ASSESSMENT/Changes in Function:  Patient is a 70year old female who presented to therapy with s/p right total knee on 1/11/21. Patient started outpatient physical therapy on 2/4/21 and this is patient's 26th visit. Patient has progressed with skilled physical therapy as indicated by improved ROM, inc in SLS stance, and reports of decreased overall pain. Pt also with improvements in functional mobility as she is able to get in and out of the car better. Pt able to perform stairs reciprocally at the therapy clinic but is fearful with performing at home. Patient continues to complain of stiffness, swelling, and weakness in the R LE. Pt noted to have dec in hip MMT which can be a contributing factor. Pt also with left LE weakness and pain and that can also be contributing to patients limitations. Pt with inc swelling at the mid patella and 2\" above the mid patella and 2\" below this therapy session. It is recommended that pt continues with skilled PT to further address impairments. Patient tolerated therapy session well as there were no adverse reactions today. Pt is progressing with therapy as indicated by pt tolerating increase in exercise repetitions and resistance. Although showing progress patient would benefit from continuation of skilled physical therapy to address the remaining limitations.      Patient will continue to benefit from skilled PT services to  modify and progress therapeutic interventions, address functional mobility deficits, address ROM deficits, address strength deficits, analyze and address soft tissue restrictions, analyze and cue movement patterns, analyze and modify body mechanics/ergonomics, address imbalance/dizziness and instruct in home and community integration to attain remaining goals. [x]? See Plan of Care  [x]? See progress note/recertification  []? See Discharge Summary         Progress towards goals / Updated goals:  Short Term Goals: To be accomplished in 4 weeks:                   DXTAGHR will report compliance with HEP at least 1x/day to aid in rehabilitation program.                   Status at IE: Patient instructed in and provided written copy of initial Home Exercise Program.                   Current 4/29/21: Patient reports compliance, reviewed and pt appeared to understand                      Patient will demonstrate right knee AROM of 0 to 120 degrees to aid in completion of ADLs.                  Status at IE: right knee AROM 5 to 102 degrees.                  Current:  4/29/21: right knee AROM extension: lacking 5/PROM 0, right knee flexion : 115; AAROM (degrees): flexion: 121 degrees - MET     Long Term Goals: To be accomplished in 8 weeks:                   Patient will increase strength to 5/5 throughout B LEs to aid in completion of ADLs.                  Status at IE: right knee ext 3+, flex 4-                   Current: 4/29/21: right knee flexion: 5/5, extension: 4+/5; left: knee flexion: 5/5, ext: 4+/5 Hip flex: L: 4/5 R: 4/5; Hip abd: L:4/5 R: -4/5; Hip ext: L: 4-/5 R: 4-/5                          Patient will report pain less than 1-2/10 average to aid in completion of ADLs.                  Status at IE: 5/10                   Current: 0/10 at the beginning but 1/10 at the end of session  4/19/2021 MET                      Patient will perform 5 or more pain free squats with good form/technique to aid in completion of ADLs.                  Status at IE: unable to perform one squat                   Current:  pt able to perform 2x10 squats without pain.  MET 3/31/21                      Patient will demonstrate normal gait pattern on level and stairs without device for 1000 ft.                    Status at IE: antalgic gait with straight cane <200 ft, ambulates stairs one step at a time.                   Current: 4/19/21 Pt has ambulated 2x around building which is a little over 500 ft indep without AD. Pt able to ascend and descend stairs in the gym reciprocally, but does not at home Part'l MET- to be met in 4 more weeks                        Patient will improve FOTO (an established functional score where 100 = no disability) to 71 points overall to demonstrate improvement in functional ability.                  Status at IE:54                   Last PN: 4/19/21 49 declined from last PN- to be met in 4 more weeks     Updated goals to be met in 2-4 more weeks:     1) Patient will demonstrate sit to stand form a 15 inch mat height with minimal UE support in order to improve ease with toilet transfers. Last PN: Pt can do 10 x 2 sets of sit to stand from 19.5 inches. Current 4/15/21: progressing patient is able to perform 10 times sit to stand at 18 inch -to be met in 4 more weeks     2) Pt will have at least . 5 cm decrease in swelling in order to improve right knee flexion ROM. Last PN: Swelling (cm): 2 inches below mid patella: R:43.5   Mid patella: R:49.0    2 inches above mid patella: R:51  Last PN: 3/22/21: Swelling (cm): 2 inches below mid patella: R:44.5   Mid patella: R:49.0    2 inches above mid patella: R:51     Current: 4/29/21 : Swelling (cm): 2 inches below mid patella: R:43.9  Mid patella: R:49.2   2 inches above mid patella: R:51.5  - part'l MET, to be MET in 4 more weeks      3) Pt will demonstrate a right SLR without quad lag in order to safety progress off of SPC.   Last PN: right quad lag during SLR.   Current: 4/19/21 pt able to perform SLR with no quad lag MET    4)Pt will be able to perform SLS for 15 seconds to assist with balancing on SLS to bathe, and to ambulate with good quality  Current: 4/29/21:  Balance: SLS: L: 13\" R: 9\"    PLAN  [x]  Upgrade activities as tolerated     [x]  Continue plan of care  [x]  Update interventions per flow sheet       []  Discharge due to:_  []  Other:_      Excell Laura, PT, DPT, CIMT 4/29/2021  2:26 PM    Future Appointments   Date Time Provider [place]   4/29/2021  2:30 PM Edwina Sloop, PT [facility]   5/5/2021  1:45 PM Edwian Sloop, PT [facility]   5/7/2021  9:30 AM Edwina Sloop, PT [facility]   5/10/2021 11:45 AM Edwina Sloop, PT [facility]   5/13/2021  8:00 AM Edwina Sloop, PT [facility]   5/17/2021 11:45 AM Edwina Sloop, PT [facility]   5/20/2021  2:30 PM Edwina Sloop, PT [facility]   5/24/2021 11:00 AM Edwina Sloop, PT [facility]   5/27/2021  8:45 AM Edwina Sloop, PT [facility]

## 2021-05-05 ENCOUNTER — HOSPITAL ENCOUNTER (OUTPATIENT)
Dept: PHYSICAL THERAPY | Age: 76
Discharge: HOME | End: 2021-05-05
Payer: MEDICARE

## 2021-05-05 PROCEDURE — 97112 NEUROMUSCULAR REEDUCATION: CPT

## 2021-05-05 PROCEDURE — 97530 THERAPEUTIC ACTIVITIES: CPT

## 2021-05-05 PROCEDURE — 97110 THERAPEUTIC EXERCISES: CPT

## 2021-05-05 PROCEDURE — 97016 VASOPNEUMATIC DEVICE THERAPY: CPT

## 2021-05-05 NOTE — PROGRESS NOTES
PT DAILY TREATMENT NOTE    Patient Name: Pierre Arreaga  Date:2021  : 1945  [x]  Patient  Verified  Payor: Lolis Ramirezsandy / Plan: [place] / Product Type: Medicare /    In time:1:46  Out time:2:49  Total Treatment Time (min): 63 minutes  Total Timed Codes (min):  53  1:1 Treatment Time ( [address] only): 48   Visit #: 26 of 30    Treatment Area: Right knee pain [M25.561]    SUBJECTIVE  Pain Level (0-10 scale): 0/10  Any medication changes, allergies to medications, adverse drug reactions, diagnosis change, or new procedure performed?: [x] No    [] Yes (see summary sheet for update)  Subjective functional status/changes:   [] No changes reported  Pt reports that she is stiff. Reports she feels that it is going to rain. Reports that her bones are hurting. Reports that stairs are getting easier at home. Reports her balance is off. OBJECTIVE:  Pt enters gym in no apparent distress, no cane     Modalities Rationale:     decrease edema, decrease inflammation and decrease pain to improve patient's ability to improve patient's ability to perform pain free ADL's                 min []? Estim, type/location:                                                            []?  att     []?  unatt     []?  w/US     []?  w/ice    []?  w/heat     min []? Mechanical Traction: type/lbs                    []?  pro   []?  sup   []? int   []?  cont    []? before manual    []? after manual     min []? Ultrasound, settings/location:        min []? Iontophoresis w/ dexamethasone, location:                                                []?  take home patch       []? in clinic     min []? Ice     []? Heat    location/position:     10 min [x]? Vasopneumatic Device, press/temp: VASO to right knee with pt in upright position with bolster under knee, 34 degrees, low pressure     min []? Other:     []? Skin assessment post-treatment (if applicable):    []?  intact    []? redness- no adverse reaction     []? redness  adverse reaction:               23 min Therapeutic Exercise:  [x]? See flow sheet :   Rationale: increase ROM and increase strength to improve the patients ability to perform daily activities with decreased pain and symptom levels        15 min Therapeutic Activity:  [x]? See flow sheet :    Rationale: increase ROM, increase strength  to improve the patients ability to perform daily activities with decreased pain and symptom levels     15 min Neuromuscular Re-education:  [x]? See flow sheet :   Rationale: improve coordination, improve balance, and increase proprioception  to improve the patients ability to perform daily activities with decreased pain and symptom levels.        NC min Manual Therapy:  MFR strumming to right articularis genu, post distal femur grade III mobs to inc knee extension, tibial ER grade III mobs to inc knee ext   The manual therapy interventions were performed at a separate and distinct time from the therapeutic activities interventions. Rationale: increase ROM and increase tissue extensibility to perform daily activities with decreased pain and symptom levels                                                                  With   [x]? TE   []? TA   []? neuro   []? other: Patient Education: [x]? Review HEP    []? Progressed/Changed HEP based on:   []? positioning   []? body mechanics   []? transfers   []? heat/ice application    []? other:       Other Objective/Functional Measures: focused on descending stairs and pt had difficulty bilaterally; knee flexion: 121; knee ext: lacking 5/lacking 3 after manual               Pain Level (0-10 scale) post treatment: 0/10, just stiffness     ASSESSMENT/Changes in Function: Patient tolerated therapy session well as there were no adverse reactions today. Performed stairs this therapy session and performed exercise that focused on descending. Pt was fatigued with exercise bilaterally.  Performed dynamic warm up with knee hugs and pt had loss of balance but able to correct self. Pt had loss of balance with head turns as well. Pt is progressing with therapy as indicated by pt tolerating increase in exercise repetitions and resistance. Although showing progress patient would benefit from continuation of skilled physical therapy to address the remaining limitations.      Patient will continue to benefit from skilled PT services to modify and progress therapeutic interventions, address functional mobility deficits, address ROM deficits, address strength deficits, analyze and address soft tissue restrictions, analyze and cue movement patterns, analyze and modify body mechanics/ergonomics, address imbalance/dizziness and instruct in home and community integration to attain remaining goals. [x]? See Plan of Care  [x]? See progress note/recertification  []? See Discharge Summary         Progress towards goals / Updated goals:  Short Term Goals: To be accomplished in 4 weeks:                   Patient will report compliance with HEP at least 1x/day to aid in rehabilitation program.                   Status at IE: Patient instructed in and provided written copy of initial Home Exercise Program.                   Current 5/5/21: Patient reports compliance, advanced HEP clams to on her side, will need to provide handout                     Patient will demonstrate right knee AROM of 0 to 120 degrees to aid in completion of ADLs.                  Status at IE: right knee AROM 5 to 102 degrees.                  Current:  5/5/21: knee flexion: 121; knee ext: lacking 5/lacking 3 after manual     part'l MET     Long Term Goals: To be accomplished in 8 weeks:                   Patient will increase strength to 5/5 throughout B LEs to aid in completion of ADLs.                    Status at IE: right knee ext 3+, flex 4-                   Last PN: 4/19/21: right knee flexion: 5/5, extension: 4+/5; left: knee flexion: 5/5, ext: 4+/5 Hip flex: L: 4/5 R: 4/5; Hip abd: L:4/5 R: -4/5; Hip ext: L: 4-/5 R: 4-/5                          Patient will report pain less than 1-2/10 average to aid in completion of ADLs.                  Status at IE: 5/10                   Current: 0/10 at the beginning but 0/10 at the end of session  5/5/2021                       Patient will perform 5 or more pain free squats with good form/technique to aid in completion of ADLs.                  Status at IE: unable to perform one squat                   Current:  pt able to perform 2x10 squats without pain. MET 4/26/21                      Patient will demonstrate normal gait pattern on level and stairs without device for 1000 ft.                    Status at IE: antalgic gait with straight cane <200 ft, ambulates stairs one step at a time.                   Current: 5/5/21: focused on stairs and pt is able to perform reciprocally                        Patient will improve FOTO (an established functional score where 100 = no disability) to 71 points overall to demonstrate improvement in functional ability.                  Status at IE:54                   Last PN: 4/19/21 49 declined from last PN-      Updated goals to be met in 4 more weeks:     1) Patient will demonstrate sit to stand form a 15 inch mat height with minimal UE support in order to improve ease with toilet transfers. Current:  4/26/21: progressing patient is able to perform 10 times sit to stand from chair with MD-progressing -     2) Pt will have at least . 5 cm decrease in swelling in order to improve right knee flexion ROM.   Last PN: Swelling (cm): 2 inches below mid patella: R:43.5   Mid patella: R:49.0    2 inches above mid patella: R:51  Last PN: 4/19/21 : Swelling (cm): 2 inches below mid patella: R:43.2   Mid patella: R:48.8   2 inches above mid patella: R:50.2- part'l MET,     Current: 5/5/21: pt reported that both knees are swollen today    3) Pt will demonstrate a right SLR without quad lag in order to safety progress off of SPC.   Last PN: 4/19/21 pt able to perform SLR with no quad lag MET    PLAN  [x]  Upgrade activities as tolerated     [x]  Continue plan of care  [x]  Update interventions per flow sheet       []  Discharge due to:_  []  Other:_      Excell Laura, PT, DPT, [facility] 5/5/2021  1:55 PM    Future Appointments   Date Time Provider Leo Garcia   5/7/2021  9:30 AM Edwina Sloop, PT [facility]   5/10/2021 11:45 AM Edwina Sloop, PT [facility]   5/13/2021  8:00 AM Edwina Sloop, PT [facility]   5/17/2021 11:45 AM Edwina Sloop, PT [facility]   5/20/2021  2:30 PM Edwina Sloop, PT [facility]   5/24/2021 11:00 AM Edwina Sloop, PT [facility]   5/27/2021  8:45 AM Edwina Sloop, PT [facility]

## 2021-05-07 ENCOUNTER — HOSPITAL ENCOUNTER (OUTPATIENT)
Dept: PHYSICAL THERAPY | Age: 76
Discharge: HOME | End: 2021-05-07
Payer: MEDICARE

## 2021-05-07 PROCEDURE — 97016 VASOPNEUMATIC DEVICE THERAPY: CPT

## 2021-05-07 PROCEDURE — 97112 NEUROMUSCULAR REEDUCATION: CPT

## 2021-05-07 PROCEDURE — 97110 THERAPEUTIC EXERCISES: CPT

## 2021-05-07 NOTE — PROGRESS NOTES
PT DAILY TREATMENT NOTE    Patient Name: Holly Shipley  Date:2021  : 1945  [x]  Patient  Verified  Payor: Glenys Dress / Plan: [place] MEDICARE PART A & B / Product Type: Medicare /    In time: 9:32 Out time:10:31  Total Treatment Time (min): 59  Total Timed Codes (min): 49  1:1 Treatment Time ([facility] only): 34 (15 minute overlap)  Visit #:  of 30    Treatment Area: Right knee pain [M25.561]    SUBJECTIVE  Pain Level (0-10 scale): 0/10  Any medication changes, allergies to medications, adverse drug reactions, diagnosis change, or new procedure performed?: [x] No    [] Yes (see summary sheet for update)  Subjective functional status/changes:   [] No changes reported  Pt reports that she practices balancing in her rolling walker. Reports that she is stiff. Reports that         OBJECTIVE:  Pt enters gym in no apparent distress, no cane     Modalities Rationale:     decrease edema, decrease inflammation and decrease pain to improve patient's ability to improve patient's ability to perform pain free ADL's                           min []? ? Estim, type/location:                                                            []? ?  att     []? ?  unatt     []? ?  w/US     []? ?  w/ice    []? ?  w/heat     min []? ?  Mechanical Traction: type/lbs                    []? ?  pro   []? ?  sup   []? ?  int   []? ?  cont    []? ?  before manual    []? ?  after manual     min []? ?  Ultrasound, settings/location:        min []? ?  Iontophoresis w/ dexamethasone, location:                                                []? ?  take home patch       []? ?[address] [place] [address]     min []? ?  Ice     []? ?  Heat    location/position:     10 min [x]? ?  Vasopneumatic Device, press/temp: VASO to right knee with pt in upright position with bolster under knee, 34 degrees, low pressure     min []? ?  Other:     []? ? Skin assessment post-treatment (if applicable):    []? ?  intact    []? ?  redness- no adverse reaction     []? ?redness  adverse reaction:               19 min Therapeutic Exercise:  [x]? ? See flow sheet :   Rationale: increase ROM and increase strength to improve the patients ability to perform daily activities with decreased pain and symptom levels        15 min Therapeutic Activity:  [x]? ?  See flow sheet :    Rationale: increase ROM, increase strength  to improve the patients ability to perform daily activities with decreased pain and symptom levels     15 min Neuromuscular Re-education:  [x]? ?  See flow sheet :   Rationale: improve coordination, improve balance, and increase proprioception  to improve the patients ability to perform daily activities with decreased pain and symptom levels.                                                                    With   [x]? ? TE   []?? TA   []?? neuro   []?? other: Patient Education: [x]? ? Review HEP    []? ? Progressed/Changed HEP based on:   []?? positioning   []? ? body mechanics   []? ? transfers   []? ? heat/ice application    [x]? ? other: When performing balance       Other Objective/Functional Measures:  n/a     Pain Level (0-10 scale) post treatment: 0/10, just stiffness     ASSESSMENT/Changes in Function: Patient tolerated therapy session well as there were no adverse reactions today. Pt did better with working on descending stairs exercise. Pt was fatigued at the end of therapy session. . Performed dynamic warm up with knee hugs and marches with a twist and pt had loss of balance but able to correct self. Pt had a hard time with coordination of UE arm swing and performing head turns. Added karioke to dynamic balance and pt had slight loss of balance. Pt continues to require verbal cuing on proper gait, but gait is improving. Pt is progressing with therapy as indicated by pt tolerating increase in exercise repetitions and resistance.  Although showing progress patient would benefit from continuation of skilled physical therapy to address the remaining limitations.      Patient will continue to benefit from skilled PT services to modify and progress therapeutic interventions, address functional mobility deficits, address ROM deficits, address strength deficits, analyze and address soft tissue restrictions, analyze and cue movement patterns, analyze and modify body mechanics/ergonomics, address imbalance/dizziness and instruct in home and community integration to attain remaining goals.     [x]? ?  See Plan of Care  [x]? ?  See progress note/recertification  []? ?  See Discharge Summary         Progress towards goals / Updated goals:  Short Term Goals: To be accomplished in 4 weeks:                   Patient will report compliance with HEP at least 1x/day to aid in rehabilitation program.                   Status at IE: Patient instructed in and provided written copy of initial Home Exercise Program.                   Current 5/721: Patient reports compliance                      Patient will demonstrate right knee AROM of 0 to 120 degrees to aid in completion of ADLs.                  Status at IE: right knee AROM 5 to 102 degrees.                  Current:  5/5/21: knee flexion: 121; knee ext: lacking 5/lacking 3 after manual     part'l MET     Long Term Goals: To be accomplished in 8 weeks:                   Patient will increase strength to 5/5 throughout B LEs to aid in completion of ADLs.                  Status at IE: right knee ext 3+, flex 4-                   Last PN: 4/19/21: right knee flexion: 5/5, extension: 4+/5; left: knee flexion: 5/5, ext: 4+/5 Hip flex: L: 4/5 R: 4/5; Hip abd: L:4/5 R: -4/5; Hip ext: L: 4-/5 R: 4-/5                          Patient will report pain less than 1-2/10 average to aid in completion of ADLs.                  Status at IE: 5/10                   Current: 0/10 at the beginning but 0/10 at the end of session  5/7/2021                       Patient will perform 5 or more pain free squats with good form/technique to aid in completion of ADLs.                    Status at IE: unable to perform one squat                   Current:  pt able to perform 2x10 squats without pain. MET 4/26/21                      Patient will demonstrate normal gait pattern on level and stairs without device for 1000 ft.                    Status at IE: antalgic gait with straight cane <200 ft, ambulates stairs one step at a time.                   Current: 5/7/21: focused on stairs and pt is able to perform reciprocally                        Patient will improve FOTO (an established functional score where 100 = no disability) to 71 points overall to demonstrate improvement in functional ability.                  Status at IE:54                   Last PN: 4/19/21 49 declined from last PN-      Updated goals to be met in 4 more weeks:     1) Patient will demonstrate sit to stand form a 15 inch mat height with minimal UE support in order to improve ease with toilet transfers. Current:  4/26/21: progressing patient is able to perform 10 times sit to stand from chair with MD-progressing -     2) Pt will have at least . 5 cm decrease in swelling in order to improve right knee flexion ROM. Last PN: Swelling (cm): 2 inches below mid patella: R:43.5   Mid patella: R:49.0    2 inches above mid patella: R:51  Last PN: 4/19/21 : Swelling (cm): 2 inches below mid patella: R:43.2   Mid patella: R:48.8   2 inches above mid patella: R:50.2- part'l MET,     Current: 5/5/21: pt reported that both knees are swollen today     3) Pt will demonstrate a right SLR without quad lag in order to safety progress off of SPC.   Last PN: 4/19/21 pt able to perform SLR with no quad lag MET       PLAN  [x]  Upgrade activities as tolerated     [x]  Continue plan of care  [x]  Update interventions per flow sheet       []  Discharge due to:_  []  Other:_      Farrah Torres, PT, DPT, [facility] 5/7/2021  9:50 AM    Future Appointments   Date Time Provider Leo Garcia   5/10/2021 11:45 AM Bipin Sanches, PT [facility]   5/13/2021  8:00 AM Rachelle Archer Farhana DAAM [facility]   5/17/2021 11:45 AM JOVANNI Muse [facility]   5/20/2021  2:30 PM JOVANNI Muse [facility]   5/24/2021 11:00 AM JOVANNI Muse [facility]   5/27/2021  8:45 AM JOVANNI Muse [facility]

## 2021-05-10 ENCOUNTER — HOSPITAL ENCOUNTER (OUTPATIENT)
Dept: PHYSICAL THERAPY | Age: 76
Discharge: HOME | End: 2021-05-10
Payer: MEDICARE

## 2021-05-10 PROCEDURE — 97016 VASOPNEUMATIC DEVICE THERAPY: CPT

## 2021-05-10 PROCEDURE — 97112 NEUROMUSCULAR REEDUCATION: CPT

## 2021-05-10 PROCEDURE — 97110 THERAPEUTIC EXERCISES: CPT

## 2021-05-10 NOTE — PROGRESS NOTES
PT DAILY TREATMENT NOTE    Patient Name: Pierre Arreaga  Date:5/10/2021  : 1945  [x]  Patient  Verified  Payor: Lolis Tarango / Plan: [place] / Product Type: Medicare /    In time:11:43  Out time:12:45  Total Treatment Time (min): 62  Total Timed Codes (min): 52  1:1 Treatment Time ([facility] only): 42 (10 minutes overlap)   Visit #:  30    Treatment Area: Right knee pain [M25.561]    SUBJECTIVE  Pain Level (0-10 scale): 3/10  Any medication changes, allergies to medications, adverse drug reactions, diagnosis change, or new procedure performed?: [x] No    [] Yes (see summary sheet for update)  Subjective functional status/changes:   [] No changes reported  Pt reports that she is achy because it is raining. OBJECTIVE:  Pt enters gym in no apparent distress, no AD    Modalities Rationale:     decrease edema, decrease inflammation and decrease pain to improve patient's ability to improve patient's ability to perform pain free ADL's                           min []? ?? Estim, type/location:                                                            []? ??  att     []? ??  unatt     []? ??  w/US     []? ??  w/ice    []? ??  w/heat     min []? ??  Mechanical Traction: type/lbs                    []? ??  pro   []? ??  sup   []? ??  int   []? ??  cont    []? ??  before manual    []? ??  after manual     min []? ??  Ultrasound, settings/location:        min []? ??  Iontophoresis w/ dexamethasone, location:                                                []? ??  take home patch       []? ??  in clinic     min []? ??  Ice     []? ??  Heat    location/position:     10 min [x]? ??  Vasopneumatic Device, press/temp: VASO to right knee with pt in upright position with bolster under knee, 34 degrees, low pressure     min []? ??  Other:     []??? Skin assessment post-treatment (if applicable):    []???  intact    []? ??  redness- no adverse reaction     []? ??redness  adverse reaction:               27 min Therapeutic Exercise:  [x]??? See flow sheet :   Rationale: increase ROM and increase strength to improve the patients ability to perform daily activities with decreased pain and symptom levels        15 min Neuromuscular Re-education:  [x]? ??  See flow sheet :   Rationale: improve coordination, improve balance, and increase proprioception  to improve the patients ability to perform daily activities with decreased pain and symptom levels.                                                                    With   [x]? ?? TE   []??? TA   []??? neuro   []? ?? other: Patient Education: [x]??? Review HEP    []? ?? Progressed/Changed HEP based on:   []??? positioning   []? ?? body mechanics   []? ?? transfers   []? ?? heat/ice application    []? ?? other:      Other Objective/Functional Measures:  Added glut max ex and pt had a difficult time.     Pain Level (0-10 scale) post treatment: 0/10,      ASSESSMENT/Changes in Function: Patient tolerated therapy session well as there were no adverse reactions today.  Pt with inc aching maybe due to the rainy weather. Held on dynamic gait this therapy session and focused on strengthening. Added glut max ex and pt had a hard time keeping PPT and performing task. Therapist providing verbal and tactile cuing for assistance. Pt did have dec in pain at the end of therapy session. Pt is progressing with therapy as indicated by pt tolerating increase in exercise repetitions and resistance.  Although showing progress patient would benefit from continuation of skilled physical therapy to address the remaining limitations.      Patient will continue to benefit from skilled PT services to modify and progress therapeutic interventions, address functional mobility deficits, address ROM deficits, address strength deficits, analyze and address soft tissue restrictions, analyze and cue movement patterns, analyze and modify body mechanics/ergonomics, address imbalance/dizziness and instruct in home and community integration to attain remaining goals.     [x]? ??  See Plan of Care  [x]? ??  See progress note/recertification  []? ??  See Discharge Summary         Progress towards goals / Updated goals:  Short Term Goals: To be accomplished in 4 weeks:                   QZIFSWB will report compliance with HEP at least 1x/day to aid in rehabilitation program.                   Status at IE: Patient instructed in and provided written copy of initial Home Exercise Program.                   Current 5/10/21: Patient reports compliance                      Patient will demonstrate right knee AROM of 0 to 120 degrees to aid in completion of ADLs.                  Status at IE: right knee AROM 5 to 102 degrees.                  last taken:  5/5/21: knee flexion: 121; knee ext: lacking 5/lacking 3 after manual     part'l MET     Long Term Goals: To be accomplished in 8 weeks:                   Patient will increase strength to 5/5 throughout B LEs to aid in completion of ADLs.                  Status at IE: right knee ext 3+, flex 4-                   Last PN: 4/19/21: right knee flexion: 5/5, extension: 4+/5; left: knee flexion: 5/5, ext: 4+/5 Hip flex: L: 4/5 R: 4/5; Hip abd: L:4/5 R: -4/5; Hip ext: L: 4-/5 R: 4-/5                          Patient will report pain less than 1-2/10 average to aid in completion of ADLs.                  Status at IE: 5/10                   Current: 3/10 at the beginning but 0/10 at the end of session  5/10/2021                       Patient will perform 5 or more pain free squats with good form/technique to aid in completion of ADLs.                    Status at IE: unable to perform one squat                   Current: 5/10/21 pt with inc pain this therapy session, so unable to perform mini squats                      Patient will demonstrate normal gait pattern on level and stairs without device for 1000 ft.                    Status at IE: antalgic gait with straight cane <200 ft, ambulates stairs one step at a time.                   Current: 5/7/21: focused on stairs and pt is able to perform reciprocally                        Patient will improve FOTO (an established functional score where 100 = no disability) to 71 points overall to demonstrate improvement in functional ability.                  Status at IE:54                   Current: 5/10/21 60 Progressing-      Updated goals to be met in 4 more weeks:     1) Patient will demonstrate sit to stand form a 15 inch mat height with minimal UE support in order to improve ease with toilet transfers. Current:  4/26/21: progressing patient is able to perform 10 times sit to stand from chair with MD-progressing -     2) Pt will have at least . 5 cm decrease in swelling in order to improve right knee flexion ROM. Last PN: Swelling (cm): 2 inches below mid patella: R:43.5   Mid patella: R:49.0    2 inches above mid patella: R:51  Last PN: 4/19/21 : Swelling (cm): 2 inches below mid patella: R:43.2   Mid patella: R:48.8   2 inches above mid patella: R:50.2- part'l MET,     Current: 5/10/21: pt reported that both knees are swollen today     3) Pt will demonstrate a right SLR without quad lag in order to safety progress off of SPC.   Last PN: 4/19/21 pt able to perform SLR with no quad lag MET         PLAN  [x]  Upgrade activities as tolerated     [x]  Continue plan of care  [x]  Update interventions per flow sheet       []  Discharge due to:_  []  Other:_      Alex Howard PT, DPT, [facility] 5/10/2021  11:58 AM    Future Appointments   Date Time Provider [place]   5/13/2021  8:00 AM Ksenia Chan PT [facility]   5/17/2021 11:45 AM Ksenia Chan PT [facility]   5/20/2021  2:30 PM Ksenia Chan PT [facility]   5/24/2021 11:00 AM Ksenia Chan PT [facility]   5/27/2021  8:45 AM Ksenia Chan PT [facility]

## 2021-05-13 ENCOUNTER — HOSPITAL ENCOUNTER (OUTPATIENT)
Dept: PHYSICAL THERAPY | Age: 76
Discharge: HOME | End: 2021-05-13
Payer: MEDICARE

## 2021-05-13 PROCEDURE — 97110 THERAPEUTIC EXERCISES: CPT

## 2021-05-13 PROCEDURE — 97112 NEUROMUSCULAR REEDUCATION: CPT

## 2021-05-13 PROCEDURE — 97016 VASOPNEUMATIC DEVICE THERAPY: CPT

## 2021-05-13 NOTE — PROGRESS NOTES
PT DAILY TREATMENT NOTE    Patient Name: Sadia De La Rosa  Date:2021  : 1945  [x]  Patient  Verified  Payor: [place] MEDICARE / Plan: [place] MEDICARE PART A & B / Product Type: Medicare /    In time:8:00  Out time: 9:03  Total Treatment Time (min): 63  Total Timed Codes (min): 53  1:1 Treatment Time ( [address] only): 48   Visit #: 29 of 30    Treatment Area: Right knee pain [M25.561]    SUBJECTIVE  Pain Level (0-10 scale): 0/10  Any medication changes, allergies to medications, adverse drug reactions, diagnosis change, or new procedure performed?: [x] No    [] Yes (see summary sheet for update)  Subjective functional status/changes:   [] No changes reported  Pt reports that her bones hurt. Reports that she felt fine after last therapy session. Reports that she is working on walking. Reports that stairs are doing better. Reports she is walking about 1 mile. OBJECTIVE:  Pt enters gym in no apparent distress    Modalities Rationale:     decrease edema, decrease inflammation and decrease pain to improve patient's ability to improve patient's ability to perform pain free ADL's                           min []???? Estim, type/location:                                                            []????  att     []????  unatt     []????  w/US     []????  w/ice    []????  w/heat     min []????  Mechanical Traction: type/lbs                    []????  pro   []? ???  sup   []? ???  int   []? ???  cont    []????  before manual    []????  after manual     min []????  Ultrasound, settings/location:        min []????  Iontophoresis w/ dexamethasone, location:                                                []????  take home patch       []????  in clinic     min []????  Ice     []????  Heat    location/position:     10 min [x]????  Vasopneumatic Device, press/temp: VASO to right knee with pt in upright position with bolster under knee, 34 degrees, low pressure     min []????  Other:     []???? Skin assessment post-treatment (if applicable):    []????  intact    []????  redness- no adverse reaction     []????redness  adverse reaction:               38 min Therapeutic Exercise:  [x]? ??? See flow sheet :   Rationale: increase ROM and increase strength to improve the patients ability to perform daily activities with decreased pain and symptom levels        15 min Neuromuscular Re-education:  [x]? ???  See flow sheet :   Rationale: improve coordination, improve balance, and increase proprioception  to improve the patients ability to perform daily activities with decreased pain and symptom levels.                                                                    With   [x]? ??? TE   []???? TA   []???? neuro   []???? other: Patient Education: [x]???? Review HEP    []???? Progressed/Changed HEP based on:   []???? positioning   []???? body mechanics   []???? transfers   []???? heat/ice application    [x]???? other: Provided updated HEP program with handout, pt appeared to understand      Other Objective/Functional Measures: Pt required tactile cuing for glut max ex and pt had a difficult time keeping PPT.     Pain Level (0-10 scale) post treatment: 0/10,      ASSESSMENT/Changes in Function: Patient tolerated therapy session well as there were no adverse reactions today.  Pt is progressing towards discharge. Provided extensive HEP handout and pt appeared to understand. Pt did have loss of balance with hip hug and required intermittent UE support with karioke. Pt is progressing with therapy as indicated by pt tolerating increase in exercise repetitions and resistance.  Although showing progress patient would benefit from continuation of skilled physical therapy to address the remaining limitations.      Patient will continue to benefit from skilled PT services to modify and progress therapeutic interventions, address functional mobility deficits, address ROM deficits, address strength deficits, analyze and address soft tissue restrictions, analyze and cue movement patterns, analyze and modify body mechanics/ergonomics, address imbalance/dizziness and instruct in home and community integration to attain remaining goals.     [x]? ???  See Plan of Care  [x]????  See progress note/recertification  []????  See Discharge Summary         Progress towards goals / Updated goals:  Short Term Goals: To be accomplished in 4 weeks:                   Patient will report compliance with HEP at least 1x/day to aid in rehabilitation program.                   Status at IE: Patient instructed in and provided written copy of initial Home Exercise Program.                   Current 5/13/21: Patient reports compliance, gave updated program                      Patient will demonstrate right knee AROM of 0 to 120 degrees to aid in completion of ADLs.                  Status at IE: right knee AROM 5 to 102 degrees.                  last taken:  5/5/21: knee flexion: 121; knee ext: lacking 5/lacking 3 after manual     part'l MET     Long Term Goals: To be accomplished in 8 weeks:                   Patient will increase strength to 5/5 throughout B LEs to aid in completion of ADLs.                  Status at IE: right knee ext 3+, flex 4-                   Last PN: 4/19/21: right knee flexion: 5/5, extension: 4+/5; left: knee flexion: 5/5, ext: 4+/5 Hip flex: L: 4/5 R: 4/5; Hip abd: L:4/5 R: -4/5; Hip ext: L: 4-/5 R: 4-/5                          Patient will report pain less than 1-2/10 average to aid in completion of ADLs.                  Status at IE: 5/10                   Current: 0/10 at the beginning but 0/10 at the end of session  5/13/2021                       Patient will perform 5 or more pain free squats with good form/technique to aid in completion of ADLs.                    Status at IE: unable to perform one squat                   Current: 5/13/21 performed sit to stands this therapy session                      Patient will demonstrate normal gait pattern on level and stairs without device for 1000 ft.                    Status at IE: antalgic gait with straight cane <200 ft, ambulates stairs one step at a time.                   Current: 5/13/21: focused on stairs and pt is able to perform reciprocally. Pt reports able to walk 1 mile                        Patient will improve FOTO (an established functional score where 100 = no disability) to 71 points overall to demonstrate improvement in functional ability.                  Status at IE:54                   Current: 5/10/21 60 Progressing-      Updated goals to be met in 4 more weeks:     1) Patient will demonstrate sit to stand form a 15 inch mat height with minimal UE support in order to improve ease with toilet transfers. Current:  5/13/21: progressing patient is able to perform 10 times sit to stand from chair without UE     2) Pt will have at least . 5 cm decrease in swelling in order to improve right knee flexion ROM. Last PN: Swelling (cm): 2 inches below mid patella: R:43.5   Mid patella: R:49.0    2 inches above mid patella: R:51  Last PN: 4/19/21 : Swelling (cm): 2 inches below mid patella: R:43.2   Mid patella: R:48.8   2 inches above mid patella: R:50.2- part'l MET,     Current: 5/10/21: pt reported that both knees are swollen today     3) Pt will demonstrate a right SLR without quad lag in order to safety progress off of SPC.   Last PN: 4/19/21 pt able to perform SLR with no quad lag MET      PLAN  [x]  Upgrade activities as tolerated     [x]  Continue plan of care  []  Update interventions per flow sheet       []  Discharge due to:_  [x]  Other:_ 1 more visit then possible d/c     Kaylynn Canales PT, DPT, [facility] 5/13/2021  8:03 AM    Future Appointments   Date Time Provider [place]   5/17/2021 11:45 AM Uvaldo Krabbe, PT [facility]   5/20/2021  2:30 PM Uvaldo Krabbe, PT [facility]   5/24/2021 11:00 AM Uvaldo Krabbe, PT [facility]   5/27/2021  8:45 AM Uvaldo Krabbe, PT [facility]

## 2021-05-14 ENCOUNTER — APPOINTMENT (OUTPATIENT)
Dept: PHYSICAL THERAPY | Age: 76
End: 2021-05-14
Payer: MEDICARE

## 2021-05-17 ENCOUNTER — HOSPITAL ENCOUNTER (OUTPATIENT)
Dept: PHYSICAL THERAPY | Age: 76
Discharge: HOME | End: 2021-05-17
Payer: MEDICARE

## 2021-05-17 PROCEDURE — 97530 THERAPEUTIC ACTIVITIES: CPT

## 2021-05-17 PROCEDURE — 97110 THERAPEUTIC EXERCISES: CPT

## 2021-05-17 PROCEDURE — 97112 NEUROMUSCULAR REEDUCATION: CPT

## 2021-05-17 PROCEDURE — 97016 VASOPNEUMATIC DEVICE THERAPY: CPT

## 2021-05-17 NOTE — PROGRESS NOTES
[facility] at the [address], [place], [address] [facility]  Phone: [phone]      Fax:  [phone]    Discharge Summary    Patient name: Mae Bryan of Care: 2021   Referral Jess Platt MD : [id number]               Medical Diagnosis: Right knee pain [M25.561]    Onset Date: right knee pain DOS: 21              Treatment Diagnosis: right knee pain   Prior Hospitalization: see medical history Provider#: [id number]   Medications: Verified on Patient summary List    Comorbidities: diabetes, HTN, OA.   Prior Level of Function: walking 5 miles 3x/wk, aerobics class    Visits from Start of Care: 30    Missed Visits: 0  Reporting Period : 21 to 21    Goal: To be accomplished in 4 weeks:                   Patient will report compliance with HEP at least 1x/day to aid in rehabilitation program.                   Status at IE: Patient instructed in and provided written copy of initial Home Exercise Program.                   Current 21: Patient reports compliance, reviewed and pt appeared to understand  MET                      Patient will demonstrate right knee AROM of 0 to 120 degrees to aid in completion of ADLs.                  Status at IE: right knee AROM 5 to 102 degrees.                 Current: 21: right knee AROM extension: lacking 2/PROM 0, right knee flexion : 115  AAROM (degrees): flexion: 122 degrees  MET     Long Term Goals: To be accomplished in 8 weeks:                   Patient will increase strength to 5/5 throughout B LEs to aid in completion of ADLs.                    Status at IE: right knee ext 3+, flex 4-                   Current: 21:  right knee flexion: 5/5, extension: 5-/5; left: knee flexion: 5/5, ext: 4+/5, Hip flex: L: 4/5 R: +4/5, Hip abd: L:+04/5 R: 4/5, Hip ext: L: 4/5 R: 4/5  part'l MET                         Patient will report pain less than 1-2/10 average to aid in completion of ADLs.                  Status at IE: 5/10                   Current: 5/17/21: Pt reports that worst pain in the last 48 hrs 3-4/10, least 0/10 -MET                      Patient will perform 5 or more pain free squats with good form/technique to aid in completion of ADLs.                  Status at IE: unable to perform one squat                   Current: 5/17/21 pt able to perform 10 squats without pain. MET                      Patient will demonstrate normal gait pattern on level and stairs without device for 1000 ft.                    Status at IE: antalgic gait with straight cane <200 ft, ambulates stairs one step at a time.                   Current: 5/17/21 Pt ambulated 2x around building which is a little over 500 ft indep without AD. Pt able to ascend and descend stairs reciprocally MET                        Patient will improve FOTO (an established functional score where 100 = no disability) to 71 points overall to demonstrate improvement in functional ability.                  Status at IE:54                   Done on 5/10/21  60- not MET     Updated goals to be met in 2-4 more weeks:     1) Patient will demonstrate sit to stand form a 15 inch mat height with minimal UE support in order to improve ease with toilet transfers. Last PN: Pt can do 10 x 2 sets of sit to stand from 19.5 inches. Current 5/17/21: Pt was able to perform sit to stand from 15\" mat- MET     2) Pt will have at least . 5 cm decrease in swelling in order to improve right knee flexion ROM.   Last PN: Swelling (cm): 2 inches below mid patella: R:43.5   Mid patella: R:49.0    2 inches above mid patella: R:51  Last PN: 3/22/21: Swelling (cm): 2 inches below mid patella: R:44.5   Mid patella: R:49.0    2 inches above mid patella: R:51     Current: 5/17/21 : Swelling (cm): Swelling (cm): 2 inches below mid patella: R:43.5  Mid patella: R:47.8   2 inches above mid patella: R:50.5- part'l MET      3) Pt will demonstrate a right SLR without quad lag in order to safety progress off of SPC. Last PN: right quad lag during SLR.   Current: 4/19/21 pt able to perform SLR with no quad lag MET     4)Pt will be able to perform SLS for 15 seconds to assist with balancing on SLS to bathe, and to ambulate with good quality  Current: 5/17/21:  Balance: SLS: L: 18\" R: 23\"- MET      Assessment/ Summary of Care: Patient is a 70year old female who presented to therapy with s/p right total knee on 1/11/21. Patient started outpatient physical therapy on 2/4/21 and this is patient's 30th visit. Patient has progressed with skilled physical therapy as indicated by inc in MMT, inc in ROM and reports of decreased overall pain. Pt also with improvements in functional mobility. Pt able to perform stairs reciprocally at home now.   Patient continues to complain of stiffness and swelling in the R LE. Pt was given extensive HEP handout last therapy session and reported understanding.  It is recommended that pt transitions to HEP, be d/c'd from skilled PT and remain under care of MD.     RECOMMENDATIONS:  [x]Discontinue therapy: [x]Patient has reached or is progressing toward set goals      []Patient is non-compliant or has abdicated      []Due to lack of appreciable progress towards set goals    Marisabel Bueno, PT, DPT, [facility] 5/17/2021 1:30 PM

## 2021-05-17 NOTE — PROGRESS NOTES
PT DAILY TREATMENT NOTE    Patient Name: Maximo Subramanian  Date:2021  : 1945  [x]  Patient  Verified  Payor: Gabe Benites / Plan: [facility] [place] A & B / Product Type: Medicare /    In time: 11:46  Out time: 12:51  Total Treatment Time (min): 65  Total Timed Codes (min): 55  1:1 Treatment Time ( [address] only): 48   Visit #: 30 of 30    Treatment Area: Right knee pain [M25.561]    SUBJECTIVE  Pain Level (0-10 scale): 0/10  Any medication changes, allergies to medications, adverse drug reactions, diagnosis change, or new procedure performed?: [x] No    [] Yes (see summary sheet for update)  Subjective functional status/changes:   [] No changes reported  Pt reports that worst pain in the last 48 hrs 3-4/10, least 0/10. Reports that she is compliant with HEP, at least once a day, tries to do it twice. Reports that [place], about 1 mile. Reports that she is able to walk around grocery store. Reports that she is working on the steps and that is getting better. Reports that she is performing stairs reciprocally, hurts, but she is able to do it. Reports that she is 70% better since starting therapy. Reports that she feels that she is ready to transition to HEP.      OBJECTIVE:  Pt enters gym in no apparent distress    Modalities Rationale:     decrease edema, decrease inflammation and decrease pain to improve patient's ability to improve patient's ability to perform pain free ADL's    min [] Estim, type/location:                                      []  att     []  unatt     []  w/US     []  w/ice    []  w/heat    min []  Mechanical Traction: type/lbs                   []  pro   []  sup   []  int   []  cont    []  before manual    []  after manual    min []  Ultrasound, settings/location:      min []  Iontophoresis w/ dexamethasone, location:                                               []  take home patch       []  in clinic    min []  Ice     []  Heat    location/position:    10 min [x] Vasopneumatic Device, press/temp: Pt long sitting with bolster under knee, 34 degrees, low pressure    min []  Other:    [] Skin assessment post-treatment (if applicable):    []  intact    []  redness- no adverse reaction     []redness  adverse reaction:            27 min Therapeutic Exercise:  [x] See flow sheet :   Rationale: increase ROM and increase strength to improve the patients ability to perform daily activities with decreased pain and symptom levels      15 min Therapeutic Activity:  [x]  See flow sheet :   Rationale: increase ROM, increase strength  to improve the patients ability to perform daily activities with decreased pain and symptom levels     10 min Neuromuscular Re-education:  [x]  See flow sheet :   Rationale: improve coordination, improve balance, and increase proprioception  to improve the patients ability to perform daily activities with decreased pain and symptom levels. 3  NC min Manual Therapy:   Pt supine: tibial ER grade III mobs, PROM knee extension   Rationale: increase tissue extensibility, decrease trigger points, and increase postural awareness to improve the patients ability to perform daily activities with decreased pain and symptom levels. The manual therapy interventions were performed at a separate and distinct time from the therapeutic activities interventions.           With   [x] TE   [] TA   [] neuro   [] other: Patient Education: [x] Review HEP    [] Progressed/Changed HEP based on:   [] positioning   [] body mechanics   [] transfers   [] heat/ice application    [] other:      Other Objective/Functional Measures:   right knee AROM extension: lacking 2/PROM 0, right knee flexion : 115  AAROM (degrees): flexion: 122 degrees      right knee flexion: 5/5, extension: 5-/5; left: knee flexion: 5/5, ext: 4+/5  Hip flex: L: 4/5 R: +4/5  Hip abd: L:+04/5 R: 4/5  Hip ext: L: 4/5 R: 4/5      Balance: SLS: L: 18\" R: 23\"      Swelling (cm): 2 inches below mid patella: R:43.5  Mid patella: R:47.8   2 inches above mid patella: R:50.5     Squat: slight shifting to the left, narrow base of support, anterior tibial translation, better with demonstration, verbal and tactile cuing     Ambulation: Pt ambulates without AD independently around the gym, with Left>right toe out,      Stairs: Pt requires UE supports to perform stairs reciprocally    Sit to stand: pt able to stand from 15\" table without UE support    Gait: Pt ambulated     Pain Level (0-10 scale) post treatment: 0/10    ASSESSMENT/Changes in Function:  Patient is a 70year old female who presented to therapy with s/p right total knee on 1/11/21. Patient started outpatient physical therapy on 2/4/21 and this is patient's 30th visit. Patient has progressed with skilled physical therapy as indicated by inc in MMT, inc in ROM and reports of decreased overall pain. Pt also with improvements in functional mobility. Pt able to perform stairs reciprocally at home now.   Patient continues to complain of stiffness and swelling in the R LE. Pt was given extensive HEP handout last therapy session and reported understanding. It is recommended that pt transitions to HEP, be d/c'd from skilled PT and remain under care of MD.     Patient tolerated therapy session well as there were no adverse reactions today.         []  See Plan of Care  []  See progress note/recertification  [x]  See Discharge Summary         Progress towards goals / Updated goals:  Short Term Goals: To be accomplished in 4 weeks:                   Patient will report compliance with HEP at least 1x/day to aid in rehabilitation program.                   Status at IE: Patient instructed in and provided written copy of initial Home Exercise Program.                   Current 5/17/21: Patient reports compliance, reviewed and pt appeared to understand  MET                      Patient will demonstrate right knee AROM of 0 to 120 degrees to aid in completion of ADLs.                   Status at IE: right knee AROM 5 to 102 degrees.                 Current: 5/17/21: right knee AROM extension: lacking 2/PROM 0, right knee flexion : 115  AAROM (degrees): flexion: 122 degrees  MET     Long Term Goals: To be accomplished in 8 weeks:                   Patient will increase strength to 5/5 throughout B LEs to aid in completion of ADLs.                  Status at IE: right knee ext 3+, flex 4-                   Current: 5/17/21:  right knee flexion: 5/5, extension: 5-/5; left: knee flexion: 5/5, ext: 4+/5, Hip flex: L: 4/5 R: +4/5, Hip abd: L:+04/5 R: 4/5, Hip ext: L: 4/5 R: 4/5  part'l MET                         Patient will report pain less than 1-2/10 average to aid in completion of ADLs.                  Status at IE: 5/10                   Current: 5/17/21: Pt reports that worst pain in the last 48 hrs 3-4/10, least 0/10 -MET                      Patient will perform 5 or more pain free squats with good form/technique to aid in completion of ADLs.                  Status at IE: unable to perform one squat                   Current: 5/17/21 pt able to perform 10 squats without pain. MET                      Patient will demonstrate normal gait pattern on level and stairs without device for 1000 ft.                    Status at IE: antalgic gait with straight cane <200 ft, ambulates stairs one step at a time.                   Current: 5/17/21 Pt ambulated 2x around building which is a little over 500 ft indep without AD. Pt able to ascend and descend stairs reciprocally MET                        Patient will improve FOTO (an established functional score where 100 = no disability) to 71 points overall to demonstrate improvement in functional ability.                    Status at IE:54                   Done on 5/10/21  60- not MET     Updated goals to be met in 2-4 more weeks:     1) Patient will demonstrate sit to stand form a 15 inch mat height with minimal UE support in order to improve ease with toilet transfers. Last PN: Pt can do 10 x 2 sets of sit to stand from 19.5 inches. Current 5/17/21: Pt was able to perform sit to stand from 15\" mat- MET     2) Pt will have at least . 5 cm decrease in swelling in order to improve right knee flexion ROM. Last PN: Swelling (cm): 2 inches below mid patella: R:43.5   Mid patella: R:49.0    2 inches above mid patella: R:51  Last PN: 3/22/21: Swelling (cm): 2 inches below mid patella: R:44.5   Mid patella: R:49.0    2 inches above mid patella: R:51     Current: 5/17/21 : Swelling (cm): Swelling (cm): 2 inches below mid patella: R:43.5  Mid patella: R:47.8   2 inches above mid patella: R:50.5- part'l MET      3) Pt will demonstrate a right SLR without quad lag in order to safety progress off of SPC.   Last PN: right quad lag during SLR.   Current: 4/19/21 pt able to perform SLR with no quad lag MET     4)Pt will be able to perform SLS for 15 seconds to assist with balancing on SLS to bathe, and to ambulate with good quality  Current: 5/17/21:  Balance: SLS: L: 18\" R: 23\"    PLAN  [x]  Upgrade activities as tolerated     [x]  Continue plan of care  [x]  Update interventions per flow sheet       []  Discharge due to:_  []  Other:_      Robyn Pimentel, PT, DPT, CIMT 5/17/2021  11:44 AM    Future Appointments   Date Time Provider Leo Garcia   5/17/2021 11:45 AM Dajuan Ceja PT JAIR [facility]   5/20/2021  2:30 PM Dajuan Ceja PT MIHPSURY [facility]   5/24/2021 11:00 AM Dajuan Ceja PT [facility]PTB[facility]ERAN [facility]   5/27/2021  8:45 AM Dajuan Ceja PT [facility]

## 2021-05-19 ENCOUNTER — APPOINTMENT (OUTPATIENT)
Dept: PHYSICAL THERAPY | Age: 76
End: 2021-05-19
Payer: MEDICARE

## 2021-05-20 ENCOUNTER — APPOINTMENT (OUTPATIENT)
Dept: PHYSICAL THERAPY | Age: 76
End: 2021-05-20
Payer: MEDICARE

## 2021-05-24 ENCOUNTER — APPOINTMENT (OUTPATIENT)
Dept: PHYSICAL THERAPY | Age: 76
End: 2021-05-24
Payer: MEDICARE

## 2021-05-26 ENCOUNTER — APPOINTMENT (OUTPATIENT)
Dept: PHYSICAL THERAPY | Age: 76
End: 2021-05-26
Payer: MEDICARE

## 2021-05-27 ENCOUNTER — APPOINTMENT (OUTPATIENT)
Dept: PHYSICAL THERAPY | Age: 76
End: 2021-05-27
Payer: MEDICARE

## 2022-03-19 PROBLEM — T84.018A FAILED TOTAL KNEE ARTHROPLASTY (HCC): Status: ACTIVE | Noted: 2021-01-01

## 2022-03-19 PROBLEM — M17.11 PRIMARY LOCALIZED OSTEOARTHRITIS OF RIGHT KNEE: Status: ACTIVE | Noted: 2021-01-01

## 2022-03-19 PROBLEM — Z96.659 FAILED TOTAL KNEE ARTHROPLASTY (HCC): Status: ACTIVE | Noted: 2021-01-01

## 2023-08-09 ENCOUNTER — HOSPITAL ENCOUNTER (OUTPATIENT)
Facility: HOSPITAL | Age: 78
Setting detail: RECURRING SERIES
Discharge: HOME | End: 2023-08-12
Payer: MEDICARE

## 2023-08-09 PROCEDURE — 97162 PT EVAL MOD COMPLEX 30 MIN: CPT

## 2023-08-09 PROCEDURE — 97112 NEUROMUSCULAR REEDUCATION: CPT

## 2023-08-09 PROCEDURE — 97530 THERAPEUTIC ACTIVITIES: CPT

## 2023-08-15 ENCOUNTER — HOSPITAL ENCOUNTER (OUTPATIENT)
Facility: HOSPITAL | Age: 78
Setting detail: RECURRING SERIES
Discharge: HOME | End: 2023-08-18
Payer: MEDICARE

## 2023-08-15 PROCEDURE — 97530 THERAPEUTIC ACTIVITIES: CPT

## 2023-08-15 PROCEDURE — 97112 NEUROMUSCULAR REEDUCATION: CPT

## 2023-08-15 PROCEDURE — 97110 THERAPEUTIC EXERCISES: CPT

## 2023-08-15 NOTE — PROGRESS NOTES
PHYSICAL / OCCUPATIONAL THERAPY - DAILY TREATMENT NOTE (updated )    Patient Name: Aramis Srivastava    Date: 8/15/2023    : 1945  Insurance: Payor: MEDICARE / Plan: MEDICARE PART A AND B / Product Type: *No Product type* /      Patient  verified Yes     Visit #   Current / Total 2 24   Time   In / Out 9:25 10:13   Pain   In / Out 0 0   Subjective Functional Status/Changes: Reports that she wants more age appropriate HEP. Reports she is doing them on her bed and afraid of falling off the bed. Reports that the brace they gave her keeps slipping. Changes to: Allergies, Med Hx, Sx Hx?   no       TREATMENT AREA =  Pain in left knee [M25.562]    OBJECTIVE    Therapeutic Procedures: Tx Min Billable or 1:1 Min (if diff from Tx Min) Procedure, Rationale, Specifics   8  26020 Therapeutic Exercise (timed):  increase ROM, strength, coordination, balance, and proprioception to improve patient's ability to progress to PLOF and address remaining functional goals. (see flow sheet as applicable)     Details if applicable:       10  70140 Therapeutic Activity (timed):  use of dynamic activities replicating functional movements to increase ROM, strength, coordination, balance, and proprioception in order to improve patient's ability to progress to PLOF and address remaining functional goals. (see flow sheet as applicable)     Details if applicable:  edcuated on donning and fitting brace, educated pt on pelvic positioning with sitting, standing, sleeping   NC  91422 Manual Therapy (timed):  decrease pain, increase ROM, increase tissue extensibility, decrease trigger points, and increase postural awareness to improve patient's ability to progress to PLOF and address remaining functional goals. The manual therapy interventions were performed at a separate and distinct time from the therapeutic activities interventions .  (see flow sheet as applicable)     Details if applicable:  assisted with rib mobility during treatment

## 2023-08-18 ENCOUNTER — HOSPITAL ENCOUNTER (OUTPATIENT)
Facility: HOSPITAL | Age: 78
Setting detail: RECURRING SERIES
Discharge: HOME | End: 2023-08-21
Payer: MEDICARE

## 2023-08-18 PROCEDURE — 97530 THERAPEUTIC ACTIVITIES: CPT

## 2023-08-18 PROCEDURE — 97112 NEUROMUSCULAR REEDUCATION: CPT

## 2023-08-18 PROCEDURE — 97110 THERAPEUTIC EXERCISES: CPT

## 2023-08-18 PROCEDURE — 97016 VASOPNEUMATIC DEVICE THERAPY: CPT

## 2023-08-18 NOTE — PROGRESS NOTES
PHYSICAL / OCCUPATIONAL THERAPY - DAILY TREATMENT NOTE (updated )    Patient Name: Stacy Gil    Date: 2023    : 1945  Insurance: Payor: MEDICARE / Plan: MEDICARE PART A AND B / Product Type: *No Product type* /      Patient  verified Yes     Visit #   Current / Total 3 24   Time   In / Out 920 1013   Pain   In / Out 4 0   Subjective Functional Status/Changes: Reports increased pain for reasons unknown. Wearing brace today    Changes to: Allergies, Med Hx, Sx Hx?   no       TREATMENT AREA =  Pain in left knee [M25.562]    OBJECTIVE    Modalities Rationale:     decrease edema, decrease inflammation, and decrease pain to improve patient's ability to progress to PLOF and address remaining functional goals. min [] Estim Unattended, type/location:                                      []  w/ice    []  w/heat    min [] Estim Attended, type/location:                                     []  w/US     []  w/ice    []  w/heat    []  TENS insruct      min []  Mechanical Traction: type/lbs                   []  pro   []  sup   []  int   []  cont    []  before manual    []  after manual    min []  Ultrasound, settings/location:      min []  Iontophoresis w/ dexamethasone, location:                                               []  take home patch       []  in clinic        min  unbilled []  Ice     []  Heat    location/position:     min []  Paraffin,  details:    10 min [x]  Vasopneumatic Device, press/temp:  Medium pressure/34 deg     min []  Radha Boswell / Salvador Sea: If using vaso (only need to measure limb vaso being performed on)      pre-treatment girth : 45 cm       post-treatment girth : 44.5 cm      measured at (landmark location) :  Mid patella     min []  Other:    Skin assessment post-treatment (if applicable):    [x]  intact    []  redness- no adverse reaction                 []redness - adverse reaction:           Therapeutic Procedures:   Tx Min Billable or 1:1 Min (if diff from Caitlin)

## 2023-08-23 ENCOUNTER — HOSPITAL ENCOUNTER (OUTPATIENT)
Facility: HOSPITAL | Age: 78
Setting detail: RECURRING SERIES
Discharge: HOME | End: 2023-08-26
Payer: MEDICARE

## 2023-08-23 PROCEDURE — 97112 NEUROMUSCULAR REEDUCATION: CPT

## 2023-08-23 PROCEDURE — 97110 THERAPEUTIC EXERCISES: CPT

## 2023-08-23 PROCEDURE — 97530 THERAPEUTIC ACTIVITIES: CPT

## 2023-08-23 NOTE — PROGRESS NOTES
PHYSICAL / OCCUPATIONAL THERAPY - DAILY TREATMENT NOTE (updated )    Patient Name: Kimi Shown    Date: 2023    : 1945  Insurance: Payor: MEDICARE / Plan: MEDICARE PART A AND B / Product Type: *No Product type* /      Patient  verified Yes     Visit #   Current / Total 4 24   Time   In / Out 1:21 2:10   Pain   In / Out 0 0   Subjective Functional Status/Changes: Reports that she got her new shoes on Saturday. Reports that they are really comfortable. Reports that she feels herself doing the heel/toe. Pt questions if she should do the bicycle at the gym. Reports she does Silver Sneakers there. Changes to: Allergies, Med Hx, Sx Hx?   no       TREATMENT AREA =  Pain in left knee [M25.562]    OBJECTIVE        Therapeutic Procedures: Tx Min Billable or 1:1 Min (if diff from Tx Min) Procedure, Rationale, Specifics   8  90165 Therapeutic Exercise (timed):  increase ROM, strength, coordination, balance, and proprioception to improve patient's ability to progress to PLOF and address remaining functional goals. (see flow sheet as applicable)     Details if applicable:       10  79532 Therapeutic Activity (timed):  use of dynamic activities replicating functional movements to increase ROM, strength, coordination, balance, and proprioception in order to improve patient's ability to progress to PLOF and address remaining functional goals. (see flow sheet as applicable)     Details if applicable:  educated pt on performing warm up on the bike and some pelvic repositioning techniques prior to working out   312 [id number] Neuromuscular Re-Education (timed):  improve balance, coordination, kinesthetic sense, posture, core stability and proprioception to improve patient's ability to develop conscious control of individual muscles and awareness of position of extremities in order to progress to PLOF and address remaining functional goals.  (see flow sheet as applicable)     Details if applicable:               49

## 2023-08-25 ENCOUNTER — HOSPITAL ENCOUNTER (OUTPATIENT)
Facility: HOSPITAL | Age: 78
Setting detail: RECURRING SERIES
Discharge: HOME | End: 2023-08-28
Payer: MEDICARE

## 2023-08-25 PROCEDURE — 97140 MANUAL THERAPY 1/> REGIONS: CPT

## 2023-08-25 PROCEDURE — 97112 NEUROMUSCULAR REEDUCATION: CPT

## 2023-08-25 PROCEDURE — 97530 THERAPEUTIC ACTIVITIES: CPT

## 2023-08-25 NOTE — PROGRESS NOTES
by at least . 5 grade MMT  and 5-10# inc in knee ext/flexion so pt can perform stairs. Status at IE:    Left (0-5) Right (0-5)   Knee extension 30# 30#   Knee flexion 10# 20#   Hip ER 4- 4   Hip IR 4- 4-      Current: Same as IE     2. Patient will report pain no greater than 1-2/10 throughout entire day to aid in completion of ADLs. Status at IE:_7__/10 max (in the last 48 hrs) _0__/10 min (in the last 48 hrs) __0_/10 currently at rest;      Current:  8/23/23 0/10      3. Patient will improve FOTO score to 69 points to demonstrate improvement in functional status. Status at IE:56  Current: 8/25/23 57     *FOTO score is an established functional score where 100 = no disability*     5. Pt will have 2 inch improvement in Lower trunk rotation and negative adductor drop test so that pt can have proper pelvic mechanics to perform ADLs. Status at IE: Adductor Drop Test: right:  positive   left: positive  Lower trunk rotation (inches): Right: 16 left: 18  Current: 8/23/23 Adductor Drop Test: right: positive/neg    left: positive/neg;  Lower trunk rotation (inches): Right: 18/16 left: 17/16    PLAN  Yes  Continue plan of care  []  Upgrade activities as tolerated  []  Discharge due to :  []  Other:    Kiara Langston PTA    8/25/2023    7:55 AM    Future Appointments   Date Time Provider [address]   8/25/2023  9:20 AM PAMELA GillespieHPMARILEE [facility]   8/29/2023  2:00 PM PAMELA GillespieHPMARILEE [facility]   8/31/2023 10:40 AM PAMELA GillespieHPMARILEE [facility]   9/5/2023 11:20 AM NEFTALI Benjamin [facility]   9/7/2023 10:40 AM NEFTALI Benjamin [facility]   9/12/2023 10:40 AM NEFTALI Benjamin [facility]   9/14/2023 10:40 AM Lacey Saunders PT [facility]   9/19/2023 10:40 AM Lacey Saunders, PT [facility]   9/21/2023 10:40 AM Kiara Langston, PTA [facility]   9/26/2023 10:40 AM Verba Blazer, PTA [facility]   9/28/2023 10:40 AM Verba Blapage, PTA [facility]

## 2023-08-29 ENCOUNTER — APPOINTMENT (OUTPATIENT)
Facility: HOSPITAL | Age: 78
End: 2023-08-29
Payer: MEDICARE

## 2023-08-31 ENCOUNTER — HOSPITAL ENCOUNTER (OUTPATIENT)
Facility: HOSPITAL | Age: 78
Setting detail: RECURRING SERIES
End: 2023-08-31
Payer: MEDICARE

## 2023-08-31 PROCEDURE — 97112 NEUROMUSCULAR REEDUCATION: CPT

## 2023-08-31 PROCEDURE — 97530 THERAPEUTIC ACTIVITIES: CPT

## 2023-08-31 PROCEDURE — 97110 THERAPEUTIC EXERCISES: CPT

## 2023-08-31 NOTE — PROGRESS NOTES
PHYSICAL / OCCUPATIONAL THERAPY - DAILY TREATMENT NOTE (updated )    Patient Name: Farhat Mcrae    Date: 2023    : 1945  Insurance: Payor: MEDICARE / Plan: MEDICARE PART A AND B / Product Type: *No Product type* /      Patient  verified Yes     Visit #   Current / Total 6 24   Time   In / Out 1038 1120   Pain   In / Out 3 0   Subjective Functional Status/Changes: Reports pain in the inner left knee. Reports walking a mile with no difficulty this morning at the track   Changes to: Allergies, Med Hx, Sx Hx?   no       TREATMENT AREA =  Pain in left knee [M25.562]    OBJECTIVE      Therapeutic Procedures: Tx Min Billable or 1:1 Min (if diff from Tx Min) Procedure, Rationale, Specifics   10  60474 Therapeutic Exercise (timed):  increase ROM, strength, coordination, balance, and proprioception to improve patient's ability to progress to PLOF and address remaining functional goals. (see flow sheet as applicable)     Details if applicable:       12  35330 Neuromuscular Re-Education (timed):  improve balance, coordination, kinesthetic sense, posture, core stability and proprioception to improve patient's ability to develop conscious control of individual muscles and awareness of position of extremities in order to progress to PLOF and address remaining functional goals. (see flow sheet as applicable)     Details if applicable:     20  16223 Therapeutic Activity (timed):  use of dynamic activities replicating functional movements to increase ROM, strength, coordination, balance, and proprioception in order to improve patient's ability to progress to PLOF and address remaining functional goals.   (see flow sheet as applicable)     Details if applicable:            Details if applicable:            Details if applicable:     43  [facility] Totals Reminder: bill using total billable min of TIMED therapeutic procedures (example: do not include dry needle or estim unattended, both untimed codes, in totals to 10:40 AM Moo Epps PTA [facility]

## 2023-09-05 ENCOUNTER — HOSPITAL ENCOUNTER (OUTPATIENT)
Facility: HOSPITAL | Age: 78
Setting detail: RECURRING SERIES
Discharge: HOME | End: 2023-09-08
Payer: MEDICARE

## 2023-09-05 PROCEDURE — 97112 NEUROMUSCULAR REEDUCATION: CPT

## 2023-09-05 PROCEDURE — 97530 THERAPEUTIC ACTIVITIES: CPT

## 2023-09-05 NOTE — PROGRESS NOTES
PHYSICAL / OCCUPATIONAL THERAPY - DAILY TREATMENT NOTE (updated )    Patient Name: Cal Porter    Date: 2023    : 1945  Insurance: Payor: MEDICARE / Plan: MEDICARE PART A AND B / Product Type: *No Product type* /      Patient  verified Yes     Visit #   Current / Total 7 24   Time   In / Out 11:24  12:06    Pain   In / Out 3 0   Subjective Functional Status/Changes: Reports that she just walked around the track. Reports that she is feeling some pain in the medial tiba region, (pointing to pes anserine region)   Changes to: Allergies, Med Hx, Sx Hx?   no       TREATMENT AREA =  Pain in left knee [M25.562]    OBJECTIVE  Therapeutic Procedures: Tx Min Billable or 1:1 Min (if diff from Tx Min) Procedure, Rationale, Specifics   11 0 55851 Therapeutic Exercise (timed):  increase ROM, strength, coordination, balance, and proprioception to improve patient's ability to progress to PLOF and address remaining functional goals. (see flow sheet as applicable)     Details if applicable:       10 10 33971 Therapeutic Activity (timed):  use of dynamic activities replicating functional movements to increase ROM, strength, coordination, balance, and proprioception in order to improve patient's ability to progress to PLOF and address remaining functional goals. (see flow sheet as applicable)     Details if applicable:      64560 Neuromuscular Re-Education (timed):  improve balance, coordination, kinesthetic sense, posture, core stability and proprioception to improve patient's ability to develop conscious control of individual muscles and awareness of position of extremities in order to progress to PLOF and address remaining functional goals.  (see flow sheet as applicable)     Details if applicable:               43 31 [facility] Totals Reminder: bill using total billable min of TIMED therapeutic procedures (example: do not include dry needle or estim unattended, both untimed codes, in totals to left)  8-22 min =

## 2023-09-07 ENCOUNTER — HOSPITAL ENCOUNTER (OUTPATIENT)
Facility: HOSPITAL | Age: 78
Setting detail: RECURRING SERIES
Discharge: HOME | End: 2023-09-10
Payer: MEDICARE

## 2023-09-07 PROCEDURE — 97112 NEUROMUSCULAR REEDUCATION: CPT

## 2023-09-07 PROCEDURE — 97110 THERAPEUTIC EXERCISES: CPT

## 2023-09-07 PROCEDURE — 97530 THERAPEUTIC ACTIVITIES: CPT

## 2023-09-07 NOTE — PROGRESS NOTES
[facility] at the [place] [address] [address]  Phone: [phone]      Fax:  [phone]    Progress Note  Patient name: Georgina Valero Start of Care: 2023   Referral source: Isabela Marcano MD : 1945               Medical Diagnosis: Pain in left knee [M25.562]        Onset Date:23               Treatment Diagnosis: M25.562  LEFT KNEE PAIN                             Prior Hospitalization: see medical history Provider#: [id number]   Medications: Verified on Patient Summary List      Co-morbidities: PMHx/Surgical Hx:  [x]DM [x] HTN (controlled) [] High cholesterol (controlled) [] Cancer [x] Arthritis   [x]Other right TKR, back and hip pain  Prior Level of Function:  functionally independent, with AD SPC,   Social/Recreation/Work: Work Hx: retired  Living Situation: n/a  Recreational Activities: [employer], silver sneakers          Visits from [place] of Care: ***    Missed Visits: ***    Updated Goals/Measure of Progress: To be achieved in *** weeks:    ***    Summary of Care/ Key Functional Changes: ***      ASSESSMENT/RECOMMENDATIONS:  Continue per plan of care.      Thank you for this referral.   Paty Mcallister, PT 2023 7:28 AM

## 2023-09-07 NOTE — PROGRESS NOTES
PHYSICAL / OCCUPATIONAL THERAPY - DAILY TREATMENT NOTE (updated )    Patient Name: Cal Porter    Date: 2023    : 1945  Insurance: Payor: MEDICARE / Plan: MEDICARE PART A AND B / Product Type: *No Product type* /      Patient  verified Yes     Visit #   Current / Total 8 24   Time   In / Out 10:35am 11:30am   Pain   In / Out 0 0   Subjective Functional Status/Changes: Pt got new shoes recommended by PT and states that they are helping her a lot. She can feel \"heel toe\" during gait cycle     Changes to: Allergies, Med Hx, Sx Hx?   no       TREATMENT AREA =  Pain in left knee [M25.562]    OBJECTIVE  Therapeutic Procedures: Tx Min Billable or 1:1 Min (if diff from Tx Min) Procedure, Rationale, Specifics   15  59538 Therapeutic Exercise (timed):  increase ROM, strength, coordination, balance, and proprioception to improve patient's ability to progress to PLOF and address remaining functional goals. (see flow sheet as applicable)     Details if applicable:       15  86488 Therapeutic Activity (timed):  use of dynamic activities replicating functional movements to increase ROM, strength, coordination, balance, and proprioception in order to improve patient's ability to progress to PLOF and address remaining functional goals. (see flow sheet as applicable)     Details if applicable:      25  27095 Neuromuscular Re-Education (timed):  improve balance, coordination, kinesthetic sense, posture, core stability and proprioception to improve patient's ability to develop conscious control of individual muscles and awareness of position of extremities in order to progress to PLOF and address remaining functional goals.  (see flow sheet as applicable)     Details if applicable:               54  [address] [place] Reminder: bill using total billable min of TIMED therapeutic procedures (example: do not include dry needle or estim unattended, both untimed codes, in totals to left)  8-22 min = 1 unit; 23-37 min = 2

## 2023-09-07 NOTE — PROGRESS NOTES
PHYSICAL / OCCUPATIONAL THERAPY - DAILY TREATMENT NOTE (updated )    Patient Name: Conchis Smith    Date: 2023    : 1945  Insurance: Payor: MEDICARE / Plan: MEDICARE PART A AND B / Product Type: *No Product type* /      Patient  verified {YES/NO:62652}     Visit #   Current / Total *** ***   Time   In / Out *** ***   Pain   In / Out *** ***   Subjective Functional Status/Changes: ***   Changes to: Allergies, Med Hx, Sx Hx?   {YES/NO DIET:922506381}       TREATMENT AREA =  Pain in left knee [M25.562]    OBJECTIVE    Modalities Rationale:     {[employer] Modality Rationale:98227} to improve patient's ability to progress to PLOF and address remaining functional goals. min [] Estim Unattended, type/location:                                      []  w/ice    []  w/heat    min [] Estim Attended, type/location:                                     []  w/US     []  w/ice    []  w/heat    []  TENS insruct      min []  Mechanical Traction: type/lbs                   []  pro   []  sup   []  int   []  cont    []  before manual    []  after manual    min []  Ultrasound, settings/location:      min []  Iontophoresis w/ dexamethasone, location:                                               []  take home patch       []  in clinic        min  unbilled []  Ice     []  Heat    location/position:     min []  Paraffin,  details:     min []  Vasopneumatic Device, press/temp:     min []  [place]  / [place]: If using vaso (only need to measure limb vaso being performed on)      pre-treatment girth :       post-treatment girth :       measured at (landmark location) :      min []  Other:    Skin assessment post-treatment (if applicable):    []  intact    []  redness- no adverse reaction                 []redness - adverse reaction:         Therapeutic Procedures:   Tx Min Billable or 1:1 Min (if diff from Tx Min) Procedure, Rationale, Specifics   ***  {InMotion Ther Procedures (Optional):75178}    Details if applicable:

## 2023-09-12 ENCOUNTER — APPOINTMENT (OUTPATIENT)
Facility: HOSPITAL | Age: 78
End: 2023-09-12
Payer: MEDICARE

## 2023-09-14 ENCOUNTER — HOSPITAL ENCOUNTER (OUTPATIENT)
Facility: HOSPITAL | Age: 78
Setting detail: RECURRING SERIES
End: 2023-09-14
Payer: MEDICARE

## 2023-09-14 NOTE — PROGRESS NOTES
[facility] at the [place] [address] [address]  Phone: [phone]      Fax:  [phone]    Progress Note  Patient name: Jadiel Mcdonald Start of Care: 2023   Referral source: Aaron Gunderson MD : 1945               Medical Diagnosis: Pain in left knee [M25.562]        Onset Date:23               Treatment Diagnosis: M25.562  LEFT KNEE PAIN                             Prior Hospitalization: see medical history Provider#: [id number]   Medications: Verified on Patient Summary List      Co-morbidities: PMHx/Surgical Hx:  [x]DM [x] HTN (controlled) [] High cholesterol (controlled) [] Cancer [x] Arthritis   [x]Other right TKR, back and hip pain  Prior Level of Function:  functionally independent, with AD SPC,   Social/Recreation/Work: Work Hx: retired  Living Situation: n/a  Recreational Activities: [employer]                                 Visits from [place] of Care: 9    Missed Visits: 1    Updated Goals/Measure of Progress: Short Term Goals: To be accomplished in 4 weeks: 1. Patient will report compliance with HEP 1x/day to aid in rehabilitation program.  Status at IE: Provided pt with HEP and pt appeared to understand. Current: 23 reports compliance, needs to be updated  PN 9/15/23: compliant at least 1 times, sometimes 2x/day, progressing      2. Patient will rate pain on greater than 4/10 so pt can perform ADL's. Status at IE:_7__/10 max (in the last 48 hrs) _0__/10 min (in the last 48 hrs) __0_/10 currently at rest;      Current: 23: 3/10   PN 9/15/23: last 48 hours: 0/10 at worst; last occurrence of pain was last week, MET     Long Term Goals: To be accomplished in 12 weeks: 1. Patient will increase limited Bilateral hip ER/IR strength by at least . 5 grade MMT  and 5-10# inc in knee ext/flexion so pt can perform stairs.   Status at IE:    Left (0-5) Right (0-5)   Knee extension 30# 30#   Knee flexion 10# 20#   Hip
activities as tolerated  []  Discharge due to :  []  Other:    Dorinda Dent, PT    9/14/2023    3:13 PM    Future Appointments   Date Time Provider [address]   9/15/2023  9:20 AM Dorinda Dent, PT [facility]   9/19/2023 10:40 AM Cristian Brown PT [facility]   9/21/2023 10:40 AM Britt Floras, PTA [facility]   9/26/2023 10:40 AM Britt Floras, PTA [facility]   9/28/2023 10:40 AM Britt Floras, PTA [facility]

## 2023-09-15 ENCOUNTER — HOSPITAL ENCOUNTER (OUTPATIENT)
Facility: HOSPITAL | Age: 78
Setting detail: RECURRING SERIES
Discharge: HOME | End: 2023-09-18
Payer: MEDICARE

## 2023-09-15 PROCEDURE — 97530 THERAPEUTIC ACTIVITIES: CPT

## 2023-09-15 PROCEDURE — 97112 NEUROMUSCULAR REEDUCATION: CPT

## 2023-09-15 PROCEDURE — 97110 THERAPEUTIC EXERCISES: CPT

## 2023-09-19 ENCOUNTER — HOSPITAL ENCOUNTER (OUTPATIENT)
Facility: HOSPITAL | Age: 78
Setting detail: RECURRING SERIES
Discharge: HOME | End: 2023-09-22
Payer: MEDICARE

## 2023-09-19 PROCEDURE — 97110 THERAPEUTIC EXERCISES: CPT

## 2023-09-19 PROCEDURE — 97112 NEUROMUSCULAR REEDUCATION: CPT

## 2023-09-19 PROCEDURE — 97530 THERAPEUTIC ACTIVITIES: CPT

## 2023-09-19 NOTE — PROGRESS NOTES
PHYSICAL / OCCUPATIONAL THERAPY - DAILY TREATMENT NOTE (updated )    Patient Name: Jose Luis Downing    Date: 2023    : 1945  Insurance: Payor: MEDICARE / Plan: MEDICARE PART A AND B / Product Type: *No Product type* /      Patient  verified Yes     Visit #   Current / Total 10 24   Time   In / Out 10:41 11:25   Pain   In / Out 0 0   Subjective Functional Status/Changes: Reports that she felt fine after last therapy session. Reports that    Changes to: Allergies, Med Hx, Sx Hx?   no       TREATMENT AREA =  Pain in left knee [M25.562]    OBJECTIVE    Therapeutic Procedures: Tx Min Billable or 1:1 Min (if diff from Tx Min) Procedure, Rationale, Specifics   15  23025 Therapeutic Exercise (timed):  increase ROM, strength, coordination, balance, and proprioception to improve patient's ability to progress to PLOF and address remaining functional goals. (see flow sheet as applicable)    Details if applicable:       10  39688 Therapeutic Activity (timed):  use of dynamic activities replicating functional movements to increase ROM, strength, coordination, balance, and proprioception in order to improve patient's ability to progress to PLOF and address remaining functional goals. (see flow sheet as applicable)    Details if applicable:          19  07656 Neuromuscular Re-Education (timed):  improve balance, coordination, kinesthetic sense, posture, core stability and proprioception to improve patient's ability to develop conscious control of individual muscles and awareness of position of extremities in order to progress to PLOF and address remaining functional goals.  (see flow sheet as applicable)    Details if applicable:            Details if applicable:     40  [facility] Totals Reminder: bill using total billable min of TIMED therapeutic procedures (example: do not include dry needle or estim unattended, both untimed codes, in totals to left)  8-22 min = 1 unit; 23-37 min = 2 units; 38-52 min = 3 units; 53-67 DISCHARGE

## 2023-09-21 ENCOUNTER — HOSPITAL ENCOUNTER (OUTPATIENT)
Facility: HOSPITAL | Age: 78
Setting detail: RECURRING SERIES
Discharge: HOME | End: 2023-09-24
Payer: MEDICARE

## 2023-09-21 PROCEDURE — 97112 NEUROMUSCULAR REEDUCATION: CPT

## 2023-09-21 PROCEDURE — 97530 THERAPEUTIC ACTIVITIES: CPT

## 2023-09-21 PROCEDURE — 97110 THERAPEUTIC EXERCISES: CPT

## 2023-09-21 NOTE — PROGRESS NOTES
left)  8-22 min = 1 unit; 23-37 min = 2 units; 38-52 min = 3 units; 53-67 min = 4 units; 68-82 min = 5 units   Total Total     TOTAL TREATMENT TIME:        54     [x]  Patient Education billed concurrently with other procedures   [x] Review HEP    [] Progressed/Changed HEP, detail:    [] Other detail:       Objective Information/Functional Measures/Assessment     Pretest/Post  Adductor Drop Test: right: neg/ neg    left: midline/negative  Lower trunk rotation (inches): Right: 14.5/14 left: 16/15.5  HRIG: Right: 85/90 Left: 75/90    Patient tolerated treatment session well today. Patient had no complaints with exercise program.  Verbal cues to decrease UT compensation with deep breathing and to elongate exhalation. Cues for full ROM with leg press, patient only performing partial movement with knee flexion. Continued with use of cushion, tactile cuing,  and had to use heel slide techniques to engage hamstrings. Patient continues to make steady progress toward goals and would benefit from continued skilled PT intervention to address remaining deficits outlined in goals below. Patient will continue to benefit from skilled PT / OT services to modify and progress therapeutic interventions, analyze and address functional mobility deficits, analyze and address ROM deficits, analyze and address strength deficits, analyze and address soft tissue restrictions, analyze and cue for proper movement patterns, and analyze and modify for postural abnormalities to address functional deficits and attain remaining goals. Progress toward goals / Updated goals:  []  See Progress Note/Recertification    To be accomplished in 4 weeks: 1. Patient will report compliance with HEP 1x/day to aid in rehabilitation program.  Status at IE: Provided pt with HEP and pt appeared to understand. Last PN 9/15/23: compliant at least 1 times, sometimes 2x/day, progressing   Current: 9/19/23 Reports that she is compliant with HEP.       2.Patient

## 2023-09-26 ENCOUNTER — HOSPITAL ENCOUNTER (OUTPATIENT)
Facility: HOSPITAL | Age: 78
Setting detail: RECURRING SERIES
Discharge: HOME | End: 2023-09-29
Payer: MEDICARE

## 2023-09-26 NOTE — PROGRESS NOTES
PHYSICAL / OCCUPATIONAL THERAPY - DAILY TREATMENT NOTE (updated )    Patient Name: Marilou Kang    Date: 2023    : 1945  Insurance: Payor: MEDICARE / Plan: MEDICARE PART A AND B / Product Type: *No Product type* /      Patient  verified Yes     Visit #   Current / Total 12 24   Time   In / Out *** ***   Pain   In / Out *** ***   Subjective Functional Status/Changes: ***   Changes to: Allergies, Med Hx, Sx Hx?   {YES/NO DIET:615078327}       TREATMENT AREA =  Pain in left knee [M25.562]    OBJECTIVE      Therapeutic Procedures: Tx Min Billable or 1:1 Min (if diff from Tx Min) Procedure, Rationale, Specifics   ***  44911 Therapeutic Exercise (timed):  increase ROM, strength, coordination, balance, and proprioception to improve patient's ability to progress to PLOF and address remaining functional goals. (see flow sheet as applicable)    Details if applicable:       ***  74125 Neuromuscular Re-Education (timed):  improve balance, coordination, kinesthetic sense, posture, core stability and proprioception to improve patient's ability to develop conscious control of individual muscles and awareness of position of extremities in order to progress to PLOF and address remaining functional goals. (see flow sheet as applicable)    Details if applicable:     ***  61366 Therapeutic Activity (timed):  use of dynamic activities replicating functional movements to increase ROM, strength, coordination, balance, and proprioception in order to improve patient's ability to progress to PLOF and address remaining functional goals.   (see flow sheet as applicable)     Details if applicable:       {In[employer] Ther Procedures (Optional):65771}    Details if applicable:       {In[employer] Ther Procedures (Optional):05542}     Details if applicable:     ***  [facility] Totals Reminder: bill using total billable min of TIMED therapeutic procedures (example: do not include dry needle or estim unattended, both untimed codes, in totals

## 2023-09-28 ENCOUNTER — HOSPITAL ENCOUNTER (OUTPATIENT)
Facility: HOSPITAL | Age: 78
Setting detail: RECURRING SERIES
End: 2023-09-28
Payer: MEDICARE

## 2023-09-28 PROCEDURE — 97112 NEUROMUSCULAR REEDUCATION: CPT

## 2023-09-28 PROCEDURE — 97110 THERAPEUTIC EXERCISES: CPT

## 2023-09-28 PROCEDURE — 97530 THERAPEUTIC ACTIVITIES: CPT

## 2023-09-28 NOTE — PROGRESS NOTES
PHYSICAL / OCCUPATIONAL THERAPY - DAILY TREATMENT NOTE (updated )    Patient Name: Conchis Smith    Date: 2023    : 1945  Insurance: Payor: MEDICARE / Plan: MEDICARE PART A AND B / Product Type: *No Product type* /      Patient  verified Yes     Visit #   Current / Total 12 24   Time   In / Out 1040 1120   Pain   In / Out 0 0   Subjective Functional Status/Changes: Patient reports she feels that she is doing well but doesn't know if she should DC    Changes to: Allergies, Med Hx, Sx Hx?   no       TREATMENT AREA =  Pain in left knee [M25.562]    OBJECTIVE    Therapeutic Procedures: Tx Min Billable or 1:1 Min (if diff from Tx Min) Procedure, Rationale, Specifics   10  39392 Therapeutic Exercise (timed):  increase ROM, strength, coordination, balance, and proprioception to improve patient's ability to progress to PLOF and address remaining functional goals. (see flow sheet as applicable)    Details if applicable:       10  58211 Neuromuscular Re-Education (timed):  improve balance, coordination, kinesthetic sense, posture, core stability and proprioception to improve patient's ability to develop conscious control of individual muscles and awareness of position of extremities in order to progress to PLOF and address remaining functional goals. (see flow sheet as applicable)    Details if applicable:     20  98246 Therapeutic Activity (timed):  use of dynamic activities replicating functional movements to increase ROM, strength, coordination, balance, and proprioception in order to improve patient's ability to progress to PLOF and address remaining functional goals.   (see flow sheet as applicable)     Details if applicable:           Details if applicable:            Details if applicable:     36  [facility] Totals Reminder: bill using total billable min of TIMED therapeutic procedures (example: do not include dry needle or estim unattended, both untimed codes, in totals to left)  8-22 min = 1 unit; 23-37

## 2025-09-02 ENCOUNTER — TRANSCRIBE ORDERS (OUTPATIENT)
Facility: HOSPITAL | Age: 80
End: 2025-09-02

## 2025-09-02 DIAGNOSIS — K21.00 GASTROESOPHAGEAL REFLUX DISEASE WITH ESOPHAGITIS WITHOUT HEMORRHAGE: Primary | ICD-10-CM

## 2025-09-02 DIAGNOSIS — R13.10 DYSPHAGIA, UNSPECIFIED TYPE: ICD-10-CM

## 2025-09-04 ENCOUNTER — HOSPITAL ENCOUNTER (OUTPATIENT)
Facility: HOSPITAL | Age: 80
Discharge: HOME | End: 2025-09-04
Attending: OTOLARYNGOLOGY
Payer: MEDICARE

## 2025-09-04 DIAGNOSIS — K21.00 GASTROESOPHAGEAL REFLUX DISEASE WITH ESOPHAGITIS WITHOUT HEMORRHAGE: ICD-10-CM

## 2025-09-04 DIAGNOSIS — R13.10 DYSPHAGIA, UNSPECIFIED TYPE: ICD-10-CM

## 2025-09-04 PROCEDURE — 2500000003 HC RX 250 WO HCPCS

## 2025-09-04 PROCEDURE — 74220 X-RAY XM ESOPHAGUS 1CNTRST: CPT

## 2025-09-04 PROCEDURE — 6370000000 HC RX 637 (ALT 250 FOR IP)

## 2025-09-04 RX ADMIN — BARIUM SULFATE 334 G: 980 POWDER, FOR SUSPENSION ORAL at 10:10

## 2025-09-04 RX ADMIN — ANTACID/ANTIFLATULENT 1 EACH: 380; 550; 10; 10 GRANULE, EFFERVESCENT ORAL at 10:10

## 2025-09-04 RX ADMIN — BARIUM SULFATE 176 G: 960 POWDER, FOR SUSPENSION ORAL at 10:11

## 2025-09-04 RX ADMIN — BARIUM SULFATE 1 TABLET: 700 TABLET ORAL at 10:10

## (undated) DEVICE — BLADE SAW W13XL90MM 1.19MM PARA

## (undated) DEVICE — PREP SKN CHLRAPRP APL 26ML STR --

## (undated) DEVICE — CLOSURE SKIN FLX NONINVASIVE PRELOC TECHNOLOGY FOR 24IN

## (undated) DEVICE — HANDPIECE SET WITH HIGH FLOW TIP AND SUCTION TUBE: Brand: INTERPULSE

## (undated) DEVICE — SUTURE PDS + SZ 1 L96IN ABSRB VLT L65MM TP-1 1/2 CIR PDP880G

## (undated) DEVICE — STERILE POLYISOPRENE POWDER-FREE SURGICAL GLOVES: Brand: PROTEXIS

## (undated) DEVICE — SOL INJ SOD CL 0.9% 100ML BG --

## (undated) DEVICE — CONCISE CEMENT SCULPS KIT: Brand: CONCISE

## (undated) DEVICE — NDL SPNE LR LCK 18GX6IN PNK --

## (undated) DEVICE — Device

## (undated) DEVICE — ZIMMER® STERILE DISPOSABLE TOURNIQUET CUFF WITH PROTECTIVE SLEEVE AND PLC, SINGLE PORT, SINGLE BLADDER, 34 IN. (86 CM)

## (undated) DEVICE — BLADE SAW 1.19X20X90 MM FOR LG BNE

## (undated) DEVICE — THE CANADY HYBRID PLASMA SCALPEL IS AN ELECTROSURGICAL PLASMA SCALPEL THAT USES AN 85MM BENDABLE PADDLE BLADE TIP. THE ELECTROSURGICAL PLASMA SCALPEL IS USED TO SIMULTANEOUSLY CUT AND COAGULATE BIOLOGICAL TISSUE.: Brand: CANADY HYBRID PLASMA PADDLE BLADE

## (undated) DEVICE — PACK PROCEDURE SURG TOT KNEE CUST

## (undated) DEVICE — SUT VCRL + 2-0 36IN CT1 UD --

## (undated) DEVICE — PIN GUIDE FIX 3.2X62 MM SCREW [GS9030620324P] [KOMET MEDICAL]

## (undated) DEVICE — SWAB CULT SGL AMIES W/O CHAR FOR THRT VAG SKIN HRT CULTSWAB

## (undated) DEVICE — NEEDLE HYPO 18GA L1.5IN PNK POLYPR HUB S STL THN WALL FILL

## (undated) DEVICE — NEEDLE SPNL 20GA LNG YEL HUB DISP

## (undated) DEVICE — SOLUTION SCRB 4OZ 10% PVP I POVIDONE IOD TOP PAINT EXIDINE

## (undated) DEVICE — STERILE POLYISOPRENE POWDER-FREE SURGICAL GLOVES WITH EMOLLIENT COATING: Brand: PROTEXIS

## (undated) DEVICE — SOL IRRIGATION INJ NACL 0.9% 500ML BTL

## (undated) DEVICE — 3 BONE CEMENT MIXER: Brand: MIXEVAC

## (undated) DEVICE — BIPOLAR SEALER 23-301-1 AQM MBS: Brand: AQUAMANTYS™

## (undated) DEVICE — SUT VCRL + 1 36IN CT1 VIO --

## (undated) DEVICE — HANDPIECE SET WITH FAN SPRAY TIP: Brand: INTERPULSE

## (undated) DEVICE — RECIPROCATING BLADE, DOUBLE SIDED, OFFSET  (70.0 X 0.64 X 12.6MM)

## (undated) DEVICE — PIN GUIDE FIX 3.2X62 MM SCREW [GS903A0620322P] [KOMET MEDICAL]

## (undated) DEVICE — DRSG MEPILES BORDER AG 4X12 -- 5/BX

## (undated) DEVICE — SOLUTION IRRIG 3000ML LAC R FLX CONT

## (undated) DEVICE — SUTURE STRATAFIX SYMMETRIC PDS + 1 SGL ARMED CT 18 IN LEN SXPP1A405

## (undated) DEVICE — SUTURE VCRL 2-0 L27IN ABSRB UD OS-6 L36MM 1/2 CIR REV CUT J533H

## (undated) DEVICE — SWAB CULT LIQ STUART AGR AERB MOD IN BRK SGL RAYON TIP PLAS 220099] BECTON DICKINSON MICRO]

## (undated) DEVICE — TABLE COVER: Brand: CONVERTORS

## (undated) DEVICE — 5.0MM ROUND FLUTED SOFT TOUCH

## (undated) DEVICE — OPTIFOAM GENTLE SA, POSTOP, 4X12: Brand: MEDLINE

## (undated) DEVICE — GARMENT COMPR M FOR 13IN FT INTMIT SGL BLDR HEM FORC II

## (undated) DEVICE — ZIP 16 SURGICAL SKIN CLOSURE DEVICE: Brand: ZIP 16 SURGICAL SKIN CLOSURE DEVICE